# Patient Record
Sex: MALE | Race: WHITE | NOT HISPANIC OR LATINO | Employment: OTHER | URBAN - METROPOLITAN AREA
[De-identification: names, ages, dates, MRNs, and addresses within clinical notes are randomized per-mention and may not be internally consistent; named-entity substitution may affect disease eponyms.]

---

## 2020-10-25 ENCOUNTER — APPOINTMENT (OUTPATIENT)
Dept: PREADMISSION TESTING | Facility: HOSPITAL | Age: 80
End: 2020-10-25

## 2020-10-25 LAB — SARS-COV-2 RNA RESP QL NAA+PROBE: NOT DETECTED

## 2020-10-25 PROCEDURE — C9803 HOPD COVID-19 SPEC COLLECT: HCPCS

## 2020-10-25 PROCEDURE — U0004 COV-19 TEST NON-CDC HGH THRU: HCPCS

## 2020-12-20 ENCOUNTER — APPOINTMENT (OUTPATIENT)
Dept: PREADMISSION TESTING | Facility: HOSPITAL | Age: 80
End: 2020-12-20

## 2020-12-20 LAB — SARS-COV-2 RNA RESP QL NAA+PROBE: NOT DETECTED

## 2020-12-20 PROCEDURE — C9803 HOPD COVID-19 SPEC COLLECT: HCPCS

## 2020-12-20 PROCEDURE — U0004 COV-19 TEST NON-CDC HGH THRU: HCPCS

## 2022-02-11 ENCOUNTER — TRANSCRIBE ORDERS (OUTPATIENT)
Dept: CARDIOLOGY | Facility: CLINIC | Age: 82
End: 2022-02-11

## 2022-02-11 DIAGNOSIS — I63.22 OCCLUSION AND STENOSIS OF BASILAR ARTERY WITH CEREBRAL INFARCTION: Primary | ICD-10-CM

## 2022-04-18 ENCOUNTER — TELEPHONE (OUTPATIENT)
Dept: FAMILY MEDICINE CLINIC | Facility: CLINIC | Age: 82
End: 2022-04-18

## 2022-04-18 RX ORDER — DONEPEZIL HYDROCHLORIDE 10 MG/1
10 TABLET, FILM COATED ORAL NIGHTLY
Qty: 90 TABLET | Refills: 1 | Status: SHIPPED | OUTPATIENT
Start: 2022-04-18 | End: 2022-04-18

## 2022-04-18 RX ORDER — DONEPEZIL HYDROCHLORIDE 10 MG/1
TABLET, FILM COATED ORAL
COMMUNITY
Start: 2022-01-10 | End: 2022-04-18 | Stop reason: SDUPTHER

## 2022-04-18 RX ORDER — DONEPEZIL HYDROCHLORIDE 10 MG/1
TABLET, FILM COATED ORAL
Status: CANCELLED | OUTPATIENT
Start: 2022-04-18

## 2022-04-18 RX ORDER — DONEPEZIL HYDROCHLORIDE 10 MG/1
TABLET, FILM COATED ORAL
Qty: 90 TABLET | Refills: 3 | Status: SHIPPED | OUTPATIENT
Start: 2022-04-18 | End: 2022-08-23 | Stop reason: SDUPTHER

## 2022-04-18 NOTE — TELEPHONE ENCOUNTER
Incoming Refill Request      Medication requested (name and dose): DONETEZIL 10 MG    Pharmacy where request should be sent: [facility]    Additional details provided by patient: PT IS OUT OF THIS MEDICATION    Best call back number: [phone]    Does the patient have less than a 3 day supply:  [x] Yes  [] No    René Pearce Rep  04/18/22, 11:57 EDT

## 2022-05-27 RX ORDER — CALCIUM CITRATE/VITAMIN D3 200MG-6.25
TABLET ORAL
Qty: 100 EACH | Refills: 1 | Status: SHIPPED | OUTPATIENT
Start: 2022-05-27 | End: 2022-12-12

## 2022-07-11 ENCOUNTER — TELEPHONE (OUTPATIENT)
Dept: FAMILY MEDICINE CLINIC | Facility: CLINIC | Age: 82
End: 2022-07-11

## 2022-07-11 NOTE — TELEPHONE ENCOUNTER
Please let patient know that I got results of a CTA on him.  It showed that his right carotid artery has a 70% blockage.  I see that he has an appointment with his cardiologist in 3 days, so he needs to be sure to keep that appointment.  He will need referral to a vascular surgeon or a neurosurgeon, and I am sure the cardiologist will discuss this with him.  Usually when the degree of blockage is 70% or more, surgery is recommended to help prevent stroke.

## 2022-07-14 ENCOUNTER — OFFICE VISIT (OUTPATIENT)
Dept: CARDIOLOGY | Facility: CLINIC | Age: 82
End: 2022-07-14

## 2022-07-14 ENCOUNTER — TELEPHONE (OUTPATIENT)
Dept: CARDIOLOGY | Facility: CLINIC | Age: 82
End: 2022-07-14

## 2022-07-14 VITALS
TEMPERATURE: 97 F | RESPIRATION RATE: 14 BRPM | DIASTOLIC BLOOD PRESSURE: 64 MMHG | SYSTOLIC BLOOD PRESSURE: 110 MMHG | BODY MASS INDEX: 27.35 KG/M2 | OXYGEN SATURATION: 97 % | HEIGHT: 70 IN | HEART RATE: 68 BPM | WEIGHT: 191 LBS

## 2022-07-14 DIAGNOSIS — Z01.810 ENCOUNTER FOR PREPROCEDURAL CARDIOVASCULAR EXAMINATION: ICD-10-CM

## 2022-07-14 DIAGNOSIS — E78.5 HYPERLIPIDEMIA LDL GOAL <70: ICD-10-CM

## 2022-07-14 DIAGNOSIS — I65.21 CAROTID STENOSIS, ASYMPTOMATIC, RIGHT: Primary | ICD-10-CM

## 2022-07-14 DIAGNOSIS — E11.9 TYPE 2 DIABETES MELLITUS WITHOUT COMPLICATION, WITHOUT LONG-TERM CURRENT USE OF INSULIN: ICD-10-CM

## 2022-07-14 DIAGNOSIS — Z01.818 PREOPERATIVE EVALUATION OF A MEDICAL CONDITION TO RULE OUT SURGICAL CONTRAINDICATIONS (TAR REQUIRED): ICD-10-CM

## 2022-07-14 DIAGNOSIS — I10 ESSENTIAL HYPERTENSION: ICD-10-CM

## 2022-07-14 PROCEDURE — 99204 OFFICE O/P NEW MOD 45 MIN: CPT | Performed by: INTERNAL MEDICINE

## 2022-07-14 PROCEDURE — 93000 ELECTROCARDIOGRAM COMPLETE: CPT | Performed by: INTERNAL MEDICINE

## 2022-07-14 RX ORDER — CLOPIDOGREL BISULFATE 75 MG/1
75 TABLET ORAL DAILY
COMMUNITY
Start: 2022-06-07 | End: 2022-08-23 | Stop reason: SDUPTHER

## 2022-07-14 RX ORDER — ATORVASTATIN CALCIUM 10 MG/1
10 TABLET, FILM COATED ORAL DAILY
COMMUNITY
Start: 2022-02-03 | End: 2022-08-23 | Stop reason: SDUPTHER

## 2022-07-14 RX ORDER — LISINOPRIL 5 MG/1
5 TABLET ORAL DAILY
COMMUNITY
Start: 2021-12-07 | End: 2022-08-23 | Stop reason: SDUPTHER

## 2022-07-14 RX ORDER — CITALOPRAM 40 MG/1
40 TABLET ORAL DAILY
COMMUNITY
End: 2022-08-23 | Stop reason: SDUPTHER

## 2022-07-14 RX ORDER — METFORMIN HYDROCHLORIDE 500 MG/1
500 TABLET, EXTENDED RELEASE ORAL DAILY
COMMUNITY
Start: 2021-12-20 | End: 2022-07-23 | Stop reason: SDUPTHER

## 2022-07-14 NOTE — PROGRESS NOTES
[facility]  [facility]IOLOG[facility]  [address] [place] [address]  Dept: [phone]  Dept Fax: [phone]    Orestes Garcia Jr.  1940    Follow Up Office Visit Note    History of Present Illness:  Orestes Garcia Jr. is a 82 y.o. male who presents to the clinic for carotid stenosis- The patient is a 82 years old, with Hypertension, hyperlipidemia, no smoker, seems he has mild cognitive issues, although his wife seems he is fine , went to [facility] for neck sharp pain and was found 70% OLVIN by CTA, denies any weakness, speech abnormalities, . He has had a carotid doppler in 2019 with 40% and seems he has been taking ASA and Plavix likely for that, no history of CAD, no complaints, , his cardiac exam is normal BP is 125.,80 he takes Lisinopril 5mg and also Lipitor 40 mg, , he might not be a good candidate for surgery,  Maybe stent, will get an echo and also a stress nuclear, , will sent him to see cardiovascular surgeons    The following portions of the patient's history were reviewed and updated as appropriate: allergies, current medications, past family history, past medical history, past social history, past surgical history and problem list.    Medications:  atorvastatin  citalopram  clopidogrel  donepezil  lisinopril  metFORMIN ER  True Metrix Blood Glucose Test strip    Subjective  No Known Allergies     History reviewed. No pertinent past medical history.    History reviewed. No pertinent surgical history.    History reviewed. No pertinent family history.     Social History     Socioeconomic History   • Marital status:    Tobacco Use   • Smoking status: Never Smoker   • Smokeless tobacco: Never Used   Vaping Use   • Vaping Use: Never used   Substance and Sexual Activity   • Alcohol use: Never   • Drug use: Never   • Sexual activity: Defer       Review of Systems   Constitutional: Negative.    HENT: Negative.    Respiratory: Negative.    Cardiovascular: Negative.   "  Endocrine: Negative.    Genitourinary: Negative.    Musculoskeletal: Negative.    Skin: Negative.    Allergic/Immunologic: Negative.    Neurological: Negative.    Hematological: Negative.    Psychiatric/Behavioral: Negative.        Cardiovascular Procedures    ECHO/MUGA:   STRESS TESTS:   CARDIAC CATH:   DEVICES:   HOLTER:   CT/MRI:   VASCULAR:   CARDIOTHORACIC:     Objective  Vitals:    07/14/22 1417   BP: 110/64   BP Location: Left arm   Patient Position: Lying   Cuff Size: Adult   Pulse: 68   Resp: 14   Temp: 97 °F (36.1 °C)   TempSrc: Infrared   SpO2: 97%   Weight: 86.6 kg (191 lb)   Height: 177.8 cm (70\")   PainSc: 0-No pain     Body mass index is 27.41 kg/m².     Physical Exam  Constitutional:       Appearance: Healthy appearance. Not in distress.   Neck:      Vascular: No JVR. JVD normal.   Pulmonary:      Effort: Pulmonary effort is normal.      Breath sounds: Normal breath sounds. No wheezing. No rhonchi. No rales.   Chest:      Chest wall: Not tender to palpatation.   Cardiovascular:      PMI at left midclavicular line. Normal rate. Regular rhythm. Normal S1. Normal S2.      Murmurs: There is no murmur.      No gallop. No click. No rub.   Pulses:     Intact distal pulses.   Edema:     Peripheral edema absent.   Abdominal:      General: Bowel sounds are normal.      Palpations: Abdomen is soft.      Tenderness: There is no abdominal tenderness.   Musculoskeletal: Normal range of motion.         General: No tenderness. Skin:     General: Skin is warm and dry.   Neurological:      General: No focal deficit present.      Mental Status: Alert and oriented to person, place and time.          Diagnostic Data    ECG 12 Lead    Date/Time: 7/14/2022 3:18 PM  Performed by: Greg Wilkerson MD  Authorized by: Greg Wilkerson MD   Comparison: compared with previous ECG   Rhythm: sinus rhythm  Rate: normal  BPM: 64  QRS axis: normal  Other findings: non-specific ST-T wave changes    Clinical impression: " abnormal EKG            Assessment and Plan  Diagnoses and all orders for this visit:    Carotid stenosis, asymptomatic, right- no sure what to think about those complaints, 70%, on ASA, Plavix, . Will sent her to see ariadne Hargrove stress nuclear and also an echo in anticipation of the possible surgery or stent    Essential hypertension- The BP is 125.80  On Lisinopril 5mg    Hyperlipidemia LDL goal <70- On Lipitor 40 mg    Type 2 diabetes mellitus without complication, without long-term current use of insulin ([facility])    Other orders  -     citalopram (CeleXA) 40 MG tablet; Take 40 mg by mouth Daily.  -     atorvastatin (LIPITOR) 10 MG tablet; Take 10 mg by mouth Daily.  -     clopidogrel (PLAVIX) 75 MG tablet; Take 75 mg by mouth Daily.  -     lisinopril (PRINIVIL,ZESTRIL) 5 MG tablet; Take 5 mg by mouth Daily.  -     metFORMIN ER (GLUCOPHAGE-XR) 500 MG 24 hr tablet; Take 500 mg by mouth Daily.         No follow-ups on file.    Greg Wilkerson MD  07/14/2022

## 2022-07-14 NOTE — TELEPHONE ENCOUNTER
Left message for pt wife to call back . Before he see surgeron pt  will get an echo and also a stress nuclear, , will sent him to see cardiovascular surgeons.

## 2022-07-21 ENCOUNTER — TELEPHONE (OUTPATIENT)
Dept: CARDIOLOGY | Facility: CLINIC | Age: 82
End: 2022-07-21

## 2022-07-21 NOTE — TELEPHONE ENCOUNTER
----- Message from Greg Wilkerson MD sent at 7/20/2022  6:02 PM EDT -----  For some reason he does not have follow up, next week nothing urgent

## 2022-07-22 ENCOUNTER — PATIENT ROUNDING (BHMG ONLY) (OUTPATIENT)
Dept: CARDIOLOGY | Facility: CLINIC | Age: 82
End: 2022-07-22

## 2022-07-22 NOTE — PROGRESS NOTES
July 22, 2022    Hello, may I speak with Orestes Garcia Jr.?    My name is RIDGE      I am  with [facility]  [facility] CARDIOLOGY  [address] [place] [address].    Before we get started may I verify your date of birth? 1940    I am calling to officially welcome you to our practice and ask about your recent visit. Is this a good time to talk? yes    Tell me about your visit with us. What things went well?  VERY PLEASED WITH EVERYTHING       We're always looking for ways to make our patients' experiences even better. Do you have recommendations on ways we may improve?  no    Overall were you satisfied with your first visit to our practice? yes       I appreciate you taking the time to speak with me today. Is there anything else I can do for you? no      Thank you, and have a great day.

## 2022-07-23 ENCOUNTER — TELEPHONE (OUTPATIENT)
Dept: FAMILY MEDICINE CLINIC | Facility: CLINIC | Age: 82
End: 2022-07-23

## 2022-07-23 RX ORDER — METFORMIN HYDROCHLORIDE 500 MG/1
500 TABLET, EXTENDED RELEASE ORAL DAILY
Qty: 30 TABLET | Refills: 0 | Status: SHIPPED | OUTPATIENT
Start: 2022-07-23 | End: 2022-08-23 | Stop reason: SDUPTHER

## 2022-07-23 NOTE — TELEPHONE ENCOUNTER
Incoming Refill Request      Medication requested (name and dose):  METFORMIN 500 MG    Ph[facility] where request should be sent: [facility]    Additional details provided by patient: PT STATES HE HAS A COUPLE DAYS LEFT AND NEEDS REFILL SENT TO [facility]. HAS APPT SCHEDULE FOR 8/23/22    Best call back number: [phone]    Does the patient have less than a 3 day supply:  [x] Yes  [] No    René GREGORY Rep  07/23/22, 09:20 EDT

## 2022-07-27 ENCOUNTER — OFFICE VISIT (OUTPATIENT)
Dept: CARDIOLOGY | Facility: CLINIC | Age: 82
End: 2022-07-27

## 2022-07-27 VITALS
DIASTOLIC BLOOD PRESSURE: 58 MMHG | HEART RATE: 74 BPM | HEIGHT: 70 IN | OXYGEN SATURATION: 96 % | TEMPERATURE: 96.2 F | RESPIRATION RATE: 20 BRPM | SYSTOLIC BLOOD PRESSURE: 118 MMHG | BODY MASS INDEX: 27.66 KG/M2 | WEIGHT: 193.2 LBS

## 2022-07-27 DIAGNOSIS — E11.9 TYPE 2 DIABETES MELLITUS WITHOUT COMPLICATION, WITHOUT LONG-TERM CURRENT USE OF INSULIN: ICD-10-CM

## 2022-07-27 DIAGNOSIS — I65.21 CAROTID STENOSIS, ASYMPTOMATIC, RIGHT: Primary | ICD-10-CM

## 2022-07-27 DIAGNOSIS — I10 ESSENTIAL HYPERTENSION: ICD-10-CM

## 2022-07-27 DIAGNOSIS — E78.5 HYPERLIPIDEMIA LDL GOAL <70: ICD-10-CM

## 2022-07-27 PROCEDURE — 99214 OFFICE O/P EST MOD 30 MIN: CPT | Performed by: INTERNAL MEDICINE

## 2022-07-27 NOTE — PROGRESS NOTES
[facility]  [facility] CARDIOLOGY  [address] [place] [address]  Dept: [phone]  Dept Fax: [phone]    Orestes Garcia Jr.  1940    Follow Up Office Visit Note    History of Present Illness:  Orestes Garcia Jr. is a 82 y.o. male who presents to the clinic for Follow-up. Carotid stenosis - He has 70% OLVIN, stenosis he also has some vertebro basilar stenosis- Will refer him for the chances of intervention stent to Dr Stan Carrington , stress nuclear is normal, as well echo    The following portions of the patient's history were reviewed and updated as appropriate: allergies, current medications, past family history, past medical history, past social history, past surgical history and problem list.    Medications:  atorvastatin  citalopram  clopidogrel  donepezil  lisinopril  metFORMIN ER  regadenoson  True Metrix Blood Glucose Test strip    Subjective  No Known Allergies     History reviewed. No pertinent past medical history.    History reviewed. No pertinent surgical history.    History reviewed. No pertinent family history.     Social History     Socioeconomic History   • Marital status:    Tobacco Use   • Smoking status: Never Smoker   • Smokeless tobacco: Never Used   Vaping Use   • Vaping Use: Never used   Substance and Sexual Activity   • Alcohol use: Never   • Drug use: Never   • Sexual activity: Defer       Review of Systems   Constitutional: Negative.    HENT: Negative.    Respiratory: Negative.    Cardiovascular: Negative.    Endocrine: Negative.    Genitourinary: Negative.    Musculoskeletal: Negative.    Skin: Negative.    Allergic/Immunologic: Negative.    Neurological: Negative.    Hematological: Negative.    Psychiatric/Behavioral: Negative.        Cardiovascular Procedures    ECHO/MUGA:   STRESS TESTS:   CARDIAC CATH:   DEVICES:   HOLTER:   CT/MRI:   VASCULAR:   CARDIOTHORACIC:     Objective  Vitals:    07/27/22 1321   BP: 118/58   BP Location: Left  "arm   Patient Position: Lying   Cuff Size: Adult   Pulse: 74   Resp: 20   Temp: 96.2 °F (35.7 °C)   TempSrc: Temporal   SpO2: 96%   Weight: 87.6 kg (193 lb 3.2 oz)   Height: 177.8 cm (70\")   PainSc: 0-No pain     Body mass index is 27.72 kg/m².     Physical Exam  Constitutional:       Appearance: Healthy appearance. Not in distress.   Neck:      Vascular: No JVR. JVD normal.   Pulmonary:      Effort: Pulmonary effort is normal.      Breath sounds: Normal breath sounds. No wheezing. No rhonchi. No rales.   Chest:      Chest wall: Not tender to palpatation.   Cardiovascular:      PMI at left midclavicular line. Normal rate. Regular rhythm. Normal S1. Normal S2.      Murmurs: There is no murmur.      No gallop. No click. No rub.   Pulses:     Intact distal pulses.   Edema:     Peripheral edema absent.   Abdominal:      General: Bowel sounds are normal.      Palpations: Abdomen is soft.      Tenderness: There is no abdominal tenderness.   Musculoskeletal: Normal range of motion.         General: No tenderness. Skin:     General: Skin is warm and dry.   Neurological:      General: No focal deficit present.      Mental Status: Alert and oriented to person, place and time.          Diagnostic Data  Procedures    Assessment and Plan  Diagnoses and all orders for this visit:    Carotid stenosis, asymptomatic, right- Has 70% stenosis, will sent him for possible stent to Dr Charissa Pierce    Essential hypertension- BP is fine 105/60 just on Lisinopril 5mg    Hyperlipidemia LDL goal <70- On Atorvastatin 10 mg    Type 2 diabetes mellitus without complication, without long-term current use of insulin (HCC)         Return in about 3 months (around 10/27/2022) for Recheck.    Greg Wilkerson MD  07/27/2022  "

## 2022-08-22 ENCOUNTER — PREP FOR SURGERY (OUTPATIENT)
Dept: OTHER | Facility: HOSPITAL | Age: 82
End: 2022-08-22

## 2022-08-22 ENCOUNTER — OFFICE VISIT (OUTPATIENT)
Dept: NEUROSURGERY | Facility: CLINIC | Age: 82
End: 2022-08-22

## 2022-08-22 VITALS
WEIGHT: 192.2 LBS | HEART RATE: 70 BPM | OXYGEN SATURATION: 99 % | DIASTOLIC BLOOD PRESSURE: 72 MMHG | BODY MASS INDEX: 27.52 KG/M2 | HEIGHT: 70 IN | SYSTOLIC BLOOD PRESSURE: 140 MMHG | TEMPERATURE: 96.6 F

## 2022-08-22 DIAGNOSIS — I65.21 CAROTID STENOSIS, ASYMPTOMATIC, RIGHT: Primary | ICD-10-CM

## 2022-08-22 PROCEDURE — 99204 OFFICE O/P NEW MOD 45 MIN: CPT | Performed by: RADIOLOGY

## 2022-08-22 RX ORDER — SODIUM CHLORIDE 0.9 % (FLUSH) 0.9 %
10 SYRINGE (ML) INJECTION EVERY 12 HOURS SCHEDULED
Status: CANCELLED | OUTPATIENT
Start: 2022-08-22

## 2022-08-22 RX ORDER — SODIUM CHLORIDE 0.9 % (FLUSH) 0.9 %
1-10 SYRINGE (ML) INJECTION AS NEEDED
Status: CANCELLED | OUTPATIENT
Start: 2022-08-22

## 2022-08-22 RX ORDER — SODIUM CHLORIDE 9 MG/ML
100 INJECTION, SOLUTION INTRAVENOUS CONTINUOUS
Status: CANCELLED | OUTPATIENT
Start: 2022-08-22

## 2022-08-23 ENCOUNTER — OFFICE VISIT (OUTPATIENT)
Dept: FAMILY MEDICINE CLINIC | Facility: CLINIC | Age: 82
End: 2022-08-23

## 2022-08-23 VITALS
HEIGHT: 70 IN | SYSTOLIC BLOOD PRESSURE: 120 MMHG | OXYGEN SATURATION: 96 % | BODY MASS INDEX: 27.59 KG/M2 | DIASTOLIC BLOOD PRESSURE: 78 MMHG | TEMPERATURE: 97.8 F | RESPIRATION RATE: 18 BRPM | HEART RATE: 53 BPM | WEIGHT: 192.7 LBS

## 2022-08-23 DIAGNOSIS — Z79.899 HIGH RISK MEDICATION USE: ICD-10-CM

## 2022-08-23 DIAGNOSIS — R53.83 FATIGUE, UNSPECIFIED TYPE: ICD-10-CM

## 2022-08-23 DIAGNOSIS — K58.0 IRRITABLE BOWEL SYNDROME WITH DIARRHEA: ICD-10-CM

## 2022-08-23 DIAGNOSIS — I10 BENIGN ESSENTIAL HYPERTENSION: ICD-10-CM

## 2022-08-23 DIAGNOSIS — I65.21 CAROTID STENOSIS, ASYMPTOMATIC, RIGHT: ICD-10-CM

## 2022-08-23 DIAGNOSIS — E78.5 HYPERLIPIDEMIA LDL GOAL <70: ICD-10-CM

## 2022-08-23 DIAGNOSIS — K57.30 DIVERTICULAR DISEASE OF COLON: ICD-10-CM

## 2022-08-23 DIAGNOSIS — I65.21 STENOSIS OF RIGHT CAROTID ARTERY: ICD-10-CM

## 2022-08-23 DIAGNOSIS — F03.90 DEMENTIA WITHOUT BEHAVIORAL DISTURBANCE, UNSPECIFIED DEMENTIA TYPE: ICD-10-CM

## 2022-08-23 DIAGNOSIS — N18.31 TYPE 2 DIABETES MELLITUS WITH STAGE 3A CHRONIC KIDNEY DISEASE, WITHOUT LONG-TERM CURRENT USE OF INSULIN: Primary | ICD-10-CM

## 2022-08-23 DIAGNOSIS — E11.22 TYPE 2 DIABETES MELLITUS WITH STAGE 3A CHRONIC KIDNEY DISEASE, WITHOUT LONG-TERM CURRENT USE OF INSULIN: Primary | ICD-10-CM

## 2022-08-23 DIAGNOSIS — F32.4 MAJOR DEPRESSIVE DISORDER WITH SINGLE EPISODE, IN PARTIAL REMISSION: ICD-10-CM

## 2022-08-23 DIAGNOSIS — E78.2 MIXED HYPERLIPIDEMIA: ICD-10-CM

## 2022-08-23 PROBLEM — E11.9 TYPE 2 DIABETES MELLITUS: Status: ACTIVE | Noted: 2018-04-18

## 2022-08-23 PROCEDURE — 99214 OFFICE O/P EST MOD 30 MIN: CPT | Performed by: FAMILY MEDICINE

## 2022-08-23 PROCEDURE — 36415 COLL VENOUS BLD VENIPUNCTURE: CPT | Performed by: FAMILY MEDICINE

## 2022-08-23 RX ORDER — DONEPEZIL HYDROCHLORIDE 10 MG/1
10 TABLET, FILM COATED ORAL EVERY EVENING
Qty: 90 TABLET | Refills: 3 | Status: SHIPPED | OUTPATIENT
Start: 2022-08-23

## 2022-08-23 RX ORDER — LISINOPRIL 5 MG/1
5 TABLET ORAL DAILY
Qty: 90 TABLET | Refills: 3 | Status: SHIPPED | OUTPATIENT
Start: 2022-08-23

## 2022-08-23 RX ORDER — ATORVASTATIN CALCIUM 10 MG/1
10 TABLET, FILM COATED ORAL DAILY
Qty: 90 TABLET | Refills: 3 | Status: SHIPPED | OUTPATIENT
Start: 2022-08-23 | End: 2022-10-26 | Stop reason: SDUPTHER

## 2022-08-23 RX ORDER — CLOPIDOGREL BISULFATE 75 MG/1
75 TABLET ORAL DAILY
Qty: 90 TABLET | Refills: 3 | Status: SHIPPED | OUTPATIENT
Start: 2022-08-23

## 2022-08-23 RX ORDER — METFORMIN HYDROCHLORIDE 500 MG/1
500 TABLET, EXTENDED RELEASE ORAL DAILY
Qty: 90 TABLET | Refills: 3 | Status: SHIPPED | OUTPATIENT
Start: 2022-08-23 | End: 2023-08-04

## 2022-08-23 RX ORDER — CITALOPRAM 40 MG/1
40 TABLET ORAL DAILY
Qty: 90 TABLET | Refills: 3 | Status: SHIPPED | OUTPATIENT
Start: 2022-08-23

## 2022-08-24 LAB
ALBUMIN SERPL-MCNC: 4.6 G/DL (ref 3.6–4.6)
ALBUMIN/GLOB SERPL: 1.5 {RATIO} (ref 1.2–2.2)
ALP SERPL-CCNC: 84 IU/L (ref 44–121)
ALT SERPL-CCNC: 26 IU/L (ref 0–44)
AST SERPL-CCNC: 24 IU/L (ref 0–40)
BASOPHILS # BLD AUTO: 0.1 X10E3/UL (ref 0–0.2)
BASOPHILS NFR BLD AUTO: 1 %
BILIRUB SERPL-MCNC: 0.5 MG/DL (ref 0–1.2)
BUN SERPL-MCNC: 28 MG/DL (ref 8–27)
BUN/CREAT SERPL: 19 (ref 10–24)
CALCIUM SERPL-MCNC: 9.7 MG/DL (ref 8.6–10.2)
CHLORIDE SERPL-SCNC: 105 MMOL/L (ref 96–106)
CHOLEST SERPL-MCNC: 150 MG/DL (ref 100–199)
CO2 SERPL-SCNC: 23 MMOL/L (ref 20–29)
CREAT SERPL-MCNC: 1.46 MG/DL (ref 0.76–1.27)
EGFRCR-CYS SERPLBLD CKD-EPI 2021: 47 ML/MIN/1.73
EOSINOPHIL # BLD AUTO: 0.2 X10E3/UL (ref 0–0.4)
EOSINOPHIL NFR BLD AUTO: 3 %
ERYTHROCYTE [DISTWIDTH] IN BLOOD BY AUTOMATED COUNT: 13 % (ref 11.6–15.4)
GLOBULIN SER CALC-MCNC: 3 G/DL (ref 1.5–4.5)
GLUCOSE SERPL-MCNC: 130 MG/DL (ref 65–99)
HBA1C MFR BLD: 7 % (ref 4.8–5.6)
HCT VFR BLD AUTO: 45.4 % (ref 37.5–51)
HDLC SERPL-MCNC: 48 MG/DL
HGB BLD-MCNC: 15 G/DL (ref 13–17.7)
IMM GRANULOCYTES # BLD AUTO: 0 X10E3/UL (ref 0–0.1)
IMM GRANULOCYTES NFR BLD AUTO: 0 %
LDLC SERPL CALC-MCNC: 87 MG/DL (ref 0–99)
LYMPHOCYTES # BLD AUTO: 1.1 X10E3/UL (ref 0.7–3.1)
LYMPHOCYTES NFR BLD AUTO: 18 %
MCH RBC QN AUTO: 29.7 PG (ref 26.6–33)
MCHC RBC AUTO-ENTMCNC: 33 G/DL (ref 31.5–35.7)
MCV RBC AUTO: 90 FL (ref 79–97)
MONOCYTES # BLD AUTO: 0.7 X10E3/UL (ref 0.1–0.9)
MONOCYTES NFR BLD AUTO: 10 %
NEUTROPHILS # BLD AUTO: 4.3 X10E3/UL (ref 1.4–7)
NEUTROPHILS NFR BLD AUTO: 68 %
PLATELET # BLD AUTO: 187 X10E3/UL (ref 150–450)
POTASSIUM SERPL-SCNC: 4.4 MMOL/L (ref 3.5–5.2)
PROT SERPL-MCNC: 7.6 G/DL (ref 6–8.5)
RBC # BLD AUTO: 5.05 X10E6/UL (ref 4.14–5.8)
SODIUM SERPL-SCNC: 143 MMOL/L (ref 134–144)
TRIGL SERPL-MCNC: 80 MG/DL (ref 0–149)
TSH SERPL DL<=0.005 MIU/L-ACNC: 3.88 UIU/ML (ref 0.45–4.5)
VLDLC SERPL CALC-MCNC: 15 MG/DL (ref 5–40)
WBC # BLD AUTO: 6.4 X10E3/UL (ref 3.4–10.8)

## 2022-08-25 NOTE — PROGRESS NOTES
"Chief Complaint  Med Refill    Subjective      Orestes Garcia . presents to [facility]  History of Present Illness  Patient is here for 6-month checkup and medicine refills.  He really does not want to be here today and thinks it is a waste of his time because he has been seeing other doctors, but those of the doctors including cardiologist and a vascular surgeon or neurosurgeon because he was found to have a 70% right internal carotid artery obstruction recently.  He is going for an arteriogram soon.  I explained to the patient that since he has diabetes and a right of other health problems that the cardiologist and neurosurgeon will not address, he needs to be seen here every 6 months and have lab work, and more often if any problems develop.  He states his blood sugars been 125-140.  He has a history of dementia, but insisted on coming back to the room alone therefore I do not know if all of his history is reliable nor do I know if you will remember the instructions I gave him.  However, he denies any other issues or concerns today    Objective   Vital Signs:   Vitals:    08/23/22 0919   BP: 120/78   BP Location: Left arm   Patient Position: Sitting   Cuff Size: Adult   Pulse: 53   Resp: 18   Temp: 97.8 °F (36.6 °C)   TempSrc: Temporal   SpO2: 96%   Weight: 87.4 kg (192 lb 11.2 oz)   Height: 177.8 cm (70\")      /78 (BP Location: Left arm, Patient Position: Sitting, Cuff Size: Adult)   Pulse 53   Temp 97.8 °F (36.6 °C) (Temporal)   Resp 18   Ht 177.8 cm (70\")   Wt 87.4 kg (192 lb 11.2 oz)   SpO2 96%   BMI 27.65 kg/m²     Body mass index is 27.65 kg/m².    Review of Systems   Constitutional: Negative for chills, fever and unexpected weight loss.   HENT: Negative for ear discharge, ear pain, mouth sores, nosebleeds, rhinorrhea, sinus pressure, sore throat, swollen glands and trouble swallowing.    Eyes: Negative for blurred vision, double vision, pain, redness and visual " disturbance.   Respiratory: Negative for cough, shortness of breath and wheezing.    Cardiovascular: Negative for chest pain, palpitations and leg swelling.        PND, orthopnea   Gastrointestinal: Negative for abdominal distention, abdominal pain, blood in stool, constipation, diarrhea, nausea, vomiting, GERD and indigestion.        Dysphagia, odynophagia   Endocrine: Negative for polydipsia, polyphagia and polyuria.   Genitourinary: Positive for nocturia. Negative for dysuria, hematuria and urinary incontinence.   Musculoskeletal: Negative for arthralgias (unusual/atypica), back pain, gait problem, joint swelling, myalgias and neck pain.   Skin: Negative for rash, skin lesions (worrisome/suspicious) and bruise.   Allergic/Immunologic: Negative for food allergies.   Neurological: Positive for memory problem. Negative for dizziness, tremors, seizures, syncope, weakness, light-headedness, numbness and headache.   Hematological: Negative for adenopathy. Does not bruise/bleed easily.   Psychiatric/Behavioral: Negative for suicidal ideas and depressed mood. The patient is not nervous/anxious.        Past History:  Medical History: has a past medical history of Anemia, Benign essential HTN, Carotid artery stenosis (07/2020), CKD (chronic kidney disease), stage III (HCC), Colon polyp (2012), Condition not found (2018), Dementia (HCC), Depression, Diverticular disease of colon, Diverticulosis, Hearing loss, High risk medication use, IBS (irritable bowel syndrome), Mixed hyperlipidemia, Overweight (BMI 25.0-29.9), Skin cancer, Transient cerebral ischemia (2019), and Type 2 diabetes mellitus (HCC).   Surgical History: has a past surgical history that includes Skin biopsy (Right, 2016); Colonoscopy (2012); Tonsillectomy (1950); and Cataract extraction (2020).   Family History: family history includes Alzheimer's disease in his father; Cancer in his mother; Coronary artery disease (age of onset: 86) in his father; Diabetes in  his father; Hearing loss in his father; Hypertension in his mother; Pancreatic cancer in his mother.   Social History: reports that he has never smoked. He has never used smokeless tobacco. He reports that he does not drink alcohol and does not use drugs.      Current Outpatient Medications:   •  atorvastatin (LIPITOR) 10 MG tablet, Take 1 tablet by mouth Daily., Disp: 90 tablet, Rfl: 3  •  Cholecalciferol (Vitamin D3) 25 MCG (1000 UT) capsule, Take 1,000 Units by mouth Daily., Disp: , Rfl:   •  citalopram (CeleXA) 40 MG tablet, Take 1 tablet by mouth Daily., Disp: 90 tablet, Rfl: 3  •  clopidogrel (PLAVIX) 75 MG tablet, Take 1 tablet by mouth Daily., Disp: 90 tablet, Rfl: 3  •  donepezil (ARICEPT) 10 MG tablet, Take 1 tablet by mouth Every Evening., Disp: 90 tablet, Rfl: 3  •  lisinopril (PRINIVIL,ZESTRIL) 5 MG tablet, Take 1 tablet by mouth Daily., Disp: 90 tablet, Rfl: 3  •  metFORMIN ER (GLUCOPHAGE-XR) 500 MG 24 hr tablet, Take 1 tablet by mouth Daily for 346 days., Disp: 90 tablet, Rfl: 3  •  True Metrix Blood Glucose Test test strip, USE ONE STRIP TWO TIMES A DAY OR AS NEEDED FOR DIABETES, Disp: 100 each, Rfl: 1    Current Facility-Administered Medications:   •  regadenoson (LEXISCAN) injection 0.4 mg, 0.4 mg, Intravenous, Once in imaging, Greg Wilkerson MD    Allergies: Patient has no known allergies.    Physical Exam  Constitutional:       General: He is not in acute distress.     Appearance: He is not toxic-appearing.   HENT:      Head: Normocephalic and atraumatic.      Right Ear: Ear canal and external ear normal.      Left Ear: Ear canal and external ear normal.      Nose: Nose normal.      Mouth/Throat:      Mouth: Mucous membranes are moist.      Pharynx: Oropharynx is clear.   Eyes:      General: No scleral icterus.     Extraocular Movements: Extraocular movements intact.      Conjunctiva/sclera: Conjunctivae normal.      Pupils: Pupils are equal, round, and reactive to light.   Neck:       Vascular: No carotid bruit.   Cardiovascular:      Rate and Rhythm: Normal rate and regular rhythm.      Pulses: Normal pulses.      Heart sounds: Normal heart sounds.   Pulmonary:      Effort: Pulmonary effort is normal.      Breath sounds: Normal breath sounds.   Chest:      Chest wall: No tenderness.   Abdominal:      General: Bowel sounds are normal. There is no distension.      Palpations: Abdomen is soft. There is no mass.      Tenderness: There is no abdominal tenderness. There is no guarding or rebound.   Musculoskeletal:         General: No swelling, tenderness or deformity. Normal range of motion.      Cervical back: Normal range of motion. No rigidity.      Right lower leg: No edema.      Left lower leg: No edema.   Lymphadenopathy:      Cervical: No cervical adenopathy.   Skin:     General: Skin is warm and dry.      Capillary Refill: Capillary refill takes less than 2 seconds.      Coloration: Skin is not pale.      Findings: No erythema or rash.   Neurological:      General: No focal deficit present.      Mental Status: He is alert and oriented to person, place, and time.      Cranial Nerves: No cranial nerve deficit.      Motor: No weakness.      Coordination: Coordination normal.      Gait: Gait normal.   Psychiatric:         Attention and Perception: Attention and perception normal.         Mood and Affect: Mood normal. Affect is angry.         Speech: Speech normal.         Behavior: Behavior normal.         Thought Content: Thought content normal. Thought content is not paranoid or delusional. Thought content does not include homicidal or suicidal ideation.         Cognition and Memory: Cognition normal. He exhibits impaired remote memory.         Judgment: Judgment normal.          Result Review :                  Assessment and Plan   Diagnoses and all orders for this visit:    1. Type 2 diabetes mellitus with stage 3a chronic kidney disease, without long-term current use of insulin (HCC)  (Primary)  -     Hemoglobin A1c; Future  -     Hemoglobin A1c  Patient initially refused to do blood work today but I was able to persuade him to go ahead and have the things done that we need to the day.  His diabetes appears to be under good control with current medications.  I will refill his medications and check labs and plan on seeing him back in 6 months or sooner if needed  2. Benign essential hypertension    3. Mixed hyperlipidemia  -     Lipid Panel; Future  -     Lipid Panel    4. Stenosis of right carotid artery    5. Hyperlipidemia LDL goal <70    6. Carotid stenosis, asymptomatic, right    7. Irritable bowel syndrome with diarrhea    8. High risk medication use  -     CBC Auto Differential; Future  -     Comprehensive Metabolic Panel; Future  -     CBC Auto Differential  -     Comprehensive Metabolic Panel    9. Diverticular disease of colon    10. Fatigue, unspecified type  -     TSH; Future  -     TSH    11. Dementia without behavioral disturbance, unspecified dementia type (HCC)  Patient is on Aricept 10 mg daily, and refused to let any family members come back with him today  12. Major depressive disorder with single episode, in partial remission (HCC)  Patient reports that his depression and anxiety symptoms are well controlled with current medication  Other orders  -     atorvastatin (LIPITOR) 10 MG tablet; Take 1 tablet by mouth Daily.  Dispense: 90 tablet; Refill: 3  -     citalopram (CeleXA) 40 MG tablet; Take 1 tablet by mouth Daily.  Dispense: 90 tablet; Refill: 3  -     donepezil (ARICEPT) 10 MG tablet; Take 1 tablet by mouth Every Evening.  Dispense: 90 tablet; Refill: 3  -     clopidogrel (PLAVIX) 75 MG tablet; Take 1 tablet by mouth Daily.  Dispense: 90 tablet; Refill: 3  -     lisinopril (PRINIVIL,ZESTRIL) 5 MG tablet; Take 1 tablet by mouth Daily.  Dispense: 90 tablet; Refill: 3  -     metFORMIN ER (GLUCOPHAGE-XR) 500 MG 24 hr tablet; Take 1 tablet by mouth Daily for 346 days.   Dispense: 90 tablet; Refill: 3                 Follow Up   Return in about 6 months (around 2/23/2023) for Recheck, Nurse - AWV Subsequent.  Patient was given instructions and counseling regarding his condition or for health maintenance advice. Please see specific information pulled into the AVS if appropriate.     Jose Alberto Vang MD

## 2022-08-31 ENCOUNTER — HOSPITAL ENCOUNTER (OUTPATIENT)
Facility: HOSPITAL | Age: 82
Setting detail: HOSPITAL OUTPATIENT SURGERY
Discharge: HOME | End: 2022-08-31
Attending: RADIOLOGY | Admitting: RADIOLOGY

## 2022-08-31 VITALS
DIASTOLIC BLOOD PRESSURE: 95 MMHG | WEIGHT: 193 LBS | SYSTOLIC BLOOD PRESSURE: 131 MMHG | RESPIRATION RATE: 18 BRPM | BODY MASS INDEX: 27.63 KG/M2 | TEMPERATURE: 97.9 F | HEART RATE: 60 BPM | OXYGEN SATURATION: 93 % | HEIGHT: 70 IN

## 2022-08-31 DIAGNOSIS — I65.21 CAROTID STENOSIS, ASYMPTOMATIC, RIGHT: ICD-10-CM

## 2022-08-31 PROCEDURE — 36223 PLACE CATH CAROTID/INOM ART: CPT | Performed by: RADIOLOGY

## 2022-08-31 PROCEDURE — C1769 GUIDE WIRE: HCPCS | Performed by: RADIOLOGY

## 2022-08-31 PROCEDURE — 36225 PLACE CATH SUBCLAVIAN ART: CPT | Performed by: RADIOLOGY

## 2022-08-31 PROCEDURE — 76937 US GUIDE VASCULAR ACCESS: CPT | Performed by: RADIOLOGY

## 2022-08-31 PROCEDURE — 0 IODIXANOL PER 1 ML: Performed by: RADIOLOGY

## 2022-08-31 RX ORDER — LIDOCAINE HYDROCHLORIDE 10 MG/ML
INJECTION, SOLUTION EPIDURAL; INFILTRATION; INTRACAUDAL; PERINEURAL AS NEEDED
Status: DISCONTINUED | OUTPATIENT
Start: 2022-08-31 | End: 2022-08-31 | Stop reason: HOSPADM

## 2022-08-31 RX ORDER — IODIXANOL 320 MG/ML
INJECTION, SOLUTION INTRAVASCULAR AS NEEDED
Status: DISCONTINUED | OUTPATIENT
Start: 2022-08-31 | End: 2022-08-31 | Stop reason: HOSPADM

## 2022-08-31 RX ORDER — SODIUM CHLORIDE 0.9 % (FLUSH) 0.9 %
10 SYRINGE (ML) INJECTION EVERY 12 HOURS SCHEDULED
Status: DISCONTINUED | OUTPATIENT
Start: 2022-08-31 | End: 2022-08-31 | Stop reason: HOSPADM

## 2022-08-31 RX ORDER — SODIUM CHLORIDE 9 MG/ML
75 INJECTION, SOLUTION INTRAVENOUS CONTINUOUS
Status: ACTIVE | OUTPATIENT
Start: 2022-08-31 | End: 2022-08-31

## 2022-08-31 RX ORDER — SODIUM CHLORIDE 9 MG/ML
100 INJECTION, SOLUTION INTRAVENOUS CONTINUOUS
Status: DISCONTINUED | OUTPATIENT
Start: 2022-08-31 | End: 2022-08-31 | Stop reason: HOSPADM

## 2022-08-31 RX ORDER — SODIUM CHLORIDE 0.9 % (FLUSH) 0.9 %
1-10 SYRINGE (ML) INJECTION AS NEEDED
Status: DISCONTINUED | OUTPATIENT
Start: 2022-08-31 | End: 2022-08-31 | Stop reason: HOSPADM

## 2022-08-31 RX ORDER — SODIUM CHLORIDE 0.9 % (FLUSH) 0.9 %
10 SYRINGE (ML) INJECTION AS NEEDED
Status: DISCONTINUED | OUTPATIENT
Start: 2022-08-31 | End: 2022-08-31 | Stop reason: HOSPADM

## 2022-08-31 RX ADMIN — SODIUM CHLORIDE 100 ML/HR: 9 INJECTION, SOLUTION INTRAVENOUS at 09:44

## 2022-09-12 ENCOUNTER — TELEPHONE (OUTPATIENT)
Dept: FAMILY MEDICINE CLINIC | Facility: CLINIC | Age: 82
End: 2022-09-12

## 2022-09-12 NOTE — TELEPHONE ENCOUNTER
Incoming Refill Request      Medication requested (name and dose): CLOPIDOGREL 75 MG    [facility] where [facility] be sent[facility]     Additional details provided by patient: PT IS OUT OF THIS MEDICATION    Best call back number: [phone]    Does the patient have less than a 3 day supply:  [x] Yes  [] No    René Pearce Rep  09/12/22, 12:34 EDT

## 2022-10-24 ENCOUNTER — TELEPHONE (OUTPATIENT)
Dept: FAMILY MEDICINE CLINIC | Facility: CLINIC | Age: 82
End: 2022-10-24

## 2022-10-24 NOTE — TELEPHONE ENCOUNTER
Incoming Refill Request      Medication requested (name and dose): donepexil 10 mg    [facility] where [facility] be sent[facility]    Additional details provided by patient: pt is almost of out his medication    Best call back number: [phone]    Does the patient have less than a 3 day supply:  [x] Yes  [] No    René Pearce Rep  10/24/22, 13:31 EDT

## 2022-10-26 ENCOUNTER — OFFICE VISIT (OUTPATIENT)
Dept: CARDIOLOGY | Facility: CLINIC | Age: 82
End: 2022-10-26

## 2022-10-26 VITALS
WEIGHT: 193 LBS | SYSTOLIC BLOOD PRESSURE: 116 MMHG | HEIGHT: 70 IN | DIASTOLIC BLOOD PRESSURE: 60 MMHG | TEMPERATURE: 96.8 F | OXYGEN SATURATION: 94 % | HEART RATE: 63 BPM | RESPIRATION RATE: 18 BRPM | BODY MASS INDEX: 27.63 KG/M2

## 2022-10-26 DIAGNOSIS — E11.22 TYPE 2 DIABETES MELLITUS WITH STAGE 3A CHRONIC KIDNEY DISEASE, WITHOUT LONG-TERM CURRENT USE OF INSULIN: ICD-10-CM

## 2022-10-26 DIAGNOSIS — I65.23 CAROTID ARTERY STENOSIS, ASYMPTOMATIC, BILATERAL: Primary | ICD-10-CM

## 2022-10-26 DIAGNOSIS — I10 BENIGN ESSENTIAL HYPERTENSION: ICD-10-CM

## 2022-10-26 DIAGNOSIS — N18.31 TYPE 2 DIABETES MELLITUS WITH STAGE 3A CHRONIC KIDNEY DISEASE, WITHOUT LONG-TERM CURRENT USE OF INSULIN: ICD-10-CM

## 2022-10-26 DIAGNOSIS — E78.5 HYPERLIPIDEMIA LDL GOAL <70: ICD-10-CM

## 2022-10-26 PROBLEM — I65.21 STENOSIS OF RIGHT CAROTID ARTERY: Status: RESOLVED | Noted: 2022-08-23 | Resolved: 2022-10-26

## 2022-10-26 PROBLEM — E11.9 TYPE 2 DIABETES MELLITUS, WITHOUT LONG-TERM CURRENT USE OF INSULIN: Status: RESOLVED | Noted: 2022-07-14 | Resolved: 2022-10-26

## 2022-10-26 PROCEDURE — 99214 OFFICE O/P EST MOD 30 MIN: CPT

## 2022-10-26 RX ORDER — ASPIRIN 81 MG/1
81 TABLET ORAL DAILY
COMMUNITY

## 2022-10-26 RX ORDER — ATORVASTATIN CALCIUM 20 MG/1
20 TABLET, FILM COATED ORAL NIGHTLY
Qty: 90 TABLET | Refills: 2 | Status: SHIPPED | OUTPATIENT
Start: 2022-10-26

## 2022-10-26 NOTE — PROGRESS NOTES
[facility] [place]  [facility] CARDIOLOGY  [address] [place] [address]  Dept: [phone]  Dept Fax: [phone]    Orestes Garcia Jr.  1940    Follow Up Office Visit Note    History of Present Illness:  Orestes Garcia Jr. is a 82 y.o. male who presents to the clinic for Follow-up. CLARENCE, s/p carotid angiogram Dr. Falk, mild-mod plaque bilateral  < 50% stenosis, also advance intracranial disease with noted collateral flow, on DAPT, also Lipitor 20 mg qd, asymptomatic at this time. He denies all complaints today, no CP,SOB, dizziness, fatigue, LE edema, palpitations, orthopnea, PND. Vitals are stable, PE reveals 1+ nonpitting edema bilateral. At this time will increase Lipitor to 40 mg q hs, LDL 87. Will follow 6 months or sooner as indicated.     The following portions of the patient's history were reviewed and updated as appropriate: allergies, current medications, past family history, past medical history, past social history, past surgical history, and problem list.    Medications:  aspirin  atorvastatin  citalopram  clopidogrel  donepezil  lisinopril  metFORMIN ER  True Metrix Blood Glucose Test strip  Vitamin D3 capsule    Subjective  No Known Allergies     Past Medical History:   Diagnosis Date   • Anemia    • Benign essential HTN    • Carotid artery stenosis 07/2020    RIGHT CAROTID  40-60% STENOSIS ON DOPPLER RECORD SAYS H/O TIA BUT PT DENIES THIS??   • CKD (chronic kidney disease), stage III (HCC)    • Colon polyp 2012    LAST C QUESTIONABLE   • Condition not found 2018    COLOGUARD NEG 9-18   • Dementia (HCC)    • Depression    • Diverticular disease of colon     WITHOUT HEMORRHAGE   • Diverticulosis     WITH LOWR GI BLEED APPROS AGE 49   • Hearing loss    • High risk medication use    • IBS (irritable bowel syndrome)     WITH DIARRHEA   • Mixed hyperlipidemia    • Overweight (BMI 25.0-29.9)    • Skin cancer     LEG   • Transient cerebral ischemia 2019    POSSIBLE A/W FALL  "  • Type 2 diabetes mellitus (HCC)     WITH STAGE 3 CKD WITHOUT LONG TERM CURRENT USE OF INSULIN   WITH POTEINURIA       Past Surgical History:   Procedure Laterality Date   • CATARACT EXTRACTION  2020   • COLONOSCOPY  2012    [facility]   • INTERVENTIONAL RADIOLOGY PROCEDURE Bilateral 8/31/2022    Procedure: Carotid Cerebral Angiogram;  Surgeon: Stan Falk MD;  Location: [facility] INVASIVE LOCATION;  Service: Interventional Radiology;  Laterality: Bilateral;   • SKIN BIOPSY Right 2016    LEG   • TONSILLECTOMY  1950       Family History   Problem Relation Age of Onset   • Cancer Mother    • Hypertension Mother    • Pancreatic cancer Mother    • Alzheimer's disease Father    • Coronary artery disease Father 86   • Diabetes Father    • Hearing loss Father         Social History     Socioeconomic History   • Marital status:    Tobacco Use   • Smoking status: Never   • Smokeless tobacco: Never   Vaping Use   • Vaping Use: Never used   Substance and Sexual Activity   • Alcohol use: Never   • Drug use: Never   • Sexual activity: Defer       Review of Systems   All other systems reviewed and are negative.      Cardiovascular Procedures    ECHO/MUGA:   STRESS TESTS:   CARDIAC CATH:   DEVICES:   HOLTER:   CT/MRI:   VASCULAR:   CARDIOTHORACIC:     Objective  Vitals:    10/26/22 1358   BP: 116/60   BP Location: Left arm   Patient Position: Sitting   Cuff Size: Adult   Pulse: 63   Resp: 18   Temp: 96.8 °F (36 °C)   TempSrc: Infrared   SpO2: 94%   Weight: 87.5 kg (193 lb)   Height: 177.8 cm (70\")   PainSc: 0-No pain     Body mass index is 27.69 kg/m².     Physical Exam  Constitutional:       Appearance: Healthy appearance. Not in distress.   Neck:      Vascular: No JVR. JVD normal.   Pulmonary:      Effort: Pulmonary effort is normal.      Breath sounds: Normal breath sounds. No wheezing. No rhonchi. No rales.   Chest:      Chest wall: Not tender to palpatation.   Cardiovascular:      PMI at left midclavicular line. " Normal rate. Regular rhythm. Normal S1. Normal S2.      Murmurs: There is no murmur.      No gallop. No click. No rub.   Pulses:     Carotid: with bruit bilaterally.  Edema:     Pretibial: bilateral 1+ edema of the pretibial area.     Ankle: bilateral 1+ edema of the ankle.  Abdominal:      General: Bowel sounds are normal.      Palpations: Abdomen is soft.      Tenderness: There is no abdominal tenderness.   Musculoskeletal: Normal range of motion.         General: No tenderness. Skin:     General: Skin is warm and dry.   Neurological:      General: No focal deficit present.      Mental Status: Alert and oriented to person, place and time.          Diagnostic Data  Procedures    Assessment and Plan  Diagnoses and all orders for this visit:    1. Carotid artery stenosis, asymptomatic, bilateral (Primary)  S/P carotid angiogram revealing mild-moderate bilateral plaque < 50%, also advanced intracranial disease with noted collateral flow, asymptomatic, on DAPT, Lipitor 20 mg qd. Will continue to watch, he will follow Dr. Falk in Feb 2023.   -     atorvastatin (LIPITOR) 20 MG tablet; Take 1 tablet by mouth Every Night.  Dispense: 90 tablet; Refill: 2    2. Benign essential hypertension  On Lisinopril 5 mg qd, BP today 110/65. Continue current therapy.    3. Hyperlipidemia LDL goal <70  On Lipitor 10 mg qd, LDL 87, Trig 80. Will increase to 20 mg q hs.   -     atorvastatin (LIPITOR) 20 MG tablet; Take 1 tablet by mouth Every Night.  Dispense: 90 tablet; Refill: 2    4. Type 2 diabetes mellitus with stage 3a chronic kidney disease, without long-term current use of insulin ([facility])  A1C 7. Manged by PCP.        Return in about 6 months (around 4/26/2023) for Recheck.    Marilynn Norton, APRN  10/26/2022

## 2022-12-06 ENCOUNTER — TELEPHONE (OUTPATIENT)
Dept: FAMILY MEDICINE CLINIC | Facility: CLINIC | Age: 82
End: 2022-12-06

## 2022-12-06 DIAGNOSIS — N18.31 TYPE 2 DIABETES MELLITUS WITH STAGE 3A CHRONIC KIDNEY DISEASE, WITHOUT LONG-TERM CURRENT USE OF INSULIN: Primary | ICD-10-CM

## 2022-12-06 DIAGNOSIS — E11.22 TYPE 2 DIABETES MELLITUS WITH STAGE 3A CHRONIC KIDNEY DISEASE, WITHOUT LONG-TERM CURRENT USE OF INSULIN: Primary | ICD-10-CM

## 2022-12-06 NOTE — TELEPHONE ENCOUNTER
Incoming Refill Request      Medication requested (name and dose): one touch test strips    Pharmacy where request should be sent: [facility]    Additional details provided by patient: pt has a few left, wants refill    Best call back number: [phone]    Does the patient have less than a 3 day supply:  [x] Yes  [] No    René Pearce Rep  12/06/22, 14:07 EST

## 2022-12-12 DIAGNOSIS — E11.22 TYPE 2 DIABETES MELLITUS WITH STAGE 3A CHRONIC KIDNEY DISEASE, WITHOUT LONG-TERM CURRENT USE OF INSULIN: ICD-10-CM

## 2022-12-12 DIAGNOSIS — N18.31 TYPE 2 DIABETES MELLITUS WITH STAGE 3A CHRONIC KIDNEY DISEASE, WITHOUT LONG-TERM CURRENT USE OF INSULIN: ICD-10-CM

## 2022-12-12 NOTE — TELEPHONE ENCOUNTER
PATIENT STATES HE WANTED THIS RX TO GO TO [facility].  [facility] WAS SENT TO [facility].      [facility]

## 2022-12-13 RX ORDER — CALCIUM CITRATE/VITAMIN D3 200MG-6.25
TABLET ORAL
OUTPATIENT
Start: 2022-12-13

## 2023-01-16 RX ORDER — LISINOPRIL 5 MG/1
TABLET ORAL
Qty: 90 TABLET | Refills: 2 | OUTPATIENT
Start: 2023-01-16

## 2023-02-07 ENCOUNTER — OFFICE VISIT (OUTPATIENT)
Dept: FAMILY MEDICINE CLINIC | Facility: CLINIC | Age: 83
End: 2023-02-07
Payer: MEDICARE

## 2023-02-07 VITALS
DIASTOLIC BLOOD PRESSURE: 80 MMHG | OXYGEN SATURATION: 97 % | HEIGHT: 70 IN | RESPIRATION RATE: 18 BRPM | HEART RATE: 63 BPM | BODY MASS INDEX: 27.99 KG/M2 | WEIGHT: 195.5 LBS | SYSTOLIC BLOOD PRESSURE: 120 MMHG

## 2023-02-07 DIAGNOSIS — E11.22 TYPE 2 DIABETES MELLITUS WITH STAGE 3A CHRONIC KIDNEY DISEASE, WITHOUT LONG-TERM CURRENT USE OF INSULIN: ICD-10-CM

## 2023-02-07 DIAGNOSIS — E78.5 HYPERLIPIDEMIA LDL GOAL <70: ICD-10-CM

## 2023-02-07 DIAGNOSIS — K58.0 IRRITABLE BOWEL SYNDROME WITH DIARRHEA: ICD-10-CM

## 2023-02-07 DIAGNOSIS — I65.23 CAROTID ARTERY STENOSIS, ASYMPTOMATIC, BILATERAL: ICD-10-CM

## 2023-02-07 DIAGNOSIS — Z79.899 HIGH RISK MEDICATION USE: ICD-10-CM

## 2023-02-07 DIAGNOSIS — E11.649 TYPE 2 DIABETES MELLITUS WITH HYPOGLYCEMIA WITHOUT COMA, WITHOUT LONG-TERM CURRENT USE OF INSULIN: ICD-10-CM

## 2023-02-07 DIAGNOSIS — Z79.899 ENCOUNTER FOR LONG-TERM (CURRENT) USE OF OTHER MEDICATIONS: ICD-10-CM

## 2023-02-07 DIAGNOSIS — N18.31 TYPE 2 DIABETES MELLITUS WITH STAGE 3A CHRONIC KIDNEY DISEASE, WITHOUT LONG-TERM CURRENT USE OF INSULIN: ICD-10-CM

## 2023-02-07 DIAGNOSIS — I10 BENIGN ESSENTIAL HYPERTENSION: ICD-10-CM

## 2023-02-07 DIAGNOSIS — F03.90 DEMENTIA WITHOUT BEHAVIORAL DISTURBANCE: ICD-10-CM

## 2023-02-07 DIAGNOSIS — F32.4 MAJOR DEPRESSIVE DISORDER WITH SINGLE EPISODE, IN PARTIAL REMISSION: ICD-10-CM

## 2023-02-07 DIAGNOSIS — Z00.01 ENCOUNTER FOR GENERAL ADULT MEDICAL EXAMINATION WITH ABNORMAL FINDINGS: Primary | ICD-10-CM

## 2023-02-07 DIAGNOSIS — R53.83 OTHER FATIGUE: ICD-10-CM

## 2023-02-07 LAB
POC CREATININE URINE: 17.7
POC MICROALBUMIN URINE: 80

## 2023-02-07 PROCEDURE — 82044 UR ALBUMIN SEMIQUANTITATIVE: CPT | Performed by: FAMILY MEDICINE

## 2023-02-07 PROCEDURE — 36415 COLL VENOUS BLD VENIPUNCTURE: CPT | Performed by: FAMILY MEDICINE

## 2023-02-07 PROCEDURE — G0009 ADMIN PNEUMOCOCCAL VACCINE: HCPCS | Performed by: FAMILY MEDICINE

## 2023-02-07 PROCEDURE — 90677 PCV20 VACCINE IM: CPT | Performed by: FAMILY MEDICINE

## 2023-02-07 PROCEDURE — 99397 PER PM REEVAL EST PAT 65+ YR: CPT | Performed by: FAMILY MEDICINE

## 2023-02-07 NOTE — PROGRESS NOTES
"Chief Complaint  Annual Exam    Subjective      Orestes Garcia Jr. presents to [facility] PRIMARY [facility]ARE  History of Present Illness  Patient is here for annual exam.  He says all his medical problems are stable.  His glucose has been averaging 150 in the morning, and he has not had any significant hypo or hyperglycemic symptoms lately.  He just had an eye exam 1 to 2 months ago and brought a copy of the record.  Objective   Vital Signs:   Vitals:    02/07/23 0843   BP: 120/80   Pulse: 63   Resp: 18   SpO2: 97%   Weight: 88.7 kg (195 lb 8 oz)   Height: 177.8 cm (70\")      /80   Pulse 63   Resp 18   Ht 177.8 cm (70\")   Wt 88.7 kg (195 lb 8 oz)   SpO2 97%   BMI 28.05 kg/m²     Body mass index is 28.05 kg/m².    Review of Systems   Constitutional: Negative for activity change, appetite change, chills, fever and unexpected weight loss.   HENT: Positive for hearing loss. Negative for ear discharge, ear pain, mouth sores, nosebleeds, rhinorrhea, sinus pressure, sore throat, swollen glands, trouble swallowing and voice change.    Eyes: Negative for blurred vision, double vision, pain, redness and visual disturbance.   Respiratory: Negative for cough, choking, chest tightness, shortness of breath and wheezing.    Cardiovascular: Negative for chest pain, palpitations and leg swelling.        PND, orthopnea   Gastrointestinal: Negative for abdominal distention, abdominal pain, blood in stool, constipation, diarrhea, nausea, vomiting and GERD.        Dysphagia, odynophagia   Endocrine: Negative for polydipsia, polyphagia and polyuria.   Genitourinary: Positive for nocturia. Negative for dysuria, frequency, hematuria and urinary incontinence.   Musculoskeletal: Negative for arthralgias (unusual/atypica), back pain, gait problem, joint swelling, myalgias and neck pain.   Skin: Negative for rash, skin lesions (worrisome/suspicious), wound and bruise.   Allergic/Immunologic: Negative for food allergies. "   Neurological: Positive for memory problem. Negative for dizziness, tremors, seizures, syncope, weakness, light-headedness, numbness and headache.   Hematological: Negative for adenopathy. Does not bruise/bleed easily.   Psychiatric/Behavioral: Negative for sleep disturbance, suicidal ideas and depressed mood. The patient is not nervous/anxious.        Past History:  Medical History: has a past medical history of Anemia, Benign essential HTN, Carotid artery stenosis (07/2020), CKD (chronic kidney disease), stage III (HCC), Colon polyp (2012), Condition not found (2018), Dementia (HCC), Depression, Diverticular disease of colon, Diverticulosis, Hearing loss, High risk medication use, IBS (irritable bowel syndrome), Mixed hyperlipidemia, Overweight (BMI 25.0-29.9), Skin cancer, Transient cerebral ischemia (2019), and Type 2 diabetes mellitus (HCC).   Surgical History: has a past surgical history that includes Skin biopsy (Right, 2016); Colonoscopy (2012); Tonsillectomy (1950); Cataract extraction (2020); and Interventional radiology procedure (Bilateral, 8/31/2022).   Family History: family history includes Alzheimer's disease in his father; Cancer in his mother; Coronary artery disease (age of onset: 86) in his father; Diabetes in his father; Hearing loss in his father; Hypertension in his mother; Pancreatic cancer in his mother.   Social History: reports that he has never smoked. He has never used smokeless tobacco. He reports that he does not drink alcohol and does not use drugs.      Current Outpatient Medications:   •  aspirin 81 MG EC tablet, Take 1 tablet by mouth Daily., Disp: , Rfl:   •  atorvastatin (LIPITOR) 20 MG tablet, Take 1 tablet by mouth Every Night., Disp: 90 tablet, Rfl: 2  •  Cholecalciferol (Vitamin D3) 25 MCG (1000 UT) capsule, Take 1,000 Units by mouth Daily., Disp: , Rfl:   •  citalopram (CeleXA) 40 MG tablet, Take 1 tablet by mouth Daily., Disp: 90 tablet, Rfl: 3  •  clopidogrel (PLAVIX) 75  MG tablet, Take 1 tablet by mouth Daily., Disp: 90 tablet, Rfl: 3  •  donepezil (ARICEPT) 10 MG tablet, Take 1 tablet by mouth Every Evening., Disp: 90 tablet, Rfl: 3  •  glucose blood test strip, 1 each by Other route As Needed (to test blood glucose for DM e11.65). Check fsbs qd, Disp: 100 each, Rfl: 3  •  lisinopril (PRINIVIL,ZESTRIL) 5 MG tablet, Take 1 tablet by mouth Daily., Disp: 90 tablet, Rfl: 3  •  metFORMIN ER (GLUCOPHAGE-XR) 500 MG 24 hr tablet, Take 1 tablet by mouth Daily for 346 days., Disp: 90 tablet, Rfl: 3    Allergies: Patient has no known allergies.    Physical Exam  Constitutional:       General: He is not in acute distress.     Appearance: He is not toxic-appearing.   HENT:      Head: Normocephalic and atraumatic.      Right Ear: Ear canal and external ear normal.      Left Ear: Ear canal and external ear normal.      Nose: Nose normal.      Mouth/Throat:      Mouth: Mucous membranes are moist.      Pharynx: Oropharynx is clear.   Eyes:      General: No scleral icterus.     Extraocular Movements: Extraocular movements intact.      Conjunctiva/sclera: Conjunctivae normal.      Pupils: Pupils are equal, round, and reactive to light.   Neck:      Vascular: No carotid bruit.   Cardiovascular:      Rate and Rhythm: Normal rate and regular rhythm.      Pulses: Normal pulses.      Heart sounds: Normal heart sounds.   Pulmonary:      Effort: Pulmonary effort is normal.      Breath sounds: Normal breath sounds.   Chest:      Chest wall: No tenderness.   Abdominal:      General: Bowel sounds are normal. There is no distension.      Palpations: Abdomen is soft.      Tenderness: There is no abdominal tenderness. There is no guarding or rebound.   Musculoskeletal:         General: No swelling, tenderness or deformity. Normal range of motion.      Cervical back: Normal range of motion. No rigidity.      Right lower leg: No edema.      Left lower leg: No edema.   Lymphadenopathy:      Cervical: No cervical  adenopathy.   Skin:     General: Skin is warm and dry.      Capillary Refill: Capillary refill takes less than 2 seconds.      Coloration: Skin is not pale.      Findings: No erythema or rash.   Neurological:      General: No focal deficit present.      Mental Status: He is alert. Mental status is at baseline.      Cranial Nerves: No cranial nerve deficit, dysarthria or facial asymmetry.      Motor: No weakness, tremor or atrophy.      Coordination: Coordination normal.      Gait: Gait normal.   Psychiatric:         Attention and Perception: Attention and perception normal.         Mood and Affect: Mood and affect normal.         Speech: Speech normal.         Behavior: Behavior normal.         Thought Content: Thought content normal. Thought content is not paranoid or delusional. Thought content does not include homicidal or suicidal ideation.         Cognition and Memory: Cognition normal. Memory is impaired.         Judgment: Judgment normal.                   Assessment and Plan   Diagnoses and all orders for this visit:    1. Encounter for general adult medical examination with abnormal findings (Primary)  Healthy lifestyle measures including appropriate diet and regular exercise were encouraged, along with precautions about weight.  He admits he has not been exercising over the winter and needs to start doing so.  Preventive health measures were also discussed and he will be given a Prevnar 20 today.  Otherwise he is up-to-date  2. Type 2 diabetes mellitus with hypoglycemia without coma, without long-term current use of insulin ([facility])  -     POC Microalbumin  -     Hemoglobin A1c; Future  Diabetes is well controlled with metformin alone at this time, we will check labs and refill when the prescription comes due.  Says he has not had any hypoglycemia since I last saw him  3. Type 2 diabetes mellitus with stage 3a chronic kidney disease, without long-term current use of insulin (HCC)  As above, the diabetes is  well controlled and the kidney function has been stable with current medications  4. Benign essential hypertension    5. Irritable bowel syndrome with diarrhea    6. High risk medication use  -     CBC & Differential; Future  -     Comprehensive Metabolic Panel; Future    7. Dementia without behavioral disturbance (HCC)  Condition is chronic and the patient is taking Aricept 10 mg and tolerating this well with no significant behavioral disturbances  8. Major depressive disorder with single episode, in partial remission (HCC)  Patient says his depression is well controlled and he is tolerating his current dose of Celexa well.  He understands the dose is higher than recommended but lower doses have been ineffective and he is willing to accept the risks in order to stay on his current dose.  The benefits outweigh the risk in his situation  9. Hyperlipidemia LDL goal <70  -     Lipid Panel; Future    10. Carotid artery stenosis, asymptomatic, bilateral    11. Encounter for long-term (current) use of other medications    12. Other fatigue  -     TSH+Free T4; Future    Other orders  -     Pneumococcal Conjugate Vaccine 20-Valent (PCV20)  I will plan on seeing him back in 6 months or sooner if needed          Follow Up   Return in about 6 months (around 8/7/2023) for Annual physical.  Patient was given instructions and counseling regarding his condition or for health maintenance advice. Please see specific information pulled into the AVS if appropriate.     Jose Alberto Vang MD

## 2023-02-08 LAB
ALBUMIN SERPL-MCNC: 4.6 G/DL (ref 3.6–4.6)
ALBUMIN/GLOB SERPL: 1.8 {RATIO} (ref 1.2–2.2)
ALP SERPL-CCNC: 85 IU/L (ref 44–121)
ALT SERPL-CCNC: 36 IU/L (ref 0–44)
AST SERPL-CCNC: 35 IU/L (ref 0–40)
BASOPHILS # BLD AUTO: 0.1 X10E3/UL (ref 0–0.2)
BASOPHILS NFR BLD AUTO: 1 %
BILIRUB SERPL-MCNC: 0.6 MG/DL (ref 0–1.2)
BUN SERPL-MCNC: 25 MG/DL (ref 8–27)
BUN/CREAT SERPL: 15 (ref 10–24)
CALCIUM SERPL-MCNC: 9.9 MG/DL (ref 8.6–10.2)
CHLORIDE SERPL-SCNC: 107 MMOL/L (ref 96–106)
CHOLEST SERPL-MCNC: 110 MG/DL (ref 100–199)
CO2 SERPL-SCNC: 22 MMOL/L (ref 20–29)
CREAT SERPL-MCNC: 1.67 MG/DL (ref 0.76–1.27)
EGFRCR SERPLBLD CKD-EPI 2021: 40 ML/MIN/1.73
EOSINOPHIL # BLD AUTO: 0.2 X10E3/UL (ref 0–0.4)
EOSINOPHIL NFR BLD AUTO: 3 %
ERYTHROCYTE [DISTWIDTH] IN BLOOD BY AUTOMATED COUNT: 12.8 % (ref 11.6–15.4)
GLOBULIN SER CALC-MCNC: 2.6 G/DL (ref 1.5–4.5)
GLUCOSE SERPL-MCNC: 139 MG/DL (ref 70–99)
HBA1C MFR BLD: 7.3 % (ref 4.8–5.6)
HCT VFR BLD AUTO: 46.8 % (ref 37.5–51)
HDLC SERPL-MCNC: 39 MG/DL
HGB BLD-MCNC: 15.5 G/DL (ref 13–17.7)
IMM GRANULOCYTES # BLD AUTO: 0 X10E3/UL (ref 0–0.1)
IMM GRANULOCYTES NFR BLD AUTO: 0 %
LDLC SERPL CALC-MCNC: 54 MG/DL (ref 0–99)
LYMPHOCYTES # BLD AUTO: 1.2 X10E3/UL (ref 0.7–3.1)
LYMPHOCYTES NFR BLD AUTO: 17 %
MCH RBC QN AUTO: 29.8 PG (ref 26.6–33)
MCHC RBC AUTO-ENTMCNC: 33.1 G/DL (ref 31.5–35.7)
MCV RBC AUTO: 90 FL (ref 79–97)
MONOCYTES # BLD AUTO: 0.7 X10E3/UL (ref 0.1–0.9)
MONOCYTES NFR BLD AUTO: 11 %
NEUTROPHILS # BLD AUTO: 4.7 X10E3/UL (ref 1.4–7)
NEUTROPHILS NFR BLD AUTO: 68 %
PLATELET # BLD AUTO: 177 X10E3/UL (ref 150–450)
POTASSIUM SERPL-SCNC: 5.1 MMOL/L (ref 3.5–5.2)
PROT SERPL-MCNC: 7.2 G/DL (ref 6–8.5)
RBC # BLD AUTO: 5.21 X10E6/UL (ref 4.14–5.8)
SODIUM SERPL-SCNC: 145 MMOL/L (ref 134–144)
T4 FREE SERPL-MCNC: 1.09 NG/DL (ref 0.82–1.77)
TRIGL SERPL-MCNC: 86 MG/DL (ref 0–149)
TSH SERPL DL<=0.005 MIU/L-ACNC: 5.88 UIU/ML (ref 0.45–4.5)
VLDLC SERPL CALC-MCNC: 17 MG/DL (ref 5–40)
WBC # BLD AUTO: 6.9 X10E3/UL (ref 3.4–10.8)

## 2023-04-26 ENCOUNTER — OFFICE VISIT (OUTPATIENT)
Dept: CARDIOLOGY | Facility: CLINIC | Age: 83
End: 2023-04-26
Payer: MEDICARE

## 2023-04-26 VITALS
DIASTOLIC BLOOD PRESSURE: 72 MMHG | OXYGEN SATURATION: 93 % | RESPIRATION RATE: 16 BRPM | TEMPERATURE: 98 F | HEIGHT: 70 IN | BODY MASS INDEX: 27.2 KG/M2 | HEART RATE: 84 BPM | WEIGHT: 190 LBS | SYSTOLIC BLOOD PRESSURE: 140 MMHG

## 2023-04-26 DIAGNOSIS — E11.649 TYPE 2 DIABETES MELLITUS WITH HYPOGLYCEMIA WITHOUT COMA, WITHOUT LONG-TERM CURRENT USE OF INSULIN: ICD-10-CM

## 2023-04-26 DIAGNOSIS — I10 BENIGN ESSENTIAL HYPERTENSION: ICD-10-CM

## 2023-04-26 DIAGNOSIS — E78.5 HYPERLIPIDEMIA LDL GOAL <70: ICD-10-CM

## 2023-04-26 DIAGNOSIS — I65.23 CAROTID ARTERY STENOSIS, ASYMPTOMATIC, BILATERAL: Primary | ICD-10-CM

## 2023-04-26 NOTE — PROGRESS NOTES
[facility] [place]  [facility] CARDIOLOGY  [address] [place] [address]  Dept: [phone]  Dept Fax: [phone]    Orestes Garcia Jr.  1940    Follow Up Office Visit Note    History of Present Illness:  Orestes Garcia Jr. is a 83 y.o. 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    Medications:  aspirin  atorvastatin  citalopram  clopidogrel  donepezil  glucose blood  lisinopril  metFORMIN ER  Vitamin D3 capsule    Subjective  No Known Allergies     Past Medical History:   Diagnosis Date   • Anemia    • Benign essential HTN    • Carotid artery stenosis 07/2020    RIGHT CAROTID  40-60% STENOSIS ON DOPPLER RECORD SAYS H/O TIA BUT PT DENIES THIS??   • CKD (chronic kidney disease), stage III    • Colon polyp 2012    LAST C QUESTIONABLE   • Condition not found 2018    COLOGUARD NEG 9-18   • Dementia    • Depression    • Diverticular disease of colon     WITHOUT HEMORRHAGE   • Diverticulosis     WITH LOWR GI BLEED APPROS AGE 49   • Hearing loss    • High risk medication use    • IBS (irritable bowel syndrome)     WITH DIARRHEA   • Mixed hyperlipidemia    • Overweight (BMI 25.0-29.9)    • Skin cancer     LEG   • Transient cerebral ischemia 2019    POSSIBLE A/W FALL   • Type 2 diabetes mellitus     WITH STAGE 3 CKD WITHOUT LONG TERM CURRENT USE OF INSULIN   WITH POTEINURIA       Past Surgical History:   Procedure Laterality Date   • CATARACT EXTRACTION  2020   • COLONOSCOPY  2012    [facility]   • INTERVENTIONAL  "RADIOLOGY PROCEDURE Bilateral 8/31/2022    Procedure: Carotid Cerebral Angiogram;  Surgeon: Stan Falk MD;  Location: [facility] INVASIVE LOCATION;  Service: Interventional Radiology;  Laterality: Bilateral;   • SKIN BIOPSY Right 2016    LEG   • TONSILLECTOMY  1950       Family History   Problem Relation Age of Onset   • Cancer Mother    • Hypertension Mother    • Pancreatic cancer Mother    • Alzheimer's disease Father    • Coronary artery disease Father 86   • Diabetes Father    • Hearing loss Father         Social History     Socioeconomic History   • Marital status:    Tobacco Use   • Smoking status: Never   • Smokeless tobacco: Never   Vaping Use   • Vaping Use: Never used   Substance and Sexual Activity   • Alcohol use: Never   • Drug use: Never   • Sexual activity: Defer       Review of Systems   Constitutional: Negative.    HENT: Negative.    Respiratory: Negative.    Cardiovascular: Negative.    Endocrine: Negative.    Genitourinary: Negative.    Musculoskeletal: Negative.    Skin: Negative.    Allergic/Immunologic: Negative.    Neurological: Negative.    Hematological: Negative.    Psychiatric/Behavioral: Negative.        Cardiovascular Procedures    ECHO/MUGA:  STRESS TESTS:   CARDIAC CATH:   DEVICES:   HOLTER:   CT/MRI:   VASCULAR:   CARDIOTHORACIC:     Objective  Vitals:    04/26/23 1410   BP: 140/72   BP Location: Right arm   Patient Position: Lying   Cuff Size: Adult   Pulse: 84   Resp: 16   Temp: 98 °F (36.7 °C)   TempSrc: Infrared   SpO2: 93%   Weight: 86.2 kg (190 lb)   Height: 177.8 cm (70\")   PainSc: 0-No pain     Body mass index is 27.26 kg/m².     Physical Exam  Vitals reviewed.   Constitutional:       Appearance: Healthy appearance. Not in distress.   Eyes:      Pupils: Pupils are equal, round, and reactive to light.   HENT:    Mouth/Throat:      Pharynx: Oropharynx is clear.   Neck:      Thyroid: Thyroid normal.      Vascular: No JVR. JVD normal.   Pulmonary:      Effort: " Pulmonary effort is normal.      Breath sounds: Normal breath sounds. No wheezing. No rhonchi. No rales.   Chest:      Chest wall: Not tender to palpatation.   Cardiovascular:      PMI at left midclavicular line. Normal rate. Regular rhythm. Normal S1. Normal S2.      Murmurs: There is no murmur.      No gallop. No click. No rub.   Pulses:     Intact distal pulses.   Edema:     Peripheral edema absent.   Abdominal:      General: Bowel sounds are normal.      Palpations: Abdomen is soft.      Tenderness: There is no abdominal tenderness.   Musculoskeletal: Normal range of motion.         General: No tenderness.      Cervical back: Normal range of motion and neck supple. Skin:     General: Skin is warm and dry.   Neurological:      General: No focal deficit present.      Mental Status: Alert and oriented to person, place and time.          Diagnostic Data  Procedures    Assessment and Plan  Diagnoses and all orders for this visit:    Carotid artery stenosis, asymptomatic, bilateral- 50% bilateral by angiogram last year on Plavix and asa, has also 1005 right vertebral stenosis with colaterals    Hyperlipidemia LDL goal <70- On Lipitor 20 mg, tolerates well LDl is good     Type 2 diabetes mellitus with hypoglycemia without coma, without long-term current use of insulin    Benign essential hypertension- BP is 120.70, on low dose Lisinopril 5 mg          Return in about 6 months (around 10/26/2023) for Recheck with Dr. Wilkerson.    Greg Wilkerson MD  04/26/2023

## 2023-05-16 ENCOUNTER — OFFICE VISIT (OUTPATIENT)
Dept: FAMILY MEDICINE CLINIC | Facility: CLINIC | Age: 83
End: 2023-05-16
Payer: MEDICARE

## 2023-05-16 VITALS
HEIGHT: 70 IN | SYSTOLIC BLOOD PRESSURE: 120 MMHG | OXYGEN SATURATION: 95 % | WEIGHT: 190 LBS | DIASTOLIC BLOOD PRESSURE: 60 MMHG | HEART RATE: 60 BPM | BODY MASS INDEX: 27.2 KG/M2

## 2023-05-16 DIAGNOSIS — M79.672 LEFT FOOT PAIN: Primary | ICD-10-CM

## 2023-05-16 PROCEDURE — 99213 OFFICE O/P EST LOW 20 MIN: CPT | Performed by: PHYSICIAN ASSISTANT

## 2023-05-16 PROCEDURE — 3074F SYST BP LT 130 MM HG: CPT | Performed by: PHYSICIAN ASSISTANT

## 2023-05-16 PROCEDURE — 3078F DIAST BP <80 MM HG: CPT | Performed by: PHYSICIAN ASSISTANT

## 2023-05-16 NOTE — PROGRESS NOTES
"Chief Complaint  Foot Pain (Left foot pain going on 3 weeks )    Subjective          Orestes Garcia JrAleisha presents to [facility]  History of Present Illness  Patient in today for evaluation on left foot pain for past 3+ weeks. States wore shoes that were not wide enough and started to have pain at bunion area on left foot and has persisted. Denies heat or swelling to area. No other known injury to area.   Foot Pain  This is a new problem. The current episode started 1 to 4 weeks ago. Associated symptoms include arthralgias. Pertinent negatives include no fever or joint swelling.       Objective   Vital Signs:   /60 (BP Location: Left arm, Patient Position: Sitting, Cuff Size: Adult)   Pulse 60   Ht 177.8 cm (70\")   Wt 86.2 kg (190 lb)   SpO2 95%   BMI 27.26 kg/m²     Body mass index is 27.26 kg/m².    Review of Systems   Constitutional: Negative for fever.   Musculoskeletal: Positive for arthralgias. Negative for joint swelling.       Past History:  Medical History: has a past medical history of Anemia, Benign essential HTN, Carotid artery stenosis (07/2020), CKD (chronic kidney disease), stage III, Colon polyp (2012), Condition not found (2018), Dementia, Depression, Diverticular disease of colon, Diverticulosis, Hearing loss, High risk medication use, IBS (irritable bowel syndrome), Mixed hyperlipidemia, Overweight (BMI 25.0-29.9), Skin cancer, Transient cerebral ischemia (2019), and Type 2 diabetes mellitus.   Surgical History: has a past surgical history that includes Skin biopsy (Right, 2016); Colonoscopy (2012); Tonsillectomy (1950); Cataract extraction (2020); and Interventional radiology procedure (Bilateral, 8/31/2022).   Family History: family history includes Alzheimer's disease in his father; Cancer in his mother; Coronary artery disease (age of onset: 86) in his father; Diabetes in his father; Hearing loss in his father; Hypertension in his mother; Pancreatic " cancer in his mother.   Social History: reports that he has never smoked. He has never used smokeless tobacco. He reports that he does not drink alcohol and does not use drugs.      Current Outpatient Medications:   •  aspirin 81 MG EC tablet, Take 1 tablet by mouth Daily., Disp: , Rfl:   •  atorvastatin (LIPITOR) 20 MG tablet, Take 1 tablet by mouth Every Night., Disp: 90 tablet, Rfl: 2  •  Cholecalciferol (Vitamin D3) 25 MCG (1000 UT) capsule, Take 1 capsule by mouth Daily., Disp: , Rfl:   •  citalopram (CeleXA) 40 MG tablet, Take 1 tablet by mouth Daily., Disp: 90 tablet, Rfl: 3  •  clopidogrel (PLAVIX) 75 MG tablet, Take 1 tablet by mouth Daily., Disp: 90 tablet, Rfl: 3  •  donepezil (ARICEPT) 10 MG tablet, Take 1 tablet by mouth Every Evening., Disp: 90 tablet, Rfl: 3  •  glucose blood test strip, 1 each by Other route As Needed (to test blood glucose for DM e11.65). Check fsbs qd, Disp: 100 each, Rfl: 3  •  lisinopril (PRINIVIL,ZESTRIL) 5 MG tablet, Take 1 tablet by mouth Daily., Disp: 90 tablet, Rfl: 3  •  metFORMIN ER (GLUCOPHAGE-XR) 500 MG 24 hr tablet, Take 1 tablet by mouth Daily for 346 days., Disp: 90 tablet, Rfl: 3  Allergies: Patient has no known allergies.    Physical Exam  Constitutional:       Appearance: Normal appearance.   Musculoskeletal:      Left foot: Normal range of motion. Bunion and tenderness present. No swelling.      Comments: Bunion noted to left foot- tender to palpate area;    Neurological:      Mental Status: He is alert and oriented to person, place, and time.   Psychiatric:         Mood and Affect: Mood normal.         Behavior: Behavior normal.             Assessment and Plan   Diagnoses and all orders for this visit:    1. Left foot pain (Primary)  -     XR Foot 3+ View Left (In Office)  -     Ambulatory Referral to Podiatry    Will check xray today and he would like to go ahead and get in with podiatry  with persistent symptoms; encouraged to wear supportive/wide shoes so as  not to aggravate area; if any redness, heat, acute swelling to rtc prior if needed       Follow Up   No follow-ups on file.  Patient was given instructions and counseling regarding his condition or for health maintenance advice. Please see specific information pulled into the AVS if appropriate.     Anika Pope PA-C

## 2023-05-17 ENCOUNTER — TELEPHONE (OUTPATIENT)
Dept: FAMILY MEDICINE CLINIC | Facility: CLINIC | Age: 83
End: 2023-05-17
Payer: MEDICARE

## 2023-05-17 NOTE — TELEPHONE ENCOUNTER
Caller: Orestes Garcia Jr.    Relationship: Self    Best call back number: [phone]    What is the best time to reach you: ANYTIME    Who are you requesting to speak with (clinical staff, provider,  specific staff member): CLINICAL - DR CARRERA    What was the call regarding: PATIENT SAW PEDIATRIST TODAY. HE ADVISED PATIENT TO ASK DR CARRERA FOR A MEDROL DOSE PACK FOR GOUT ON HIS LEFT FOOT. CAN YOU PLEASE SEND THAT IN TO [facility]. THEN ADVISE PATIENT WHEN IT'S SENT IN.  THANK YOU.    Do you require a callback: YES

## 2023-05-18 NOTE — TELEPHONE ENCOUNTER
Caller: [facility] - [place], [place] - [address] - [phone] [facility] [phone] FX    Relationship: Pharmacy    Best call back number: [phone]  Requested Prescriptions:   SOMETHING FOR GOUT     Pharmacy where request should be sent: [facility] - [place], [place] - [address] - [phone] [facility] [phone] FX     Last office visit with prescribing clinician: 2/7/2023   Last telemedicine visit with prescribing clinician: 5/16/2023   Next office visit with prescribing clinician: Visit date not found     Additional details provided by patient: PHARMACY CALLING TO SEE IF WE HAD ANYTHING ORDERED FOR GOUT. PATIENT CALLED YESTERDAY AND SAID, PATIENT SAW PEDIATRIST TODAY. HE ADVISED PATIENT TO ASK DR CARRERA FOR A MEDROL DOSE PACK FOR GOUT ON HIS LEFT FOOT. CAN YOU PLEASE SEND THAT IN TO [facility] PHARM[facility]Y. THEN ADVISE PATIENT WHEN IT'S SENT IN.  Does the patient have less than a 3 day supply:  [x] Yes  [] No    Would you like a call back once the refill request has been completed: [x] Yes [] No    If the office needs to give you a call back, can they leave a voicemail: [x] Yes [] No    René Matthew Rep   05/18/23 09:35 EDT

## 2023-05-18 NOTE — TELEPHONE ENCOUNTER
Called [facility] they are sending over the note and said they are sending message to doctor to send in the prescription.

## 2023-05-19 RX ORDER — PREDNISONE 20 MG/1
TABLET ORAL
Qty: 9 TABLET | Refills: 0 | Status: SHIPPED | OUTPATIENT
Start: 2023-05-19

## 2023-05-19 NOTE — TELEPHONE ENCOUNTER
Spoke to pt and advised med sent in and informed him of Bekah's instructions while taking the medication. Pt understood.

## 2023-05-19 NOTE — TELEPHONE ENCOUNTER
Please let him know I sent in steroid prescription; please have him take with food and monitor sugar levels while taking; please ask him if he routinely has any GI upset/reflux as could also send in medication for that while on steroid if needed; thanks

## 2023-05-19 NOTE — TELEPHONE ENCOUNTER
Talked to Nemo w/ [facility]. Dr. Viera was wanting pt to touch base and follow up w/ PCP to make sure he was ok to take med with other meds. I advised we would send in med. Please send to [facility] and call pt to advise.

## 2023-05-31 ENCOUNTER — OFFICE VISIT (OUTPATIENT)
Dept: FAMILY MEDICINE CLINIC | Facility: CLINIC | Age: 83
End: 2023-05-31

## 2023-05-31 VITALS
OXYGEN SATURATION: 95 % | WEIGHT: 194 LBS | BODY MASS INDEX: 27.77 KG/M2 | HEART RATE: 77 BPM | HEIGHT: 70 IN | DIASTOLIC BLOOD PRESSURE: 72 MMHG | SYSTOLIC BLOOD PRESSURE: 116 MMHG

## 2023-05-31 DIAGNOSIS — M79.672 LEFT FOOT PAIN: Primary | ICD-10-CM

## 2023-05-31 PROCEDURE — 99213 OFFICE O/P EST LOW 20 MIN: CPT | Performed by: PHYSICIAN ASSISTANT

## 2023-05-31 PROCEDURE — 3074F SYST BP LT 130 MM HG: CPT | Performed by: PHYSICIAN ASSISTANT

## 2023-05-31 PROCEDURE — 3078F DIAST BP <80 MM HG: CPT | Performed by: PHYSICIAN ASSISTANT

## 2023-05-31 NOTE — PROGRESS NOTES
"Chief Complaint  Follow-up (Follow up on foot pt states not having any issues anymore /)    Subjective          Orestes Garcia  presents to [facility]  History of Present Illness  Patient in today for f/up on left foot pain. He took prednisone and states pain went away and now only minimal discomfort if hits area of bunion.Was felt could have been gout flare, but uric acid came back at 6.1 .  He f/up with podiatry last week and was put in supportive shoe. He is not sure how long he is to wear and not sure if has f/up with podiatry. We have contacted the office but do not have records yet.       Objective   Vital Signs:   /72 (BP Location: Left arm, Patient Position: Sitting, Cuff Size: Adult)   Pulse 77   Ht 177.8 cm (70\")   Wt 88 kg (194 lb)   SpO2 95%   BMI 27.84 kg/m²     Body mass index is 27.84 kg/m².    Review of Systems   Constitutional: Negative for fever.   Cardiovascular: Negative for chest pain and leg swelling.   Gastrointestinal: Negative for abdominal pain, blood in stool, diarrhea, nausea and vomiting.   Musculoskeletal: Negative for arthralgias.       Past History:  Medical History: has a past medical history of Anemia, Benign essential HTN, Carotid artery stenosis (07/2020), CKD (chronic kidney disease), stage III, Colon polyp (2012), Condition not found (2018), Dementia, Depression, Diverticular disease of colon, Diverticulosis, Hearing loss, High risk medication use, IBS (irritable bowel syndrome), Mixed hyperlipidemia, Overweight (BMI 25.0-29.9), Skin cancer, Transient cerebral ischemia (2019), and Type 2 diabetes mellitus.   Surgical History: has a past surgical history that includes Skin biopsy (Right, 2016); Colonoscopy (2012); Tonsillectomy (1950); Cataract extraction (2020); and Interventional radiology procedure (Bilateral, 8/31/2022).   Family History: family history includes Alzheimer's disease in his father; Cancer in his mother; Coronary " artery disease (age of onset: 86) in his father; Diabetes in his father; Hearing loss in his father; Hypertension in his mother; Pancreatic cancer in his mother.   Social History: reports that he has never smoked. He has never used smokeless tobacco. He reports that he does not drink alcohol and does not use drugs.      Current Outpatient Medications:   •  aspirin 81 MG EC tablet, Take 1 tablet by mouth Daily., Disp: , Rfl:   •  atorvastatin (LIPITOR) 20 MG tablet, Take 1 tablet by mouth Every Night., Disp: 90 tablet, Rfl: 2  •  Cholecalciferol (Vitamin D3) 25 MCG (1000 UT) capsule, Take 1 capsule by mouth Daily., Disp: , Rfl:   •  citalopram (CeleXA) 40 MG tablet, Take 1 tablet by mouth Daily., Disp: 90 tablet, Rfl: 3  •  clopidogrel (PLAVIX) 75 MG tablet, Take 1 tablet by mouth Daily., Disp: 90 tablet, Rfl: 3  •  donepezil (ARICEPT) 10 MG tablet, Take 1 tablet by mouth Every Evening., Disp: 90 tablet, Rfl: 3  •  glucose blood test strip, 1 each by Other route As Needed (to test blood glucose for DM e11.65). Check fsbs qd, Disp: 100 each, Rfl: 3  •  lisinopril (PRINIVIL,ZESTRIL) 5 MG tablet, Take 1 tablet by mouth Daily., Disp: 90 tablet, Rfl: 3  •  metFORMIN ER (GLUCOPHAGE-XR) 500 MG 24 hr tablet, Take 1 tablet by mouth Daily for 346 days., Disp: 90 tablet, Rfl: 3  Allergies: Patient has no known allergies.    Physical Exam  Constitutional:       Appearance: Normal appearance.   Musculoskeletal:      Comments: No redness/ heat/ swelling noted to left foot. Minimal tenderness to palpate bunion area.    Neurological:      Mental Status: He is alert and oriented to person, place, and time.   Psychiatric:         Mood and Affect: Mood normal.         Behavior: Behavior normal.             Assessment and Plan   Diagnoses and all orders for this visit:    1. Left foot pain (Primary)  Symptoms of left foot pain have improved. Have contacted podiatry office again to get records- unavailable during appointment but will  contact patient to let him know of any f/up if needed. Otherwise, rtc for any return of symptoms.           Follow Up   No follow-ups on file.  Patient was given instructions and counseling regarding his condition or for health maintenance advice. Please see specific information pulled into the AVS if appropriate.     Anika Pope PA-C

## 2023-08-24 ENCOUNTER — TELEPHONE (OUTPATIENT)
Dept: FAMILY MEDICINE CLINIC | Facility: CLINIC | Age: 83
End: 2023-08-24

## 2023-08-24 ENCOUNTER — OFFICE VISIT (OUTPATIENT)
Dept: FAMILY MEDICINE CLINIC | Facility: CLINIC | Age: 83
End: 2023-08-24
Payer: MEDICARE

## 2023-08-24 VITALS
BODY MASS INDEX: 26.57 KG/M2 | OXYGEN SATURATION: 95 % | HEART RATE: 68 BPM | WEIGHT: 185.6 LBS | HEIGHT: 70 IN | SYSTOLIC BLOOD PRESSURE: 118 MMHG | DIASTOLIC BLOOD PRESSURE: 72 MMHG

## 2023-08-24 DIAGNOSIS — F03.90 DEMENTIA WITHOUT BEHAVIORAL DISTURBANCE: ICD-10-CM

## 2023-08-24 DIAGNOSIS — F41.8 SITUATIONAL ANXIETY: ICD-10-CM

## 2023-08-24 DIAGNOSIS — N18.31 TYPE 2 DIABETES MELLITUS WITH STAGE 3A CHRONIC KIDNEY DISEASE, WITHOUT LONG-TERM CURRENT USE OF INSULIN: Primary | ICD-10-CM

## 2023-08-24 DIAGNOSIS — Z79.899 ENCOUNTER FOR LONG-TERM (CURRENT) USE OF OTHER MEDICATIONS: ICD-10-CM

## 2023-08-24 DIAGNOSIS — U07.1 COVID-19 VIRUS INFECTION: ICD-10-CM

## 2023-08-24 DIAGNOSIS — F32.4 MAJOR DEPRESSIVE DISORDER WITH SINGLE EPISODE, IN PARTIAL REMISSION: ICD-10-CM

## 2023-08-24 DIAGNOSIS — E78.5 HYPERLIPIDEMIA LDL GOAL <70: ICD-10-CM

## 2023-08-24 DIAGNOSIS — I65.23 CAROTID ARTERY STENOSIS, ASYMPTOMATIC, BILATERAL: ICD-10-CM

## 2023-08-24 DIAGNOSIS — R53.1 GENERALIZED WEAKNESS: ICD-10-CM

## 2023-08-24 DIAGNOSIS — I10 BENIGN ESSENTIAL HYPERTENSION: ICD-10-CM

## 2023-08-24 DIAGNOSIS — E11.22 TYPE 2 DIABETES MELLITUS WITH STAGE 3A CHRONIC KIDNEY DISEASE, WITHOUT LONG-TERM CURRENT USE OF INSULIN: Primary | ICD-10-CM

## 2023-08-24 RX ORDER — CITALOPRAM 40 MG/1
40 TABLET ORAL DAILY
Qty: 90 TABLET | Refills: 3 | Status: SHIPPED | OUTPATIENT
Start: 2023-08-24

## 2023-08-24 RX ORDER — ATORVASTATIN CALCIUM 20 MG/1
20 TABLET, FILM COATED ORAL NIGHTLY
Qty: 90 TABLET | Refills: 3 | Status: SHIPPED | OUTPATIENT
Start: 2023-08-24

## 2023-08-24 RX ORDER — LISINOPRIL 5 MG/1
5 TABLET ORAL DAILY
Qty: 90 TABLET | Refills: 3 | Status: SHIPPED | OUTPATIENT
Start: 2023-08-24

## 2023-08-24 RX ORDER — DONEPEZIL HYDROCHLORIDE 10 MG/1
10 TABLET, FILM COATED ORAL EVERY EVENING
Qty: 90 TABLET | Refills: 3 | Status: SHIPPED | OUTPATIENT
Start: 2023-08-24

## 2023-08-24 RX ORDER — METFORMIN HYDROCHLORIDE 500 MG/1
500 TABLET, EXTENDED RELEASE ORAL DAILY
Qty: 90 TABLET | Refills: 3 | Status: SHIPPED | OUTPATIENT
Start: 2023-08-24 | End: 2024-08-04

## 2023-08-24 RX ORDER — BUSPIRONE HYDROCHLORIDE 5 MG/1
5 TABLET ORAL 2 TIMES DAILY
Qty: 60 TABLET | Refills: 5 | Status: SHIPPED | OUTPATIENT
Start: 2023-08-24

## 2023-08-24 RX ORDER — CLOPIDOGREL BISULFATE 75 MG/1
75 TABLET ORAL DAILY
Qty: 90 TABLET | Refills: 3 | Status: SHIPPED | OUTPATIENT
Start: 2023-08-24

## 2023-08-24 NOTE — TELEPHONE ENCOUNTER
Please call the patient, or better yet speak with his wife (Kim Garcia) as he is hard of hearing and has some degree of memory impairment.  I am very sorry that I did not catch this while he was here today, but I was running behind and read through his hospital notes rather quickly, and saw that he had had extensive laboratory testing done while there.  Since he was admitted, I just assumed that he had had an hemoglobin A1c test done, but upon further review, amazingly, I CANNOT find that they did one!  The Medicare quality measure guidelines necessitate that 1 is done every 6 months, and the last one we did in the office was in February.  Therefore, unless you can find evidence of a hemoglobin A1c being done during his hospital stay in [place] between August 7 and 10 of this year, I would really like for him to come by the office sometime in the next week or 2 to have a fingerstick A1c test done.  He will not need appointment for this and I will leave an order in the chart for it.  Tell them I am very sorry for the inconvenience and this should be a very quick in and out thing.  If you do find an A1c in the hospital computer, then please simply note that in the chart and update his care gaps.  Thank you

## 2023-08-24 NOTE — PROGRESS NOTES
"Chief Complaint  Hospital Follow Up Visit    Subjective      Orestes Garcia Jr. presents to [facility] PRIMARY CARE  History of Present Illness  I was under the impression the patient was here for a TCM visit, but after I saw the patient I looked through the chart and I do not see that we contacted the patient following his discharge.  Patient was admitted to [facility] on August 7, 2023 and discharged on August 10, 2023, for COVID with generalized weakness and inability to walk.  Patient had a dry cough but very minimal other symptoms other than generalized weakness, but he had fallen at home 3 times before it was decided that he should go to the emergency department, so his wife called the paramedics, who had already been out twice to help him get up off the floor.  Fortunately, he suffered no serious injuries.  He was not severe enough to require any supplemental oxygen, mechanical ventilation, or admission to the ICU, but was simply too weak to walk independently, so he was admitted mostly for general support and therapy.  By the time of his discharge she was able to walk independently using a walker, and he has continued to improve since coming home.  He says he still feels a little bit tired, but is much better than he was.  His hospital notes and discharge summary were reviewed.  He says his other medical problems are stable and he did have detailed labs during the hospital stay, so he is not due for additional labs at this time, but he does need refills on his chronic medications.  Objective   Vital Signs:   Vitals:    08/24/23 1010   BP: 118/72   BP Location: Left arm   Patient Position: Sitting   Cuff Size: Adult   Pulse: 68   SpO2: 95%   Weight: 84.2 kg (185 lb 9.6 oz)   Height: 177.8 cm (70\")      /72 (BP Location: Left arm, Patient Position: Sitting, Cuff Size: Adult)   Pulse 68   Ht 177.8 cm (70\")   Wt 84.2 kg (185 lb 9.6 oz)   SpO2 95%   BMI 26.63 " kg/mý     Body mass index is 26.63 kg/mý.    Review of Systems   Constitutional:  Negative for activity change, appetite change, chills, fever, unexpected weight gain and unexpected weight loss.   HENT:  Negative for sore throat and trouble swallowing.    Eyes:  Negative for visual disturbance.   Respiratory:  Negative for cough, chest tightness, shortness of breath and wheezing.    Cardiovascular:  Negative for chest pain, palpitations and leg swelling.   Gastrointestinal:  Negative for abdominal pain, blood in stool, constipation, diarrhea, nausea and vomiting.   Endocrine: Negative for polydipsia, polyphagia and polyuria.   Genitourinary:  Negative for dysuria and hematuria.   Musculoskeletal:  Negative for arthralgias, back pain, gait problem, joint swelling and myalgias.   Skin:  Negative for rash and wound.   Neurological:  Negative for tremors, seizures, syncope, facial asymmetry, speech difficulty, light-headedness and headache.   Hematological:  Negative for adenopathy.   Psychiatric/Behavioral:  Positive for sleep disturbance. Negative for self-injury, suicidal ideas and depressed mood. The patient is nervous/anxious.      Past History:  Medical History: has a past medical history of Anemia, Benign essential HTN, Carotid artery stenosis (07/2020), CKD (chronic kidney disease), stage III, Colon polyp (2012), Condition not found (2018), Dementia, Depression, Diverticular disease of colon, Diverticulosis, Hearing loss, High risk medication use, IBS (irritable bowel syndrome), Mixed hyperlipidemia, Overweight (BMI 25.0-29.9), Skin cancer, Transient cerebral ischemia (2019), and Type 2 diabetes mellitus.   Surgical History: has a past surgical history that includes Skin biopsy (Right, 2016); Colonoscopy (2012); Tonsillectomy (1950); Cataract extraction (2020); and Interventional radiology procedure (Bilateral, 8/31/2022).   Family History: family history includes Alzheimer's disease in his father; Cancer in his  mother; Coronary artery disease (age of onset: 86) in his father; Diabetes in his father; Hearing loss in his father; Hypertension in his mother; Pancreatic cancer in his mother.   Social History: reports that he has never smoked. He has never used smokeless tobacco. He reports that he does not drink alcohol and does not use drugs.      Current Outpatient Medications:     aspirin 81 MG EC tablet, Take 1 tablet by mouth Daily., Disp: , Rfl:     atorvastatin (LIPITOR) 20 MG tablet, Take 1 tablet by mouth Every Night., Disp: 90 tablet, Rfl: 3    Cholecalciferol (Vitamin D3) 25 MCG (1000 UT) capsule, Take 1 capsule by mouth Daily., Disp: , Rfl:     citalopram (CeleXA) 40 MG tablet, Take 1 tablet by mouth Daily., Disp: 90 tablet, Rfl: 3    clopidogrel (PLAVIX) 75 MG tablet, Take 1 tablet by mouth Daily., Disp: 90 tablet, Rfl: 3    donepezil (ARICEPT) 10 MG tablet, Take 1 tablet by mouth Every Evening., Disp: 90 tablet, Rfl: 3    glucose blood test strip, 1 each by Other route As Needed (to test blood glucose for DM e11.65). Check fsbs qd, Disp: 100 each, Rfl: 3    lisinopril (PRINIVIL,ZESTRIL) 5 MG tablet, Take 1 tablet by mouth Daily., Disp: 90 tablet, Rfl: 3    metFORMIN ER (GLUCOPHAGE-XR) 500 MG 24 hr tablet, Take 1 tablet by mouth Daily for 346 days., Disp: 90 tablet, Rfl: 3    busPIRone (BUSPAR) 5 MG tablet, Take 1 tablet by mouth 2 (Two) Times a Day. FOR ANXIETY, Disp: 60 tablet, Rfl: 5    Allergies: Patient has no known allergies.    Physical Exam  Constitutional:       General: He is not in acute distress.     Appearance: Normal appearance. He is not ill-appearing or toxic-appearing.   HENT:      Head: Normocephalic and atraumatic.      Right Ear: Ear canal and external ear normal.      Left Ear: Ear canal and external ear normal.      Nose: Nose normal.      Mouth/Throat:      Mouth: Mucous membranes are moist.      Pharynx: Oropharynx is clear.   Eyes:      General: No scleral icterus.     Extraocular  Movements: Extraocular movements intact.      Conjunctiva/sclera: Conjunctivae normal.      Pupils: Pupils are equal, round, and reactive to light.   Neck:      Vascular: No carotid bruit.   Cardiovascular:      Rate and Rhythm: Normal rate and regular rhythm.      Pulses: Normal pulses.      Heart sounds: Normal heart sounds.   Pulmonary:      Effort: Pulmonary effort is normal.      Breath sounds: Normal breath sounds.   Chest:      Chest wall: No tenderness.   Abdominal:      General: Bowel sounds are normal. There is no distension.      Palpations: Abdomen is soft.      Tenderness: There is no abdominal tenderness. There is no guarding or rebound.   Musculoskeletal:         General: No swelling or deformity. Normal range of motion.      Cervical back: Normal range of motion. No rigidity.      Right lower leg: No edema.      Left lower leg: No edema.   Lymphadenopathy:      Cervical: No cervical adenopathy.   Skin:     General: Skin is warm and dry.      Capillary Refill: Capillary refill takes less than 2 seconds.      Coloration: Skin is not pale.      Findings: No erythema or rash.   Neurological:      General: No focal deficit present.      Mental Status: He is alert and oriented to person, place, and time.      Cranial Nerves: No cranial nerve deficit.      Motor: No weakness.      Coordination: Coordination normal.      Gait: Gait normal.   Psychiatric:         Mood and Affect: Mood normal.         Behavior: Behavior normal.         Thought Content: Thought content normal.         Judgment: Judgment normal.                 Assessment and Plan   Diagnoses and all orders for this visit:    1. Type 2 diabetes mellitus with stage 3a chronic kidney disease, without long-term current use of insulin (Primary)  Patient's diabetes continues to be very well controlled with current medication, which is metformin extended release 5 mg once a day.  I was running a bit behind in clinic today and only had a chance to  briefly glance over his hospital records, and I unfortunately assumed that hemoglobin A1c would have been done upon admission, but upon further inspection, I cannot find 1.  His glucose was checked regularly and it remained very good during his hospital stay, but his last recorded A1c was in February of this year.  Therefore, we will asked the patient to come back for a fingerstick A1c test to be compliant with quality care guidelines, although the patient has been checking his blood sugar at home and he and his wife state they have been fine.  He did not need any other labs today.  His regular medicines were refilled for another 6 months.  2. COVID-19 virus infection  Patient is recovering nicely and is just about back to his baseline  3. Generalized weakness  This is much better and the patient is no longer needing a walker or cane to ambulate and has not had any further falls  4. Hyperlipidemia LDL goal <70  -     atorvastatin (LIPITOR) 20 MG tablet; Take 1 tablet by mouth Every Night.  Dispense: 90 tablet; Refill: 3    5. Carotid artery stenosis, asymptomatic, bilateral  -     atorvastatin (LIPITOR) 20 MG tablet; Take 1 tablet by mouth Every Night.  Dispense: 90 tablet; Refill: 3  No recent stroke or TIA symptoms and he is on appropriate therapy for this including aspirin 81 mg daily, Plavix 75 mg daily, and atorvastatin 20 mg daily.  6. Dementia without behavioral disturbance  Symptoms are stable with Aricept 10 mg daily and he will continue  7. Major depressive disorder with single episode, in partial remission  Patient and his wife agree that this is well controlled with Celexa 40 mg daily.  They understand that this dose is a bit higher than is usually considered safe in someone his age, but lower doses have been ineffective, and he is tolerating this higher dose well and benefited from it and is willing to accept the risk and wishes to continue on it.  8. Benign essential hypertension    9. Encounter for  long-term (current) use of other medications    10. Situational anxiety  When we were wrapping up, the patient's wife said that she wanted to mention that he seems very anxious at times to her.  The patient himself agrees that there are times when he gets anxious about his situation, and has a hard time calming down, and can be somewhat irritable at those times, but most the time he does not feel that way.  She says he seems anxious a lot of the time but agrees it is not continuous, and he is never exhibited any violent or inappropriate behaviors, and has not had panic attacks.  I discussed the different options including observation, changes in therapy, or adding BuSpar to the therapy, which would be my preference since his depression has done well with Celexa, and I think that might be the simplest way to help him.  However, as we discussed, there are always risks and side effects from any medication especially in someone who is 83.  The patient says that at this time he does not think that he needs additional medication, and he does not wish to do any counseling or talk therapy.  However, I did get him to agree to take a handwritten prescription for BuSpar with him so that he and his wife can continue this conversation and decide if it is something that he should try.  I explained that the medication is typically dosed twice a day to 3 times a day regularly and takes a week or 2 to kick in, but that we do have a lot of patients to use it off label on a as needed basis, and it often seems to work well like that.  He will give this some further thought and may fill the prescription if desired, and if he changes mind about making other changes in his therapy they will let me know.  Other orders  -     busPIRone (BUSPAR) 5 MG tablet; Take 1 tablet by mouth 2 (Two) Times a Day. FOR ANXIETY  Dispense: 60 tablet; Refill: 5  -     citalopram (CeleXA) 40 MG tablet; Take 1 tablet by mouth Daily.  Dispense: 90 tablet;  Refill: 3  -     clopidogrel (PLAVIX) 75 MG tablet; Take 1 tablet by mouth Daily.  Dispense: 90 tablet; Refill: 3  -     donepezil (ARICEPT) 10 MG tablet; Take 1 tablet by mouth Every Evening.  Dispense: 90 tablet; Refill: 3  -     lisinopril (PRINIVIL,ZESTRIL) 5 MG tablet; Take 1 tablet by mouth Daily.  Dispense: 90 tablet; Refill: 3  -     metFORMIN ER (GLUCOPHAGE-XR) 500 MG 24 hr tablet; Take 1 tablet by mouth Daily for 346 days.  Dispense: 90 tablet; Refill: 3            Follow Up   No follow-ups on file.  Patient was given instructions and counseling regarding his condition or for health maintenance advice. Please see specific information pulled into the AVS if appropriate.     Jose Alberto Vang MD

## 2023-08-25 NOTE — TELEPHONE ENCOUNTER
Please call the patient, or better yet speak with his wife (Kim Garcia) as he is hard of hearing and has some degree of memory impairment.  I am very sorry that I did not catch this while he was here today, but I was running behind and read through his hospital notes rather quickly, and saw that he had had extensive laboratory testing done while there.  Since he was admitted, I just assumed that he had had an hemoglobin A1c test done, but upon further review, amazingly, I CANNOT find that they did one!  The Medicare quality measure guidelines necessitate that 1 is done every 6 months, and the last one we did in the office was in February.  Therefore, unless you can find evidence of a hemoglobin A1c being done during his hospital stay in [place] between August 7 and 10 of this year, I would really like for him to come by the office sometime in the next week or 2 to have a fingerstick A1c test done.  He will not need appointment for this and I will leave an order in the chart for it.  Tell them I am very sorry for the inconvenience and this should be a very quick in and out thing.  If you do find an A1c in the hospital computer, then please simply note that in the chart and update his care gaps.  Thank you         Dr. Vang called Kim Garcia as requested advised to bring Orestes  in next 1-2 weeks for A1c   Called [facility] A1c was not done during that stay

## 2023-08-29 ENCOUNTER — LAB (OUTPATIENT)
Dept: FAMILY MEDICINE CLINIC | Facility: CLINIC | Age: 83
End: 2023-08-29
Payer: MEDICARE

## 2023-08-29 DIAGNOSIS — N18.31 TYPE 2 DIABETES MELLITUS WITH STAGE 3A CHRONIC KIDNEY DISEASE, WITHOUT LONG-TERM CURRENT USE OF INSULIN: Primary | ICD-10-CM

## 2023-08-29 DIAGNOSIS — E11.22 TYPE 2 DIABETES MELLITUS WITH STAGE 3A CHRONIC KIDNEY DISEASE, WITHOUT LONG-TERM CURRENT USE OF INSULIN: Primary | ICD-10-CM

## 2023-08-29 LAB
EXPIRATION DATE: NORMAL
HBA1C MFR BLD: 6.6 %
Lab: NORMAL

## 2023-10-26 ENCOUNTER — OFFICE VISIT (OUTPATIENT)
Dept: CARDIOLOGY | Facility: CLINIC | Age: 83
End: 2023-10-26
Payer: MEDICARE

## 2023-10-26 VITALS
RESPIRATION RATE: 17 BRPM | DIASTOLIC BLOOD PRESSURE: 62 MMHG | OXYGEN SATURATION: 96 % | BODY MASS INDEX: 27.06 KG/M2 | HEIGHT: 70 IN | SYSTOLIC BLOOD PRESSURE: 123 MMHG | HEART RATE: 72 BPM | WEIGHT: 189 LBS

## 2023-10-26 DIAGNOSIS — N18.31 TYPE 2 DIABETES MELLITUS WITH STAGE 3A CHRONIC KIDNEY DISEASE, WITHOUT LONG-TERM CURRENT USE OF INSULIN: ICD-10-CM

## 2023-10-26 DIAGNOSIS — E11.22 TYPE 2 DIABETES MELLITUS WITH STAGE 3A CHRONIC KIDNEY DISEASE, WITHOUT LONG-TERM CURRENT USE OF INSULIN: ICD-10-CM

## 2023-10-26 DIAGNOSIS — I10 BENIGN ESSENTIAL HYPERTENSION: Primary | ICD-10-CM

## 2023-10-26 DIAGNOSIS — E78.5 HYPERLIPIDEMIA LDL GOAL <70: ICD-10-CM

## 2023-10-26 DIAGNOSIS — I65.23 CAROTID ARTERY STENOSIS, ASYMPTOMATIC, BILATERAL: ICD-10-CM

## 2023-10-26 NOTE — PROGRESS NOTES
[facility]  [facility] CARDIOLOGY  [address] [place] [address]  Dept: [phone]  Dept Fax: [phone]    Orestes Garcia Jr.  1940    Follow Up Office Visit Note    History of Present Illness:  Orestes Garcia Jr. is a 83 y.o. male who presents to the clinic for Follow-up. 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    Medications:  aspirin  atorvastatin  busPIRone  citalopram  clopidogrel  donepezil  glucose blood  lisinopril  metFORMIN ER  Vitamin D3 capsule    Subjective  No Known Allergies     Past Medical History:   Diagnosis Date   • Anemia    • Benign essential HTN    • Carotid artery stenosis 07/2020    RIGHT CAROTID  40-60% STENOSIS ON DOPPLER RECORD SAYS H/O TIA BUT PT DENIES THIS??   • CKD (chronic kidney disease), stage III    • Colon polyp 2012    LAST C QUESTIONABLE   • Condition not found 2018    COLOGUARD NEG 9-18   • Dementia    • Depression    • Diverticular disease of colon     WITHOUT HEMORRHAGE   • Diverticulosis     WITH LOWR GI BLEED APPROS AGE 49   • Hearing loss    • High risk medication use    • IBS (irritable bowel syndrome)     WITH DIARRHEA   • Mixed hyperlipidemia    • Overweight (BMI 25.0-29.9)    • Skin cancer     LEG   • Transient cerebral ischemia 2019    POSSIBLE A/W FALL   • Type 2 diabetes mellitus     WITH STAGE 3 CKD WITHOUT LONG TERM CURRENT USE OF INSULIN   WITH POTEINURIA       Past Surgical History:   Procedure Laterality Date   • CATARACT EXTRACTION  2020   • COLONOSCOPY  2012    [facility]   • INTERVENTIONAL RADIOLOGY PROCEDURE Bilateral 8/31/2022    Procedure: Carotid Cerebral  "Angiogram;  Surgeon: Stan Falk MD;  Location: [facility] INVASIVE LOCATION;  Service: Interventional Radiology;  Laterality: Bilateral;   • SKIN BIOPSY Right 2016    LEG   • TONSILLECTOMY  1950       Family History   Problem Relation Age of Onset   • Cancer Mother    • Hypertension Mother    • Pancreatic cancer Mother    • Alzheimer's disease Father    • Coronary artery disease Father 86   • Diabetes Father    • Hearing loss Father         Social History     Socioeconomic History   • Marital status:    Tobacco Use   • Smoking status: Never   • Smokeless tobacco: Never   Vaping Use   • Vaping Use: Never used   Substance and Sexual Activity   • Alcohol use: Never   • Drug use: Never   • Sexual activity: Defer       Review of Systems   Constitutional: Negative.    HENT: Negative.     Respiratory: Negative.     Cardiovascular: Negative.    Endocrine: Negative.    Genitourinary: Negative.    Musculoskeletal: Negative.    Skin: Negative.    Allergic/Immunologic: Negative.    Neurological: Negative.    Hematological: Negative.    Psychiatric/Behavioral: Negative.       Cardiovascular Procedures    ECHO/MUGA:  STRESS TESTS:   CARDIAC CATH:   DEVICES:   HOLTER:   CT/MRI:   VASCULAR:   CARDIOTHORACIC:     Objective  Vitals:    10/26/23 1403   BP: 123/62   BP Location: Right arm   Patient Position: Sitting   Cuff Size: Large Adult   Pulse: 72   Resp: 17   SpO2: 96%   Weight: 85.7 kg (189 lb)   Height: 177.8 cm (70\")   PainSc: 0-No pain     Body mass index is 27.12 kg/m².     Physical Exam  Vitals reviewed.   Constitutional:       Appearance: Healthy appearance. Not in distress.   Eyes:      Pupils: Pupils are equal, round, and reactive to light.   HENT:    Mouth/Throat:      Pharynx: Oropharynx is clear.   Neck:      Thyroid: Thyroid normal.      Vascular: No JVR. JVD normal.   Pulmonary:      Effort: Pulmonary effort is normal.      Breath sounds: Normal breath sounds. No wheezing. No rhonchi. No rales. "   Chest:      Chest wall: Not tender to palpatation.   Cardiovascular:      PMI at left midclavicular line. Normal rate. Regular rhythm. Normal S1. Normal S2.       Murmurs: There is no murmur.      No gallop.  No click. No rub.   Pulses:     Intact distal pulses.      Carotid: 3+ bilaterally.     Radial: 3+ bilaterally.     Femoral: 3+ bilaterally.     Dorsalis pedis: 3+ bilaterally.     Posterior tibial: 3+ bilaterally.  Edema:     Peripheral edema absent.   Abdominal:      General: Bowel sounds are normal.      Palpations: Abdomen is soft.      Tenderness: There is no abdominal tenderness.   Musculoskeletal: Normal range of motion.         General: No tenderness.      Cervical back: Normal range of motion and neck supple. Skin:     General: Skin is warm and dry.   Neurological:      General: No focal deficit present.      Mental Status: Alert and oriented to person, place and time.        Diagnostic Data    ECG 12 Lead    Date/Time: 10/26/2023 2:44 PM  Performed by: Greg Wilkerson MD    Authorized by: Greg Wilkerson MD  Comparison: compared with previous ECG from 7/14/2022  Similar to previous ECG  Rhythm: sinus rhythm  Rate: normal  BPM: 59  QRS axis: normal  Other findings: non-specific ST-T wave changes    Clinical impression: normal ECG        Assessment and Plan  Diagnoses and all orders for this visit:    Benign essential hypertension- BP is good 120.80 just on low dose Lisinopril 5 mg    Carotid artery stenosis, asymptomatic, bilateral Moderate disease 50% on    Hyperlipidemia LDL goal <70- On lipitor 20 mg, LDL is 54.    Type 2 diabetes mellitus with stage 3a chronic kidney disease, without long-term current use of insulin         Return in about 6 months (around 4/26/2024) for Recheck with RANI Subramanian.    Greg Wiklerson MD  10/26/2023

## 2024-01-08 DIAGNOSIS — E11.22 TYPE 2 DIABETES MELLITUS WITH STAGE 3A CHRONIC KIDNEY DISEASE, WITHOUT LONG-TERM CURRENT USE OF INSULIN: ICD-10-CM

## 2024-01-08 DIAGNOSIS — N18.31 TYPE 2 DIABETES MELLITUS WITH STAGE 3A CHRONIC KIDNEY DISEASE, WITHOUT LONG-TERM CURRENT USE OF INSULIN: ICD-10-CM

## 2024-01-08 NOTE — TELEPHONE ENCOUNTER
Caller: Kim Garcia    Relationship: Emergency Contact    Best call back number: [phone]     Requested Prescriptions:   Requested Prescriptions     Pending Prescriptions Disp Refills    glucose blood test strip 100 each 3     Si each by Other route As Needed (to test blood glucose for DM e11.65). Check fsbs         Pharmacy where request should be sent: [address] - [phone] [facility] [phone]      Last office visit with prescribing clinician: 2023   Last telemedicine visit with prescribing clinician: Visit date not found   Next office visit with prescribing clinician: Visit date not found     Additional details provided by patient: PATIENT USES ONE TOUCH TEST STRIPS     Does the patient have less than a 3 day supply:  [] Yes  [] No UNKNOWN    Would you like a call back once the refill request has been completed: [] Yes [x] No    If the office needs to give you a call back, can they leave a voicemail: [] Yes [x] No    René Beach Rep   24 14:43 EST

## 2024-02-16 ENCOUNTER — OFFICE VISIT (OUTPATIENT)
Dept: FAMILY MEDICINE CLINIC | Facility: CLINIC | Age: 84
End: 2024-02-16
Payer: MEDICARE

## 2024-02-16 VITALS
BODY MASS INDEX: 27.35 KG/M2 | HEART RATE: 54 BPM | HEIGHT: 70 IN | WEIGHT: 191 LBS | SYSTOLIC BLOOD PRESSURE: 130 MMHG | OXYGEN SATURATION: 99 % | DIASTOLIC BLOOD PRESSURE: 80 MMHG

## 2024-02-16 DIAGNOSIS — Z79.899 ENCOUNTER FOR LONG-TERM (CURRENT) USE OF OTHER MEDICATIONS: ICD-10-CM

## 2024-02-16 DIAGNOSIS — F03.90 DEMENTIA WITHOUT BEHAVIORAL DISTURBANCE: ICD-10-CM

## 2024-02-16 DIAGNOSIS — E78.5 HYPERLIPIDEMIA LDL GOAL <70: ICD-10-CM

## 2024-02-16 DIAGNOSIS — N18.31 TYPE 2 DIABETES MELLITUS WITH STAGE 3A CHRONIC KIDNEY DISEASE, WITHOUT LONG-TERM CURRENT USE OF INSULIN: ICD-10-CM

## 2024-02-16 DIAGNOSIS — F41.8 SITUATIONAL ANXIETY: ICD-10-CM

## 2024-02-16 DIAGNOSIS — Z79.899 HIGH RISK MEDICATION USE: ICD-10-CM

## 2024-02-16 DIAGNOSIS — F32.4 MAJOR DEPRESSIVE DISORDER WITH SINGLE EPISODE, IN PARTIAL REMISSION: ICD-10-CM

## 2024-02-16 DIAGNOSIS — R53.83 OTHER FATIGUE: ICD-10-CM

## 2024-02-16 DIAGNOSIS — Z00.01 ENCOUNTER FOR GENERAL ADULT MEDICAL EXAMINATION WITH ABNORMAL FINDINGS: Primary | ICD-10-CM

## 2024-02-16 DIAGNOSIS — E11.22 TYPE 2 DIABETES MELLITUS WITH STAGE 3A CHRONIC KIDNEY DISEASE, WITHOUT LONG-TERM CURRENT USE OF INSULIN: ICD-10-CM

## 2024-02-16 DIAGNOSIS — K58.0 IRRITABLE BOWEL SYNDROME WITH DIARRHEA: ICD-10-CM

## 2024-02-16 DIAGNOSIS — R41.3 MEMORY LOSS: ICD-10-CM

## 2024-02-16 DIAGNOSIS — I10 BENIGN ESSENTIAL HYPERTENSION: ICD-10-CM

## 2024-02-16 RX ORDER — BUSPIRONE HYDROCHLORIDE 5 MG/1
5 TABLET ORAL 2 TIMES DAILY
Qty: 180 TABLET | Refills: 3 | Status: SHIPPED | OUTPATIENT
Start: 2024-02-16

## 2024-02-16 RX ORDER — BUSPIRONE HYDROCHLORIDE 5 MG/1
5 TABLET ORAL 2 TIMES DAILY
Qty: 180 TABLET | Refills: 3 | Status: SHIPPED | OUTPATIENT
Start: 2024-02-16 | End: 2024-02-16 | Stop reason: SDUPTHER

## 2024-02-17 LAB
ALBUMIN SERPL-MCNC: 4.3 G/DL (ref 3.7–4.7)
ALBUMIN/GLOB SERPL: 1.5 {RATIO} (ref 1.2–2.2)
ALP SERPL-CCNC: 85 IU/L (ref 44–121)
ALT SERPL-CCNC: 29 IU/L (ref 0–44)
AST SERPL-CCNC: 19 IU/L (ref 0–40)
BASOPHILS # BLD AUTO: 0.1 X10E3/UL (ref 0–0.2)
BASOPHILS NFR BLD AUTO: 1 %
BILIRUB SERPL-MCNC: 0.9 MG/DL (ref 0–1.2)
BUN SERPL-MCNC: 21 MG/DL (ref 8–27)
BUN/CREAT SERPL: 15 (ref 10–24)
CALCIUM SERPL-MCNC: 9.2 MG/DL (ref 8.6–10.2)
CHLORIDE SERPL-SCNC: 106 MMOL/L (ref 96–106)
CHOLEST SERPL-MCNC: 127 MG/DL (ref 100–199)
CO2 SERPL-SCNC: 21 MMOL/L (ref 20–29)
CREAT SERPL-MCNC: 1.38 MG/DL (ref 0.76–1.27)
EGFRCR SERPLBLD CKD-EPI 2021: 50 ML/MIN/1.73
EOSINOPHIL # BLD AUTO: 0.1 X10E3/UL (ref 0–0.4)
EOSINOPHIL NFR BLD AUTO: 1 %
ERYTHROCYTE [DISTWIDTH] IN BLOOD BY AUTOMATED COUNT: 13 % (ref 11.6–15.4)
FOLATE SERPL-MCNC: >20 NG/ML
GLOBULIN SER CALC-MCNC: 2.8 G/DL (ref 1.5–4.5)
GLUCOSE SERPL-MCNC: 90 MG/DL (ref 70–99)
HBA1C MFR BLD: 7.3 % (ref 4.8–5.6)
HCT VFR BLD AUTO: 44.3 % (ref 37.5–51)
HDLC SERPL-MCNC: 46 MG/DL
HGB BLD-MCNC: 14.9 G/DL (ref 13–17.7)
IMM GRANULOCYTES # BLD AUTO: 0 X10E3/UL (ref 0–0.1)
IMM GRANULOCYTES NFR BLD AUTO: 0 %
LDLC SERPL CALC-MCNC: 59 MG/DL (ref 0–99)
LYMPHOCYTES # BLD AUTO: 1.4 X10E3/UL (ref 0.7–3.1)
LYMPHOCYTES NFR BLD AUTO: 15 %
MAGNESIUM SERPL-MCNC: 2.2 MG/DL (ref 1.6–2.3)
MCH RBC QN AUTO: 30 PG (ref 26.6–33)
MCHC RBC AUTO-ENTMCNC: 33.6 G/DL (ref 31.5–35.7)
MCV RBC AUTO: 89 FL (ref 79–97)
MONOCYTES # BLD AUTO: 1.1 X10E3/UL (ref 0.1–0.9)
MONOCYTES NFR BLD AUTO: 11 %
NEUTROPHILS # BLD AUTO: 6.9 X10E3/UL (ref 1.4–7)
NEUTROPHILS NFR BLD AUTO: 72 %
PLATELET # BLD AUTO: 176 X10E3/UL (ref 150–450)
POTASSIUM SERPL-SCNC: 4.3 MMOL/L (ref 3.5–5.2)
PROT SERPL-MCNC: 7.1 G/DL (ref 6–8.5)
RBC # BLD AUTO: 4.96 X10E6/UL (ref 4.14–5.8)
SODIUM SERPL-SCNC: 145 MMOL/L (ref 134–144)
T4 FREE SERPL-MCNC: 0.95 NG/DL (ref 0.82–1.77)
TRIGL SERPL-MCNC: 120 MG/DL (ref 0–149)
TSH SERPL DL<=0.005 MIU/L-ACNC: 2.97 UIU/ML (ref 0.45–4.5)
VIT B12 SERPL-MCNC: 592 PG/ML (ref 232–1245)
VLDLC SERPL CALC-MCNC: 22 MG/DL (ref 5–40)
WBC # BLD AUTO: 9.6 X10E3/UL (ref 3.4–10.8)

## 2024-03-26 ENCOUNTER — TELEPHONE (OUTPATIENT)
Dept: FAMILY MEDICINE CLINIC | Facility: CLINIC | Age: 84
End: 2024-03-26
Payer: MEDICARE

## 2024-03-26 NOTE — TELEPHONE ENCOUNTER
Caller: Kim Garcia    Relationship to patient: Emergency Contact    Best call back number:      Chief complaint: NERVE ISSUE    Type of visit: OFFICE VISIT    Requested date: MONDAY 040124    If rescheduling, when is the original appointment: NA     Additional notes:PLEASE CALL TO ADVISE OF AN APPT AS DR CARRERA DIDN'T HAVE ANY OPENINGS UNTIL [id number]

## 2024-04-01 ENCOUNTER — OFFICE VISIT (OUTPATIENT)
Dept: FAMILY MEDICINE CLINIC | Facility: CLINIC | Age: 84
End: 2024-04-01
Payer: MEDICARE

## 2024-04-01 VITALS
HEIGHT: 70 IN | WEIGHT: 191.7 LBS | HEART RATE: 67 BPM | SYSTOLIC BLOOD PRESSURE: 102 MMHG | BODY MASS INDEX: 27.44 KG/M2 | DIASTOLIC BLOOD PRESSURE: 68 MMHG | OXYGEN SATURATION: 94 %

## 2024-04-01 DIAGNOSIS — F41.1 GAD (GENERALIZED ANXIETY DISORDER): ICD-10-CM

## 2024-04-01 DIAGNOSIS — F32.4 MAJOR DEPRESSIVE DISORDER WITH SINGLE EPISODE, IN PARTIAL REMISSION: ICD-10-CM

## 2024-04-01 DIAGNOSIS — F51.05 INSOMNIA DUE TO ANXIETY AND FEAR: Primary | ICD-10-CM

## 2024-04-01 DIAGNOSIS — F40.9 INSOMNIA DUE TO ANXIETY AND FEAR: Primary | ICD-10-CM

## 2024-04-01 PROCEDURE — 99213 OFFICE O/P EST LOW 20 MIN: CPT | Performed by: FAMILY MEDICINE

## 2024-04-01 RX ORDER — TRAZODONE HYDROCHLORIDE 50 MG/1
TABLET ORAL
Qty: 90 TABLET | Refills: 2 | Status: SHIPPED | OUTPATIENT
Start: 2024-04-01

## 2024-04-01 NOTE — PROGRESS NOTES
"Chief Complaint  Anxiety (Anxiety /Insomina )    Subjective      Orestes Garcia  presents to [facility] PRIMARY CARE  History of Present Illness  Patient is here because he has been having more trouble with nighttime anxiety and insomnia.  He says that he thinks his daytime anxiety is pretty well-controlled with the BuSpar, so he is not having as much trouble with the anxiety related diarrhea in the daytime.  However, for the past 4 to 6 months, he is having a lot of difficulty falling asleep at night.  He says once he falls asleep he is okay, but it can take a couple of hours at night to fall asleep.  He says he does worry a lot about political climate in the country in the upcoming presidential election, although he realizes that there is not a lot that he can do about it.  He thinks his depression is currently well-controlled.  Denies any thoughts of harming self or others  Objective   Vital Signs:   Vitals:    04/01/24 1059   BP: 102/68   BP Location: Left arm   Patient Position: Sitting   Cuff Size: Adult   Pulse: 67   SpO2: 94%   Weight: 87 kg (191 lb 11.2 oz)   Height: 177.8 cm (70\")      /68 (BP Location: Left arm, Patient Position: Sitting, Cuff Size: Adult)   Pulse 67   Ht 177.8 cm (70\")   Wt 87 kg (191 lb 11.2 oz)   SpO2 94%   BMI 27.51 kg/m²     Body mass index is 27.51 kg/m².    Review of Systems    Past History:  Medical History: has a past medical history of Anemia, Benign essential HTN, Carotid artery stenosis (07/2020), CKD (chronic kidney disease), stage III, Colon polyp (2012), Condition not found (2018), Dementia, Depression, Diverticular disease of colon, Diverticulosis, Hearing loss, High risk medication use, IBS (irritable bowel syndrome), Mixed hyperlipidemia, Overweight (BMI 25.0-29.9), Skin cancer, Transient cerebral ischemia (2019), and Type 2 diabetes mellitus.   Surgical History: has a past surgical history that includes Skin biopsy (Right, 2016); " Colonoscopy (2012); Tonsillectomy (1950); Cataract extraction (2020); and Interventional radiology procedure (Bilateral, 8/31/2022).   Family History: family history includes Alzheimer's disease in his father; Cancer in his mother; Coronary artery disease (age of onset: 86) in his father; Diabetes in his father; Hearing loss in his father; Hypertension in his mother; Pancreatic cancer in his mother.   Social History: reports that he has never smoked. He has never used smokeless tobacco. He reports that he does not drink alcohol and does not use drugs.      Current Outpatient Medications:     aspirin 81 MG EC tablet, Take 1 tablet by mouth Daily. occasionally, Disp: , Rfl:     atorvastatin (LIPITOR) 20 MG tablet, Take 1 tablet by mouth Every Night., Disp: 90 tablet, Rfl: 3    busPIRone (BUSPAR) 5 MG tablet, Take 1 tablet by mouth 2 (Two) Times a Day. FOR ANXIETY, Disp: 180 tablet, Rfl: 3    Cholecalciferol (Vitamin D3) 25 MCG (1000 UT) capsule, Take 1 capsule by mouth Daily., Disp: , Rfl:     citalopram (CeleXA) 40 MG tablet, Take 1 tablet by mouth Daily., Disp: 90 tablet, Rfl: 3    clopidogrel (PLAVIX) 75 MG tablet, Take 1 tablet by mouth Daily., Disp: 90 tablet, Rfl: 3    donepezil (ARICEPT) 10 MG tablet, Take 1 tablet by mouth Every Evening., Disp: 90 tablet, Rfl: 3    glucose blood test strip, 1 each by Other route As Needed (to test blood glucose for DM e11.65). Check fsbs qd, Disp: 100 each, Rfl: 0    lisinopril (PRINIVIL,ZESTRIL) 5 MG tablet, Take 1 tablet by mouth Daily., Disp: 90 tablet, Rfl: 3    metFORMIN ER (GLUCOPHAGE-XR) 500 MG 24 hr tablet, Take 1 tablet by mouth Daily for 346 days., Disp: 90 tablet, Rfl: 3    traZODone (DESYREL) 50 MG tablet, Take 1-2 pills po 30-60 min before bedtime to help with nerves and sleep, Disp: 90 tablet, Rfl: 2    Allergies: Patient has no known allergies.    Physical Exam  Constitutional:       General: He is not in acute distress.     Appearance: Normal appearance. He is  not toxic-appearing.   HENT:      Head: Normocephalic.      Right Ear: External ear normal.      Left Ear: External ear normal.      Nose: Nose normal.      Mouth/Throat:      Mouth: Mucous membranes are moist.      Pharynx: Oropharynx is clear.   Eyes:      Extraocular Movements: Extraocular movements intact.      Conjunctiva/sclera: Conjunctivae normal.      Pupils: Pupils are equal, round, and reactive to light.   Cardiovascular:      Rate and Rhythm: Normal rate and regular rhythm.      Pulses: Normal pulses.      Heart sounds: Normal heart sounds.   Musculoskeletal:      Cervical back: Normal range of motion.   Lymphadenopathy:      Cervical: No cervical adenopathy.   Skin:     Capillary Refill: Capillary refill takes less than 2 seconds.   Neurological:      Mental Status: He is alert. Mental status is at baseline.      Cranial Nerves: Cranial nerves 2-12 are intact.      Motor: Motor function is intact.      Coordination: Coordination is intact.      Gait: Gait is intact.   Psychiatric:         Attention and Perception: Attention and perception normal.         Mood and Affect: Mood is anxious. Mood is not depressed.         Speech: Speech normal.         Behavior: Behavior normal.         Thought Content: Thought content is not paranoid or delusional. Thought content does not include homicidal or suicidal ideation.         Cognition and Memory: Cognition normal. He exhibits impaired recent memory.         Judgment: Judgment is impulsive.                   Assessment and Plan   Diagnoses and all orders for this visit:    1. 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  Patient does not wish to see a therapist at this time, but we did discuss the possibility of doing some counseling and talk therapy in the future.  We will try trazodone 50 mg, 1 to 2 pills at at bedtime as needed, side effects and risk were  discussed.  If this is ineffective or poorly tolerated, then he will let me know.  2. Major depressive disorder with single episode, in partial remission  He says his depression is well-controlled with citalopram 40 mg daily and he will continue  3. DEEP (generalized anxiety disorder)  Patient says his daytime anxiety is doing better with the addition of BuSpar 5 mg twice a day, but still has a lot of anxiety at bedtime and interferes with sleep, and will let me know if the trazodone does not help with this  Other orders  -     traZODone (DESYREL) 50 MG tablet; Take 1-2 pills po 30-60 min before bedtime to help with nerves and sleep  Dispense: 90 tablet; Refill: 2            Follow Up   No follow-ups on file.  Patient was given instructions and counseling regarding his condition or for health maintenance advice. Please see specific information pulled into the AVS if appropriate.     Jose Alberto Vang MD

## 2024-05-03 ENCOUNTER — OFFICE VISIT (OUTPATIENT)
Dept: CARDIOLOGY | Facility: CLINIC | Age: 84
End: 2024-05-03
Payer: MEDICARE

## 2024-05-03 VITALS
WEIGHT: 192 LBS | RESPIRATION RATE: 16 BRPM | SYSTOLIC BLOOD PRESSURE: 120 MMHG | BODY MASS INDEX: 27.49 KG/M2 | HEIGHT: 70 IN | DIASTOLIC BLOOD PRESSURE: 60 MMHG | HEART RATE: 86 BPM | OXYGEN SATURATION: 99 %

## 2024-05-03 DIAGNOSIS — I10 BENIGN ESSENTIAL HYPERTENSION: ICD-10-CM

## 2024-05-03 DIAGNOSIS — E11.22 TYPE 2 DIABETES MELLITUS WITH STAGE 3A CHRONIC KIDNEY DISEASE, WITHOUT LONG-TERM CURRENT USE OF INSULIN: ICD-10-CM

## 2024-05-03 DIAGNOSIS — I65.23 CAROTID ARTERY STENOSIS, ASYMPTOMATIC, BILATERAL: Primary | ICD-10-CM

## 2024-05-03 DIAGNOSIS — N18.31 TYPE 2 DIABETES MELLITUS WITH STAGE 3A CHRONIC KIDNEY DISEASE, WITHOUT LONG-TERM CURRENT USE OF INSULIN: ICD-10-CM

## 2024-05-03 DIAGNOSIS — E78.5 HYPERLIPIDEMIA LDL GOAL <70: ICD-10-CM

## 2024-05-03 NOTE — PROGRESS NOTES
[facility] [place]  [facility] CARDIOLOGY  [address] [place] [address]  Dept: [phone]  Dept Fax: [phone]    Orestes Garcia Jr.  1940    Follow Up Office Visit Note    History of Present Illness:  Orestes Garcia Jr. is a 84 y.o. male who presents to the clinic for Follow-up. Carotid artery disease- Moderate disease by angiogram, 505 and right vertebral 100%, on medical treatment ASA and Plavix, no bleeding, no complaints     The following portions of the patient's history were reviewed and updated as appropriate: allergies, current medications, past family history, past medical history, past social history, past surgical history, and problem list.    Medications:  aspirin  atorvastatin  busPIRone  citalopram  clopidogrel  donepezil  glucose blood  lisinopril  metFORMIN ER  traZODone  Vitamin D3 capsule    Subjective  No Known Allergies     Past Medical History:   Diagnosis Date    Anemia     Benign essential HTN     Carotid artery stenosis 07/2020    RIGHT CAROTID  40-60% STENOSIS ON DOPPLER RECORD SAYS H/O TIA BUT PT DENIES THIS??    CKD (chronic kidney disease), stage III     Colon polyp 2012    LAST C QUESTIONABLE    Condition not found 2018    COLOGUARD NEG 9-18    Dementia     Depression     Diverticular disease of colon     WITHOUT HEMORRHAGE    Diverticulosis     WITH LOWR GI BLEED APPROS AGE 49    Hearing loss     High risk medication use     IBS (irritable bowel syndrome)     WITH DIARRHEA    Mixed hyperlipidemia     Overweight (BMI 25.0-29.9)     Skin cancer     LEG    Transient cerebral ischemia 2019    POSSIBLE A/W FALL    Type 2 diabetes mellitus     WITH STAGE 3 CKD WITHOUT LONG TERM CURRENT USE OF INSULIN   WITH POTEINURIA       Past Surgical History:   Procedure Laterality Date    CATARACT EXTRACTION  2020    COLONOSCOPY  2012    [facility]    INTERVENTIONAL RADIOLOGY PROCEDURE Bilateral 8/31/2022    Procedure: Carotid Cerebral Angiogram;  Surgeon: Stan Falk  "MD;  Location: [facility] INVASIVE LOCATION;  Service: Interventional Radiology;  Laterality: Bilateral;    SKIN BIOPSY Right 2016    LEG    TONSILLECTOMY  1950       Family History   Problem Relation Age of Onset    Cancer Mother     Hypertension Mother     Pancreatic cancer Mother     Alzheimer's disease Father     Coronary artery disease Father 86    Diabetes Father     Hearing loss Father         Social History     Socioeconomic History    Marital status:    Tobacco Use    Smoking status: Never    Smokeless tobacco: Never   Vaping Use    Vaping status: Never Used   Substance and Sexual Activity    Alcohol use: Never    Drug use: Never    Sexual activity: Defer       Review of Systems   Constitutional: Negative.    HENT: Negative.     Respiratory: Negative.     Cardiovascular: Negative.    Endocrine: Negative.    Genitourinary: Negative.    Musculoskeletal: Negative.    Skin: Negative.    Allergic/Immunologic: Negative.    Neurological: Negative.    Hematological: Negative.    Psychiatric/Behavioral: Negative.       Cardiovascular Procedures    ECHO/MUGA:  STRESS TESTS:   CARDIAC CATH:   DEVICES:   HOLTER:   CT/MRI:   VASCULAR:   CARDIOTHORACIC:     Objective  Vitals:    05/03/24 1253   BP: 120/60   BP Location: Right arm   Patient Position: Lying   Cuff Size: Adult   Pulse: 86   Resp: 16   SpO2: 99%   Weight: 87.1 kg (192 lb)   Height: 177.8 cm (70\")   PainSc: 0-No pain     Body mass index is 27.55 kg/m².     Physical Exam  Vitals reviewed.   Constitutional:       Appearance: Healthy appearance. Not in distress.   Neck:      Vascular: No JVR. JVD normal.   Pulmonary:      Effort: Pulmonary effort is normal.      Breath sounds: Normal breath sounds. No wheezing. No rhonchi. No rales.   Chest:      Chest wall: Not tender to palpatation.   Cardiovascular:      PMI at left midclavicular line. Normal rate. Regular rhythm. Normal S1. Normal S2.       Murmurs: There is no murmur.      No gallop.  No click. No " rub.   Pulses:     Intact distal pulses.   Edema:     Peripheral edema absent.   Abdominal:      General: Bowel sounds are normal.      Palpations: Abdomen is soft.      Tenderness: There is no abdominal tenderness.   Musculoskeletal: Normal range of motion.         General: No tenderness. Skin:     General: Skin is warm and dry.   Neurological:      General: No focal deficit present.      Mental Status: Alert and oriented to person, place and time.        Diagnostic Data  Procedures    Assessment and Plan  Diagnoses and all orders for this visit:    Carotid artery stenosis, asymptomatic, bilateral- Asymptomatic, on ASA and Plavix, has 50% by angiogram    Benign essential hypertension- BP is 110.60, on Low dose Lisinopril 5 mg    Hyperlipidemia LDL goal <70- On Lipitor 20 mg LDL 55, tolerates well     Type 2 diabetes mellitus with stage 3a chronic kidney disease, without long-term current use of insulin         Return in about 6 months (around 11/3/2024) for Recheck with Dr. Wilkerson.    Greg Wilkerson MD  05/03/2024

## 2024-05-06 ENCOUNTER — OFFICE VISIT (OUTPATIENT)
Dept: FAMILY MEDICINE CLINIC | Facility: CLINIC | Age: 84
End: 2024-05-06
Payer: MEDICARE

## 2024-05-06 VITALS
HEART RATE: 49 BPM | OXYGEN SATURATION: 97 % | TEMPERATURE: 98.5 F | BODY MASS INDEX: 27.95 KG/M2 | DIASTOLIC BLOOD PRESSURE: 78 MMHG | SYSTOLIC BLOOD PRESSURE: 124 MMHG | WEIGHT: 194.8 LBS

## 2024-05-06 DIAGNOSIS — H66.002 NON-RECURRENT ACUTE SUPPURATIVE OTITIS MEDIA OF LEFT EAR WITHOUT SPONTANEOUS RUPTURE OF TYMPANIC MEMBRANE: ICD-10-CM

## 2024-05-06 DIAGNOSIS — H61.23 BILATERAL IMPACTED CERUMEN: Primary | ICD-10-CM

## 2024-05-06 PROCEDURE — 99213 OFFICE O/P EST LOW 20 MIN: CPT | Performed by: INTERNAL MEDICINE

## 2024-05-06 PROCEDURE — 1126F AMNT PAIN NOTED NONE PRSNT: CPT | Performed by: INTERNAL MEDICINE

## 2024-05-06 PROCEDURE — 1159F MED LIST DOCD IN RCRD: CPT | Performed by: INTERNAL MEDICINE

## 2024-05-06 PROCEDURE — 1160F RVW MEDS BY RX/DR IN RCRD: CPT | Performed by: INTERNAL MEDICINE

## 2024-05-06 RX ORDER — AMOXICILLIN 875 MG/1
875 TABLET, COATED ORAL 2 TIMES DAILY
Qty: 14 TABLET | Refills: 0 | Status: SHIPPED | OUTPATIENT
Start: 2024-05-06 | End: 2024-05-13

## 2024-05-06 NOTE — PROGRESS NOTES
Office Note     Name: Orestes Garcia Jr.    : 1940     MRN: [id number]     Chief Complaint  Earache (Pt complains of left ear ache. He is here with wife keo. )    Subjective     History of Present Illness:  Orestes Garcia Jr. is a 84 y.o. male who presents today for:    Left earache and fullness.  Has been unable to hear from  Thinks he may need to have it cleaned out today.    Review of Systems:   Review of Systems    Past Medical History:   Past Medical History:   Diagnosis Date    Anemia     Benign essential HTN     Carotid artery stenosis 2020    RIGHT CAROTID  40-60% STENOSIS ON DOPPLER RECORD SAYS H/O TIA BUT PT DENIES THIS??    CKD (chronic kidney disease), stage III     Colon polyp     LAST C QUESTIONABLE    Condition not found     COLOGUARD NEG 9-18    Dementia     Depression     Diverticular disease of colon     WITHOUT HEMORRHAGE    Diverticulosis     WITH LOWR GI BLEED APPROS AGE 49    Hearing loss     High risk medication use     IBS (irritable bowel syndrome)     WITH DIARRHEA    Mixed hyperlipidemia     Overweight (BMI 25.0-29.9)     Skin cancer     LEG    Transient cerebral ischemia 2019    POSSIBLE A/W FALL    Type 2 diabetes mellitus     WITH STAGE 3 CKD WITHOUT LONG TERM CURRENT USE OF INSULIN   WITH POTEINURIA       Past Surgical History:   Past Surgical History:   Procedure Laterality Date    CATARACT EXTRACTION      COLONOSCOPY      [facility]    INTERVENTIONAL RADIOLOGY PROCEDURE Bilateral 2022    Procedure: Carotid Cerebral Angiogram;  Surgeon: Stan Falk MD;  Location: [facility] INVASIVE LOCATION;  Service: Interventional Radiology;  Laterality: Bilateral;    SKIN BIOPSY Right 2016    LEG    TONSILLECTOMY  1950       Family History:   Family History   Problem Relation Age of Onset    Cancer Mother     Hypertension Mother     Pancreatic cancer Mother     Alzheimer's disease Father     Coronary artery disease Father 86    Diabetes Father     Hearing loss  Father        Social History:   Social History     Socioeconomic History    Marital status:    Tobacco Use    Smoking status: Never    Smokeless tobacco: Never   Vaping Use    Vaping status: Never Used   Substance and Sexual Activity    Alcohol use: Never    Drug use: Never    Sexual activity: Defer       Immunizations:   Immunization History   Administered Date(s) Administered    COVID-19 (MODERNA) 1st,2nd,3rd Dose Monovalent 02/24/2021, 03/24/2021, 10/29/2021    COVID-19 (MODERNA) BIVALENT 12+YRS 10/04/2022    Influenza, Unspecified 10/07/2014, 10/03/2017    Pneumococcal Conjugate 13-Valent (PCV13) 08/11/2016, 08/29/2017    Pneumococcal Conjugate 20-Valent (PCV20) 02/07/2023    Pneumococcal, Unspecified 03/28/2007, 05/11/2012        Medications:     Current Outpatient Medications:     aspirin 81 MG EC tablet, Take 1 tablet by mouth Daily. occasionally, Disp: , Rfl:     atorvastatin (LIPITOR) 20 MG tablet, Take 1 tablet by mouth Every Night., Disp: 90 tablet, Rfl: 3    busPIRone (BUSPAR) 5 MG tablet, Take 1 tablet by mouth 2 (Two) Times a Day. FOR ANXIETY, Disp: 180 tablet, Rfl: 3    Cholecalciferol (Vitamin D3) 25 MCG (1000 UT) capsule, Take 1 capsule by mouth Daily., Disp: , Rfl:     citalopram (CeleXA) 40 MG tablet, Take 1 tablet by mouth Daily., Disp: 90 tablet, Rfl: 3    clopidogrel (PLAVIX) 75 MG tablet, Take 1 tablet by mouth Daily., Disp: 90 tablet, Rfl: 3    donepezil (ARICEPT) 10 MG tablet, Take 1 tablet by mouth Every Evening., Disp: 90 tablet, Rfl: 3    glucose blood test strip, 1 each by Other route As Needed (to test blood glucose for DM e11.65). Check fsbs qd, Disp: 100 each, Rfl: 0    lisinopril (PRINIVIL,ZESTRIL) 5 MG tablet, Take 1 tablet by mouth Daily., Disp: 90 tablet, Rfl: 3    metFORMIN ER (GLUCOPHAGE-XR) 500 MG 24 hr tablet, Take 1 tablet by mouth Daily for 346 days., Disp: 90 tablet, Rfl: 3    traZODone (DESYREL) 50 MG tablet, Take 1-2 pills po 30-60 min before bedtime to help with  "nerves and sleep, Disp: 90 tablet, Rfl: 2    Allergies:   No Known Allergies    Objective     Vital Signs  /78   Pulse (!) 49   Temp 98.5 °F (36.9 °C)   Wt 88.4 kg (194 lb 12.8 oz)   SpO2 97%   BMI 27.95 kg/m²   Estimated body mass index is 27.95 kg/m² as calculated from the following:    Height as of 5/3/24: 177.8 cm (70\").    Weight as of this encounter: 88.4 kg (194 lb 12.8 oz).            Physical Exam  Vitals and nursing note reviewed.   Constitutional:       Appearance: Normal appearance.   HENT:      Head:      Comments: Lateral cerumen impactions left greater than right.  Cardiovascular:      Rate and Rhythm: Normal rate and regular rhythm.      Heart sounds: No murmur heard.     No friction rub. No gallop.   Pulmonary:      Effort: Pulmonary effort is normal.      Breath sounds: Normal breath sounds. No wheezing, rhonchi or rales.   Neurological:      Mental Status: He is alert.            Ear Cerumen Removal    Date/Time: 5/20/2024 2:13 PM    Performed by: Elizabeth Lozano MD  Authorized by: Elizabeth Lozano MD    Anesthesia:  Local Anesthetic: none  Ceruminolytics applied: Ceruminolytics applied prior to the procedure.  Location details: left ear and right ear  Patient tolerance: patient tolerated the procedure well with no immediate complications  Procedure type: instrumentation, irrigation   Sedation:  Patient sedated: no             Assessment and Plan     Diagnoses:    ICD-10-CM ICD-9-CM   1. Bilateral impacted cerumen  H61.23 380.4   2. Non-recurrent acute suppurative otitis media of left ear without spontaneous rupture of tympanic membrane  H66.002 382.00       Plan:  1. Bilateral impacted cerumen  Plan attempted to manually disimpact using lighted curette but was unable to dislodge any cerumen therefore we changed to and irrigation on the left ear which was successful.  Patient did have some erythema and effusion behind the left TM afterwards Caplyta cerumen impaction was cleared without " difficulty.  - Ear Cerumen Removal    2. Non-recurrent acute suppurative otitis media of left ear without spontaneous rupture of tympanic membrane    - amoxicillin (AMOXIL) 875 MG tablet; Take 1 tablet by mouth 2 (Two) Times a Day for 7 days.  Dispense: 14 tablet; Refill: 0       Follow Up  Return if symptoms worsen or fail to improve.    Patient was advised to call the office or seek medical care if  any issues discussed during this visit worsen or persist or if new concerns arise        Elizabeth Lozano MD  [facility] PRIMARY CARE  [address] [place] [id number] [phone]

## 2024-05-20 PROCEDURE — 69210 REMOVE IMPACTED EAR WAX UNI: CPT | Performed by: INTERNAL MEDICINE

## 2024-05-20 RX ORDER — BUSPIRONE HYDROCHLORIDE 5 MG/1
5 TABLET ORAL 2 TIMES DAILY
Qty: 180 TABLET | Refills: 3 | OUTPATIENT
Start: 2024-05-20

## 2024-05-20 NOTE — TELEPHONE ENCOUNTER
Caller: Kim Garcia    Relationship: Emergency Contact    Best call back number: [phone]     Requested Prescriptions:   Requested Prescriptions     Pending Prescriptions Disp Refills    busPIRone (BUSPAR) 5 MG tablet 180 tablet 3     Sig: Take 1 tablet by mouth 2 (Two) Times a Day. FOR ANXIETY        Pharmacy where request should be sent: [facility] [address] - [phone] [facility] [phone]      Last office visit with prescribing clinician: 4/1/2024   Last telemedicine visit with prescribing clinician: Visit date not found   Next office visit with prescribing clinician: Visit date not found     Additional details provided by patient: PATIENT HAS TWO PILLS LEFT    Does the patient have less than a 3 day supply:  [x] Yes  [] No    Would you like a call back once the refill request has been completed: [x] Yes [] No    If the office needs to give you a call back, can they leave a voicemail: [] Yes [x] No    René Andrews Rep   05/20/24 09:27 EDT

## 2024-09-13 RX ORDER — CITALOPRAM HYDROBROMIDE 40 MG/1
40 TABLET ORAL DAILY
Qty: 90 TABLET | Refills: 3 | OUTPATIENT
Start: 2024-09-13

## 2024-09-13 RX ORDER — DONEPEZIL HYDROCHLORIDE 10 MG/1
10 TABLET, FILM COATED ORAL EVERY EVENING
Qty: 90 TABLET | Refills: 3 | OUTPATIENT
Start: 2024-09-13

## 2024-09-13 NOTE — TELEPHONE ENCOUNTER
Caller: Kim Garcia    Relationship: Emergency Contact    Best call back number: [phone]    Requested Prescriptions:   Requested Prescriptions     Pending Prescriptions Disp Refills    donepezil (ARICEPT) 10 MG tablet 90 tablet 3     Sig: Take 1 tablet by mouth Every Evening.    citalopram (CeleXA) 40 MG tablet 90 tablet 3     Sig: Take 1 tablet by mouth Daily.        Pharmacy where request should be sent: [facility] [address] [phone] [facility] [phone] [facility]     Last office visit with prescribing clinician: 4/1/2024   Last telemedicine visit with prescribing clinician: Visit date not found   Next office visit with prescribing clinician: Visit date not found       Does the patient have less than a 3 day supply:  [] Yes  [x] No    Would you like a call back once the refill request has been completed: [] Yes [x] No    If the office needs to give you a call back, can they leave a voicemail: [] Yes [x] No    René Cerda Rep   09/13/24 12:27 EDT

## 2024-09-16 RX ORDER — CITALOPRAM HYDROBROMIDE 40 MG/1
40 TABLET ORAL DAILY
Qty: 90 TABLET | Refills: 2 | OUTPATIENT
Start: 2024-09-16

## 2024-09-17 ENCOUNTER — HOSPITAL ENCOUNTER (INPATIENT)
Facility: HOSPITAL | Age: 84
LOS: 6 days | Discharge: NURSING, REHAB OR LONG-TERM CARE | End: 2024-09-25
Attending: EMERGENCY MEDICINE | Admitting: STUDENT IN AN ORGANIZED HEALTH CARE EDUCATION/TRAINING PROGRAM
Payer: MEDICARE

## 2024-09-17 ENCOUNTER — APPOINTMENT (OUTPATIENT)
Dept: CT IMAGING | Facility: HOSPITAL | Age: 84
End: 2024-09-17
Payer: MEDICARE

## 2024-09-17 ENCOUNTER — APPOINTMENT (OUTPATIENT)
Dept: GENERAL RADIOLOGY | Facility: HOSPITAL | Age: 84
End: 2024-09-17
Payer: MEDICARE

## 2024-09-17 DIAGNOSIS — R41.0 ACUTE CONFUSION: ICD-10-CM

## 2024-09-17 DIAGNOSIS — R11.2 NAUSEA AND VOMITING IN ADULT: Primary | ICD-10-CM

## 2024-09-17 DIAGNOSIS — R50.9 FEVER, UNSPECIFIED FEVER CAUSE: ICD-10-CM

## 2024-09-17 DIAGNOSIS — R53.1 WEAKNESS: ICD-10-CM

## 2024-09-17 LAB
ALBUMIN SERPL-MCNC: 3.6 G/DL (ref 3.5–5.2)
ALBUMIN/GLOB SERPL: 1.3 G/DL
ALP SERPL-CCNC: 85 U/L (ref 39–117)
ALT SERPL W P-5'-P-CCNC: 28 U/L (ref 1–41)
ANION GAP SERPL CALCULATED.3IONS-SCNC: 11 MMOL/L (ref 5–15)
AST SERPL-CCNC: 47 U/L (ref 1–40)
B PARAPERT DNA SPEC QL NAA+PROBE: NOT DETECTED
B PERT DNA SPEC QL NAA+PROBE: NOT DETECTED
BACTERIA UR QL AUTO: ABNORMAL /HPF
BASOPHILS # BLD AUTO: 0.02 10*3/MM3 (ref 0–0.2)
BASOPHILS NFR BLD AUTO: 0.2 % (ref 0–1.5)
BILIRUB SERPL-MCNC: 0.6 MG/DL (ref 0–1.2)
BILIRUB UR QL STRIP: NEGATIVE
BUN SERPL-MCNC: 23 MG/DL (ref 8–23)
BUN/CREAT SERPL: 16 (ref 7–25)
C PNEUM DNA NPH QL NAA+NON-PROBE: NOT DETECTED
CALCIUM SPEC-SCNC: 8.2 MG/DL (ref 8.6–10.5)
CHLORIDE SERPL-SCNC: 106 MMOL/L (ref 98–107)
CLARITY UR: ABNORMAL
CO2 SERPL-SCNC: 22 MMOL/L (ref 22–29)
COLOR UR: YELLOW
CREAT SERPL-MCNC: 1.44 MG/DL (ref 0.76–1.27)
D-LACTATE SERPL-SCNC: 2.4 MMOL/L (ref 0.5–2)
DEPRECATED RDW RBC AUTO: 43.6 FL (ref 37–54)
EGFRCR SERPLBLD CKD-EPI 2021: 47.3 ML/MIN/1.73
EOSINOPHIL # BLD AUTO: 0.07 10*3/MM3 (ref 0–0.4)
EOSINOPHIL NFR BLD AUTO: 0.7 % (ref 0.3–6.2)
ERYTHROCYTE [DISTWIDTH] IN BLOOD BY AUTOMATED COUNT: 12.8 % (ref 12.3–15.4)
FLUAV SUBTYP SPEC NAA+PROBE: NOT DETECTED
FLUBV RNA ISLT QL NAA+PROBE: NOT DETECTED
GLOBULIN UR ELPH-MCNC: 2.8 GM/DL
GLUCOSE SERPL-MCNC: 199 MG/DL (ref 65–99)
GLUCOSE UR STRIP-MCNC: ABNORMAL MG/DL
HADV DNA SPEC NAA+PROBE: NOT DETECTED
HCOV 229E RNA SPEC QL NAA+PROBE: NOT DETECTED
HCOV HKU1 RNA SPEC QL NAA+PROBE: NOT DETECTED
HCOV NL63 RNA SPEC QL NAA+PROBE: NOT DETECTED
HCOV OC43 RNA SPEC QL NAA+PROBE: NOT DETECTED
HCT VFR BLD AUTO: 40.4 % (ref 37.5–51)
HGB BLD-MCNC: 13.2 G/DL (ref 13–17.7)
HGB UR QL STRIP.AUTO: ABNORMAL
HMPV RNA NPH QL NAA+NON-PROBE: NOT DETECTED
HOLD SPECIMEN: NORMAL
HPIV1 RNA ISLT QL NAA+PROBE: NOT DETECTED
HPIV2 RNA SPEC QL NAA+PROBE: NOT DETECTED
HPIV3 RNA NPH QL NAA+PROBE: NOT DETECTED
HPIV4 P GENE NPH QL NAA+PROBE: NOT DETECTED
HYALINE CASTS UR QL AUTO: ABNORMAL /LPF
IMM GRANULOCYTES # BLD AUTO: 0.03 10*3/MM3 (ref 0–0.05)
IMM GRANULOCYTES NFR BLD AUTO: 0.3 % (ref 0–0.5)
KETONES UR QL STRIP: ABNORMAL
LEUKOCYTE ESTERASE UR QL STRIP.AUTO: NEGATIVE
LIPASE SERPL-CCNC: 108 U/L (ref 13–60)
LYMPHOCYTES # BLD AUTO: 0.53 10*3/MM3 (ref 0.7–3.1)
LYMPHOCYTES NFR BLD AUTO: 5.4 % (ref 19.6–45.3)
M PNEUMO IGG SER IA-ACNC: NOT DETECTED
MCH RBC QN AUTO: 30.2 PG (ref 26.6–33)
MCHC RBC AUTO-ENTMCNC: 32.7 G/DL (ref 31.5–35.7)
MCV RBC AUTO: 92.4 FL (ref 79–97)
MONOCYTES # BLD AUTO: 0.94 10*3/MM3 (ref 0.1–0.9)
MONOCYTES NFR BLD AUTO: 9.7 % (ref 5–12)
NEUTROPHILS NFR BLD AUTO: 8.14 10*3/MM3 (ref 1.7–7)
NEUTROPHILS NFR BLD AUTO: 83.7 % (ref 42.7–76)
NITRITE UR QL STRIP: NEGATIVE
NRBC BLD AUTO-RTO: 0 /100 WBC (ref 0–0.2)
PH UR STRIP.AUTO: 5.5 [PH] (ref 5–8)
PLATELET # BLD AUTO: 125 10*3/MM3 (ref 140–450)
PMV BLD AUTO: 11.2 FL (ref 6–12)
POTASSIUM SERPL-SCNC: 4.1 MMOL/L (ref 3.5–5.2)
PROT SERPL-MCNC: 6.4 G/DL (ref 6–8.5)
PROT UR QL STRIP: ABNORMAL
RBC # BLD AUTO: 4.37 10*6/MM3 (ref 4.14–5.8)
RBC # UR STRIP: ABNORMAL /HPF
REF LAB TEST METHOD: ABNORMAL
RHINOVIRUS RNA SPEC NAA+PROBE: NOT DETECTED
RSV RNA NPH QL NAA+NON-PROBE: NOT DETECTED
SARS-COV-2 RNA NPH QL NAA+NON-PROBE: NOT DETECTED
SODIUM SERPL-SCNC: 139 MMOL/L (ref 136–145)
SP GR UR STRIP: 1.02 (ref 1–1.03)
SQUAMOUS #/AREA URNS HPF: ABNORMAL /HPF
TROPONIN T SERPL HS-MCNC: 22 NG/L
UROBILINOGEN UR QL STRIP: ABNORMAL
WBC # UR STRIP: ABNORMAL /HPF
WBC NRBC COR # BLD AUTO: 9.73 10*3/MM3 (ref 3.4–10.8)
WHOLE BLOOD HOLD COAG: NORMAL
WHOLE BLOOD HOLD SPECIMEN: NORMAL

## 2024-09-17 PROCEDURE — 87493 C DIFF AMPLIFIED PROBE: CPT | Performed by: EMERGENCY MEDICINE

## 2024-09-17 PROCEDURE — 84484 ASSAY OF TROPONIN QUANT: CPT | Performed by: EMERGENCY MEDICINE

## 2024-09-17 PROCEDURE — 74176 CT ABD & PELVIS W/O CONTRAST: CPT

## 2024-09-17 PROCEDURE — 87186 SC STD MICRODIL/AGAR DIL: CPT | Performed by: EMERGENCY MEDICINE

## 2024-09-17 PROCEDURE — 85025 COMPLETE CBC W/AUTO DIFF WBC: CPT | Performed by: EMERGENCY MEDICINE

## 2024-09-17 PROCEDURE — 99285 EMERGENCY DEPT VISIT HI MDM: CPT

## 2024-09-17 PROCEDURE — 87154 CUL TYP ID BLD PTHGN 6+ TRGT: CPT | Performed by: EMERGENCY MEDICINE

## 2024-09-17 PROCEDURE — 70450 CT HEAD/BRAIN W/O DYE: CPT

## 2024-09-17 PROCEDURE — 80053 COMPREHEN METABOLIC PANEL: CPT | Performed by: EMERGENCY MEDICINE

## 2024-09-17 PROCEDURE — 87507 IADNA-DNA/RNA PROBE TQ 12-25: CPT | Performed by: EMERGENCY MEDICINE

## 2024-09-17 PROCEDURE — 81001 URINALYSIS AUTO W/SCOPE: CPT | Performed by: EMERGENCY MEDICINE

## 2024-09-17 PROCEDURE — 87040 BLOOD CULTURE FOR BACTERIA: CPT | Performed by: EMERGENCY MEDICINE

## 2024-09-17 PROCEDURE — 87147 CULTURE TYPE IMMUNOLOGIC: CPT | Performed by: EMERGENCY MEDICINE

## 2024-09-17 PROCEDURE — 36415 COLL VENOUS BLD VENIPUNCTURE: CPT

## 2024-09-17 PROCEDURE — 83605 ASSAY OF LACTIC ACID: CPT | Performed by: EMERGENCY MEDICINE

## 2024-09-17 PROCEDURE — 93005 ELECTROCARDIOGRAM TRACING: CPT | Performed by: EMERGENCY MEDICINE

## 2024-09-17 PROCEDURE — 71045 X-RAY EXAM CHEST 1 VIEW: CPT

## 2024-09-17 PROCEDURE — 0202U NFCT DS 22 TRGT SARS-COV-2: CPT | Performed by: EMERGENCY MEDICINE

## 2024-09-17 PROCEDURE — 83690 ASSAY OF LIPASE: CPT | Performed by: EMERGENCY MEDICINE

## 2024-09-17 RX ORDER — SODIUM CHLORIDE 9 MG/ML
10 INJECTION, SOLUTION INTRAMUSCULAR; INTRAVENOUS; SUBCUTANEOUS AS NEEDED
Status: DISCONTINUED | OUTPATIENT
Start: 2024-09-17 | End: 2024-09-25 | Stop reason: HOSPADM

## 2024-09-17 RX ORDER — ACETAMINOPHEN 500 MG
1000 TABLET ORAL ONCE
Status: COMPLETED | OUTPATIENT
Start: 2024-09-17 | End: 2024-09-17

## 2024-09-17 RX ORDER — SODIUM CHLORIDE 0.9 % (FLUSH) 0.9 %
10 SYRINGE (ML) INJECTION AS NEEDED
Status: DISCONTINUED | OUTPATIENT
Start: 2024-09-17 | End: 2024-09-25 | Stop reason: HOSPADM

## 2024-09-17 RX ADMIN — ACETAMINOPHEN 1000 MG: 500 TABLET ORAL at 22:42

## 2024-09-17 NOTE — Clinical Note
Level of Care: Telemetry [5]   Diagnosis: Weakness [851216]   Admitting Physician: KEITH COWART [[id number]]   Attending Physician: KEITH COWART [[id number]]

## 2024-09-18 ENCOUNTER — TELEPHONE (OUTPATIENT)
Dept: FAMILY MEDICINE CLINIC | Facility: CLINIC | Age: 84
End: 2024-09-18
Payer: MEDICARE

## 2024-09-18 LAB
ADV 40+41 DNA STL QL NAA+NON-PROBE: NOT DETECTED
ANION GAP SERPL CALCULATED.3IONS-SCNC: 10 MMOL/L (ref 5–15)
ASTRO TYP 1-8 RNA STL QL NAA+NON-PROBE: NOT DETECTED
BASOPHILS # BLD AUTO: 0.02 10*3/MM3 (ref 0–0.2)
BASOPHILS NFR BLD AUTO: 0.3 % (ref 0–1.5)
BUN SERPL-MCNC: 20 MG/DL (ref 8–23)
BUN/CREAT SERPL: 14.8 (ref 7–25)
C CAYETANENSIS DNA STL QL NAA+NON-PROBE: NOT DETECTED
C COLI+JEJ+UPSA DNA STL QL NAA+NON-PROBE: NOT DETECTED
C DIFF TOX GENS STL QL NAA+PROBE: NOT DETECTED
CALCIUM SPEC-SCNC: 7.6 MG/DL (ref 8.6–10.5)
CHLORIDE SERPL-SCNC: 111 MMOL/L (ref 98–107)
CO2 SERPL-SCNC: 21 MMOL/L (ref 22–29)
CREAT SERPL-MCNC: 1.35 MG/DL (ref 0.76–1.27)
CRYPTOSP DNA STL QL NAA+NON-PROBE: NOT DETECTED
D-LACTATE SERPL-SCNC: 1.5 MMOL/L (ref 0.5–2)
DEPRECATED RDW RBC AUTO: 42.9 FL (ref 37–54)
E HISTOLYT DNA STL QL NAA+NON-PROBE: NOT DETECTED
EAEC PAA PLAS AGGR+AATA ST NAA+NON-PRB: NOT DETECTED
EC STX1+STX2 GENES STL QL NAA+NON-PROBE: NOT DETECTED
EGFRCR SERPLBLD CKD-EPI 2021: 51.1 ML/MIN/1.73
EOSINOPHIL # BLD AUTO: 0.06 10*3/MM3 (ref 0–0.4)
EOSINOPHIL NFR BLD AUTO: 0.8 % (ref 0.3–6.2)
EPEC EAE GENE STL QL NAA+NON-PROBE: NOT DETECTED
ERYTHROCYTE [DISTWIDTH] IN BLOOD BY AUTOMATED COUNT: 12.8 % (ref 12.3–15.4)
ETEC LTA+ST1A+ST1B TOX ST NAA+NON-PROBE: NOT DETECTED
G LAMBLIA DNA STL QL NAA+NON-PROBE: NOT DETECTED
GLUCOSE BLDC GLUCOMTR-MCNC: 124 MG/DL (ref 70–130)
GLUCOSE BLDC GLUCOMTR-MCNC: 130 MG/DL (ref 70–130)
GLUCOSE BLDC GLUCOMTR-MCNC: 150 MG/DL (ref 70–130)
GLUCOSE BLDC GLUCOMTR-MCNC: 154 MG/DL (ref 70–130)
GLUCOSE SERPL-MCNC: 130 MG/DL (ref 65–99)
HBA1C MFR BLD: 7.5 % (ref 4.8–5.6)
HCT VFR BLD AUTO: 37.8 % (ref 37.5–51)
HGB BLD-MCNC: 12.6 G/DL (ref 13–17.7)
IMM GRANULOCYTES # BLD AUTO: 0.04 10*3/MM3 (ref 0–0.05)
IMM GRANULOCYTES NFR BLD AUTO: 0.5 % (ref 0–0.5)
LYMPHOCYTES # BLD AUTO: 0.84 10*3/MM3 (ref 0.7–3.1)
LYMPHOCYTES NFR BLD AUTO: 11.1 % (ref 19.6–45.3)
MAGNESIUM SERPL-MCNC: 2 MG/DL (ref 1.6–2.4)
MCH RBC QN AUTO: 30.2 PG (ref 26.6–33)
MCHC RBC AUTO-ENTMCNC: 33.3 G/DL (ref 31.5–35.7)
MCV RBC AUTO: 90.6 FL (ref 79–97)
MONOCYTES # BLD AUTO: 1.07 10*3/MM3 (ref 0.1–0.9)
MONOCYTES NFR BLD AUTO: 14.2 % (ref 5–12)
NEUTROPHILS NFR BLD AUTO: 5.51 10*3/MM3 (ref 1.7–7)
NEUTROPHILS NFR BLD AUTO: 73.1 % (ref 42.7–76)
NOROVIRUS GI+II RNA STL QL NAA+NON-PROBE: DETECTED
NRBC BLD AUTO-RTO: 0 /100 WBC (ref 0–0.2)
P SHIGELLOIDES DNA STL QL NAA+NON-PROBE: NOT DETECTED
PLATELET # BLD AUTO: 123 10*3/MM3 (ref 140–450)
PMV BLD AUTO: 11 FL (ref 6–12)
POTASSIUM SERPL-SCNC: 3.9 MMOL/L (ref 3.5–5.2)
QT INTERVAL: 510 MS
QTC INTERVAL: 510 MS
RBC # BLD AUTO: 4.17 10*6/MM3 (ref 4.14–5.8)
RVA RNA STL QL NAA+NON-PROBE: NOT DETECTED
S ENT+BONG DNA STL QL NAA+NON-PROBE: NOT DETECTED
SAPO I+II+IV+V RNA STL QL NAA+NON-PROBE: NOT DETECTED
SHIGELLA SP+EIEC IPAH ST NAA+NON-PROBE: NOT DETECTED
SODIUM SERPL-SCNC: 142 MMOL/L (ref 136–145)
V CHOL+PARA+VUL DNA STL QL NAA+NON-PROBE: NOT DETECTED
V CHOLERAE DNA STL QL NAA+NON-PROBE: NOT DETECTED
WBC NRBC COR # BLD AUTO: 7.54 10*3/MM3 (ref 3.4–10.8)
Y ENTEROCOL DNA STL QL NAA+NON-PROBE: NOT DETECTED

## 2024-09-18 PROCEDURE — G0378 HOSPITAL OBSERVATION PER HR: HCPCS

## 2024-09-18 PROCEDURE — 63710000001 INSULIN LISPRO (HUMAN) PER 5 UNITS: Performed by: NURSE PRACTITIONER

## 2024-09-18 PROCEDURE — 93010 ELECTROCARDIOGRAM REPORT: CPT | Performed by: INTERNAL MEDICINE

## 2024-09-18 PROCEDURE — 93005 ELECTROCARDIOGRAM TRACING: CPT | Performed by: NURSE PRACTITIONER

## 2024-09-18 PROCEDURE — 99223 1ST HOSP IP/OBS HIGH 75: CPT | Performed by: NURSE PRACTITIONER

## 2024-09-18 PROCEDURE — 83036 HEMOGLOBIN GLYCOSYLATED A1C: CPT | Performed by: NURSE PRACTITIONER

## 2024-09-18 PROCEDURE — 97165 OT EVAL LOW COMPLEX 30 MIN: CPT

## 2024-09-18 PROCEDURE — 85025 COMPLETE CBC W/AUTO DIFF WBC: CPT | Performed by: NURSE PRACTITIONER

## 2024-09-18 PROCEDURE — 25810000003 SODIUM CHLORIDE 0.9 % SOLUTION: Performed by: EMERGENCY MEDICINE

## 2024-09-18 PROCEDURE — 83605 ASSAY OF LACTIC ACID: CPT | Performed by: EMERGENCY MEDICINE

## 2024-09-18 PROCEDURE — 25810000003 LACTATED RINGERS PER 1000 ML: Performed by: NURSE PRACTITIONER

## 2024-09-18 PROCEDURE — 80048 BASIC METABOLIC PNL TOTAL CA: CPT | Performed by: NURSE PRACTITIONER

## 2024-09-18 PROCEDURE — 97161 PT EVAL LOW COMPLEX 20 MIN: CPT

## 2024-09-18 PROCEDURE — 83735 ASSAY OF MAGNESIUM: CPT | Performed by: NURSE PRACTITIONER

## 2024-09-18 PROCEDURE — 82948 REAGENT STRIP/BLOOD GLUCOSE: CPT

## 2024-09-18 RX ORDER — CLOPIDOGREL BISULFATE 75 MG/1
75 TABLET ORAL DAILY
Status: DISCONTINUED | OUTPATIENT
Start: 2024-09-18 | End: 2024-09-25 | Stop reason: HOSPADM

## 2024-09-18 RX ORDER — NICOTINE POLACRILEX 4 MG
15 LOZENGE BUCCAL
Status: DISCONTINUED | OUTPATIENT
Start: 2024-09-18 | End: 2024-09-25 | Stop reason: HOSPADM

## 2024-09-18 RX ORDER — ACETAMINOPHEN 325 MG/1
650 TABLET ORAL EVERY 4 HOURS PRN
Status: DISCONTINUED | OUTPATIENT
Start: 2024-09-18 | End: 2024-09-25 | Stop reason: HOSPADM

## 2024-09-18 RX ORDER — DEXTROSE MONOHYDRATE 25 G/50ML
25 INJECTION, SOLUTION INTRAVENOUS
Status: DISCONTINUED | OUTPATIENT
Start: 2024-09-18 | End: 2024-09-25 | Stop reason: HOSPADM

## 2024-09-18 RX ORDER — ACETAMINOPHEN 160 MG/5ML
650 SOLUTION ORAL EVERY 4 HOURS PRN
Status: DISCONTINUED | OUTPATIENT
Start: 2024-09-18 | End: 2024-09-25 | Stop reason: HOSPADM

## 2024-09-18 RX ORDER — CITALOPRAM HYDROBROMIDE 20 MG/1
20 TABLET ORAL DAILY
Status: DISCONTINUED | OUTPATIENT
Start: 2024-09-19 | End: 2024-09-25 | Stop reason: HOSPADM

## 2024-09-18 RX ORDER — DONEPEZIL HYDROCHLORIDE 10 MG/1
10 TABLET, FILM COATED ORAL EVERY EVENING
Status: DISCONTINUED | OUTPATIENT
Start: 2024-09-18 | End: 2024-09-25 | Stop reason: HOSPADM

## 2024-09-18 RX ORDER — ACETAMINOPHEN 650 MG/1
650 SUPPOSITORY RECTAL EVERY 4 HOURS PRN
Status: DISCONTINUED | OUTPATIENT
Start: 2024-09-18 | End: 2024-09-25 | Stop reason: HOSPADM

## 2024-09-18 RX ORDER — INSULIN LISPRO 100 [IU]/ML
2-7 INJECTION, SOLUTION INTRAVENOUS; SUBCUTANEOUS
Status: DISCONTINUED | OUTPATIENT
Start: 2024-09-18 | End: 2024-09-25 | Stop reason: HOSPADM

## 2024-09-18 RX ORDER — BUSPIRONE HYDROCHLORIDE 10 MG/1
5 TABLET ORAL 2 TIMES DAILY
Status: DISCONTINUED | OUTPATIENT
Start: 2024-09-18 | End: 2024-09-25 | Stop reason: HOSPADM

## 2024-09-18 RX ORDER — LISINOPRIL 5 MG/1
5 TABLET ORAL DAILY
Status: DISCONTINUED | OUTPATIENT
Start: 2024-09-18 | End: 2024-09-23

## 2024-09-18 RX ORDER — SODIUM CHLORIDE 0.9 % (FLUSH) 0.9 %
10 SYRINGE (ML) INJECTION EVERY 12 HOURS SCHEDULED
Status: DISCONTINUED | OUTPATIENT
Start: 2024-09-18 | End: 2024-09-25 | Stop reason: HOSPADM

## 2024-09-18 RX ORDER — TRAZODONE HYDROCHLORIDE 50 MG/1
50 TABLET, FILM COATED ORAL NIGHTLY PRN
Status: DISCONTINUED | OUTPATIENT
Start: 2024-09-18 | End: 2024-09-25 | Stop reason: HOSPADM

## 2024-09-18 RX ORDER — IBUPROFEN 600 MG/1
1 TABLET ORAL
Status: DISCONTINUED | OUTPATIENT
Start: 2024-09-18 | End: 2024-09-25 | Stop reason: HOSPADM

## 2024-09-18 RX ORDER — CITALOPRAM HYDROBROMIDE 20 MG/1
40 TABLET ORAL DAILY
Status: DISCONTINUED | OUTPATIENT
Start: 2024-09-18 | End: 2024-09-18

## 2024-09-18 RX ORDER — SODIUM CHLORIDE 9 MG/ML
40 INJECTION, SOLUTION INTRAVENOUS AS NEEDED
Status: DISCONTINUED | OUTPATIENT
Start: 2024-09-18 | End: 2024-09-25 | Stop reason: HOSPADM

## 2024-09-18 RX ORDER — ASPIRIN 81 MG/1
81 TABLET ORAL DAILY
Status: DISCONTINUED | OUTPATIENT
Start: 2024-09-18 | End: 2024-09-25 | Stop reason: HOSPADM

## 2024-09-18 RX ORDER — NITROGLYCERIN 0.4 MG/1
0.4 TABLET SUBLINGUAL
Status: DISCONTINUED | OUTPATIENT
Start: 2024-09-18 | End: 2024-09-25 | Stop reason: HOSPADM

## 2024-09-18 RX ORDER — SODIUM CHLORIDE, SODIUM LACTATE, POTASSIUM CHLORIDE, CALCIUM CHLORIDE 600; 310; 30; 20 MG/100ML; MG/100ML; MG/100ML; MG/100ML
75 INJECTION, SOLUTION INTRAVENOUS CONTINUOUS
Status: ACTIVE | OUTPATIENT
Start: 2024-09-18 | End: 2024-09-18

## 2024-09-18 RX ORDER — ATORVASTATIN CALCIUM 20 MG/1
20 TABLET, FILM COATED ORAL NIGHTLY
Status: DISCONTINUED | OUTPATIENT
Start: 2024-09-18 | End: 2024-09-25 | Stop reason: HOSPADM

## 2024-09-18 RX ORDER — SODIUM CHLORIDE 0.9 % (FLUSH) 0.9 %
10 SYRINGE (ML) INJECTION AS NEEDED
Status: DISCONTINUED | OUTPATIENT
Start: 2024-09-18 | End: 2024-09-25 | Stop reason: HOSPADM

## 2024-09-18 RX ADMIN — LISINOPRIL 5 MG: 5 TABLET ORAL at 10:17

## 2024-09-18 RX ADMIN — BUSPIRONE HYDROCHLORIDE 5 MG: 10 TABLET ORAL at 21:27

## 2024-09-18 RX ADMIN — SODIUM CHLORIDE, POTASSIUM CHLORIDE, SODIUM LACTATE AND CALCIUM CHLORIDE 75 ML/HR: 600; 310; 30; 20 INJECTION, SOLUTION INTRAVENOUS at 03:16

## 2024-09-18 RX ADMIN — Medication 10 ML: at 22:09

## 2024-09-18 RX ADMIN — ATORVASTATIN CALCIUM 20 MG: 20 TABLET, FILM COATED ORAL at 21:28

## 2024-09-18 RX ADMIN — Medication 10 ML: at 10:18

## 2024-09-18 RX ADMIN — DONEPEZIL HYDROCHLORIDE 10 MG: 10 TABLET, FILM COATED ORAL at 22:10

## 2024-09-18 RX ADMIN — ASPIRIN 81 MG: 81 TABLET, COATED ORAL at 10:17

## 2024-09-18 RX ADMIN — INSULIN LISPRO 2 UNITS: 100 INJECTION, SOLUTION INTRAVENOUS; SUBCUTANEOUS at 22:10

## 2024-09-18 RX ADMIN — BUSPIRONE HYDROCHLORIDE 5 MG: 10 TABLET ORAL at 10:17

## 2024-09-18 RX ADMIN — TRAZODONE HYDROCHLORIDE 50 MG: 50 TABLET ORAL at 22:04

## 2024-09-18 RX ADMIN — CITALOPRAM HYDROBROMIDE 40 MG: 20 TABLET ORAL at 10:17

## 2024-09-18 RX ADMIN — SODIUM CHLORIDE 1000 ML: 9 INJECTION, SOLUTION INTRAVENOUS at 00:59

## 2024-09-18 RX ADMIN — DONEPEZIL HYDROCHLORIDE 10 MG: 10 TABLET, FILM COATED ORAL at 03:43

## 2024-09-18 RX ADMIN — CLOPIDOGREL BISULFATE 75 MG: 75 TABLET ORAL at 10:17

## 2024-09-18 RX ADMIN — INSULIN LISPRO 2 UNITS: 100 INJECTION, SOLUTION INTRAVENOUS; SUBCUTANEOUS at 13:07

## 2024-09-18 NOTE — PROGRESS NOTES
[facility] Medicine Services  ADMISSION FOLLOW-UP NOTE          Patient admitted after midnight, H&P by my partner performed earlier on today's date reviewed.  Interim findings, labs, and charting also reviewed.        The [facility] Hospital Problem List has been managed and updated to include any new diagnoses:  Active Hospital Problems    Diagnosis  POA    **Weakness [R53.1]  Yes    Dementia without behavioral disturbance [F03.90]  Yes    Hyperlipidemia LDL goal <70 [E78.5]  Yes    Carotid artery stenosis, asymptomatic, bilateral [I65.23]  Yes    Type 2 diabetes mellitus with stage 3a chronic kidney disease, without long-term current use of insulin [E11.22, N18.31]  Yes    Benign essential hypertension [I10]  Yes      Resolved Hospital Problems   No resolved problems to display.     84-year-old male history of type 2 diabetes, CKD stage III hypertension, hyperlipidemia, dementia carotid stenosis, recent admission at OSH for acute pancreatitis for 2 days, discharged home 9/17.  He presented to [facility] with generalized weakness  s/p fall.    SIRS  Recent acute pancreatitis  Possible gastritis  -Patient with fever Tmax 101.6 and lactic acidosis with Norovirus  -CT abd/pelvis is unremarkable, respiratory PCR with COVID is negative, C. difficile toxin is negative, GI PCR panel is +for Norovirus  -Continue supportive therapy, IV fluids, antiemetics    Well-controlled type 2 diabetes A1c 7.5%  -Continue SSI    Hypertension  -BP stable continue lisinopril    Bilateral carotid artery disease  Hyperlipidemia  -Continue statin, aspirin and Plavix  -Follows with Dr. Wilkerson    Dementia  -Continue Aricept    Generalized weakness  Fall  -Suspect overall deconditioning from recent hospitalization  -PT/OT to evaluate    Otherwise continue plan of Admission H&P    Expected Discharge   Expected Discharge Date: 9/20/2024; Expected Discharge Time:      Jonel Eduardo MD  09/18/24

## 2024-09-18 NOTE — ED NOTES
Orestes Garcia Jr.    Nursing Report ED to Floor:  Mental status: alert and oriented x3  Ambulatory status: x2/lift  Oxygen Therapy:  2L nasal canula mostly for sleep  Cardiac Rhythm: normal sinus  Admitted from: ed  Safety Concerns:  fall risk, hard of hearing  Social Issues: na  ED Room #:  10    ED Nurse Phone Extension - [phone] or may call [phone].      HPI:   Chief Complaint   Patient presents with    Vomiting    Nausea    Diarrhea       Past Medical History:  Past Medical History:   Diagnosis Date    Anemia     Benign essential HTN     Carotid artery stenosis 07/2020    RIGHT CAROTID  40-60% STENOSIS ON DOPPLER RECORD SAYS H/O TIA BUT PT DENIES THIS??    CKD (chronic kidney disease), stage III     Colon polyp 2012    LAST C QUESTIONABLE    Condition not found 2018    COLOGUARD NEG 9-18    Dementia     Depression     Diverticular disease of colon     WITHOUT HEMORRHAGE    Diverticulosis     WITH LOWR GI BLEED APPROS AGE 49    Hearing loss     High risk medication use     IBS (irritable bowel syndrome)     WITH DIARRHEA    Mixed hyperlipidemia     Overweight (BMI 25.0-29.9)     Skin cancer     LEG    Transient cerebral ischemia 2019    POSSIBLE A/W FALL    Type 2 diabetes mellitus     WITH STAGE 3 CKD WITHOUT LONG TERM CURRENT USE OF INSULIN   WITH POTEINURIA        Past Surgical History:  Past Surgical History:   Procedure Laterality Date    CATARACT EXTRACTION  2020    COLONOSCOPY  2012    [facility]    INTERVENTIONAL RADIOLOGY PROCEDURE Bilateral 8/31/2022    Procedure: Carotid Cerebral Angiogram;  Surgeon: Stan Falk MD;  Location: [facility] INVASIVE LOCATION;  Service: Interventional Radiology;  Laterality: Bilateral;    SKIN BIOPSY Right 2016    LEG    TONSILLECTOMY  1950        Admitting Doctor:   Saurabh Gray MD    Consulting Provider(s):  Consults       No orders found for last 30 day(s).             Admitting Diagnosis:   The primary encounter diagnosis was Nausea and vomiting in adult. Diagnoses of  Fever, unspecified fever cause, Weakness, and Acute confusion were also pertinent to this visit.    Most Recent Vitals:   Vitals:    09/18/24 0000 09/18/24 0030 09/18/24 0100 09/18/24 0130   BP: 135/65 144/71 137/74 151/71   Pulse:  64 60 63   Resp:       Temp:       TempSrc:       SpO2:  93% 94% 96%   Weight:       Height:           Active LDAs/IV Access:   Lines, Drains & Airways       Active LDAs       Name Placement date Placement time Site Days    Peripheral IV 09/17/24 2047 Anterior;Left Forearm 09/17/24 2047  Forearm  less than 1    Peripheral IV 09/17/24 2121 Anterior;Right Forearm 09/17/24 2121  Forearm  less than 1                    Labs (abnormal labs have a star):   Labs Reviewed   COMPREHENSIVE METABOLIC PANEL - Abnormal; Notable for the following components:       Result Value    Glucose 199 (*)     Creatinine 1.44 (*)     Calcium 8.2 (*)     AST (SGOT) 47 (*)     eGFR 47.3 (*)     All other components within normal limits    Narrative:     GFR Normal >60  Chronic Kidney Disease <60  Kidney Failure <15    The GFR formula is only valid for adults with stable renal function between ages 18 and 70.   LIPASE - Abnormal; Notable for the following components:    Lipase 108 (*)     All other components within normal limits   URINALYSIS W/ MICROSCOPIC IF INDICATED (NO CULTURE) - Abnormal; Notable for the following components:    Appearance, UA Cloudy (*)     Glucose,  mg/dL (2+) (*)     Ketones, UA Trace (*)     Blood, UA Trace (*)     Protein, UA 30 mg/dL (1+) (*)     All other components within normal limits   LACTIC ACID, PLASMA - Abnormal; Notable for the following components:    Lactate 2.4 (*)     All other components within normal limits   CBC WITH AUTO DIFFERENTIAL - Abnormal; Notable for the following components:    Platelets 125 (*)     Neutrophil % 83.7 (*)     Lymphocyte % 5.4 (*)     Neutrophils, Absolute 8.14 (*)     Lymphocytes, Absolute 0.53 (*)     Monocytes, Absolute 0.94 (*)     All  other components within normal limits   SINGLE HS TROPONIN T - Abnormal; Notable for the following components:    HS Troponin T 22 (*)     All other components within normal limits    Narrative:     High Sensitive Troponin T Reference Range:  <14.0 ng/L- Negative Female for AMI  <22.0 ng/L- Negative Male for AMI  >=14 - Abnormal Female indicating possible myocardial injury.  >=22 - Abnormal Male indicating possible myocardial injury.   Clinicians would have to utilize clinical acumen, EKG, Troponin, and serial changes to determine if it is an Acute Myocardial Infarction or myocardial injury due to an underlying chronic condition.        URINALYSIS, MICROSCOPIC ONLY - Abnormal; Notable for the following components:    RBC, UA 6-10 (*)     All other components within normal limits   RESPIRATORY PANEL PCR W/ COVID-19 (SARS-COV-2), NP SWAB IN UTM/VTP, 2 HR TAT - Normal    Narrative:     In the setting of a positive respiratory panel with a viral infection PLUS a negative procalcitonin without other underlying concern for bacterial infection, consider observing off antibiotics or discontinuation of antibiotics and continue supportive care. If the respiratory panel is positive for atypical bacterial infection (Bordetella pertussis, Chlamydophila pneumoniae, or Mycoplasma pneumoniae), consider antibiotic de-escalation to target atypical bacterial infection.   LACTIC ACID, REFLEX - Normal   COVID PRE-OP / PRE-PROCEDURE SCREENING ORDER (NO ISOLATION)    Narrative:     The following orders were created for panel order COVID PRE-OP / PRE-PROCEDURE SCREENING ORDER (NO ISOLATION) - Swab, Nasopharynx.  Procedure                               Abnormality         Status                     ---------                               -----------         ------                     Respiratory Panel PCR w/...[[id number]]  Normal              Final result                 Please view results for these tests on the individual orders.   BLOOD  CULTURE   BLOOD CULTURE   GASTROINTESTINAL PANEL, PCR (PREFERRED) DOES NOT INCLUDE CDIFF   CLOSTRIDIOIDES DIFFICILE TOXIN    Narrative:     The following orders were created for panel order Clostridioides difficile Toxin - Stool, Per Rectum.  Procedure                               Abnormality         Status                     ---------                               -----------         ------                     Clostridioides difficile...[[id number]]                      In process                   Please view results for these tests on the individual orders.   CLOSTRIDIOIDES DIFFICILE TOXIN, PCR   RAINBOW DRAW    Narrative:     The following orders were created for panel order [place] Draw.  Procedure                               Abnormality         Status                     ---------                               -----------         ------                     Green Top (Gel)[[id number]]                                  Final result               Lavender Top[[id number]]                                     Final result               Gold Top - SST[[id number]]                                   Final result               Flores Top[[id number]]                                         Final result               Light Blue Top[[id number]]                                   Final result                 Please view results for these tests on the individual orders.   CBC AND DIFFERENTIAL    Narrative:     The following orders were created for panel order CBC & Differential.  Procedure                               Abnormality         Status                     ---------                               -----------         ------                     CBC Auto Differential[[id number]]        Abnormal            Final result               Scan Slide[[id number]]                                                                    Please view results for these tests on the individual orders.   GREEN TOP   LAVENDER TOP   GOLD TOP - SST    GRAY TOP   LIGHT BLUE TOP       Meds Given in ED:   Medications   Sodium Chloride (PF) 0.9 % 10 mL (has no administration in time range)   sodium chloride 0.9 % flush 10 mL (has no administration in time range)   acetaminophen (TYLENOL) tablet 1,000 mg (1,000 mg Oral Given 9/17/24 2242)   sodium chloride 0.9 % bolus 1,000 mL (1,000 mL Intravenous New Bag 9/18/24 0059)           Last NIH score:                                                          Dysphagia screening results:  Patient Factors Component (Dysphagia:Stroke or Rule-out)  Best Eye Response: 4-->(E4) spontaneous (09/17/24 2056)  Best Motor Response: 6-->(M6) obeys commands (09/17/24 2056)  Best Verbal Response: 5-->(V5) oriented (09/17/24 2056)  [place] Coma Scale Score: 15 (09/17/24 2056)     [place] Coma Scale:  No data recorded     CIWA:        Restraint Type:            Isolation Status:  No active isolations

## 2024-09-18 NOTE — THERAPY EVALUATION
Patient Name: Orestes Garcia Jr.  : 1940    MRN: [id number]                              Today's Date: 2024       Admit Date: 2024    Visit Dx:     ICD-10-CM ICD-9-CM   1. Nausea and vomiting in adult  R11.2 787.01   2. Fever, unspecified fever cause  R50.9 780.60   3. Weakness  R53.1 780.79   4. Acute confusion  R41.0 293.0     Patient Active Problem List   Diagnosis    Carotid artery stenosis, asymptomatic, bilateral    Hyperlipidemia LDL goal <70    Type 2 diabetes mellitus with stage 3a chronic kidney disease, without long-term current use of insulin    Encounter for long-term (current) use of other medications    Benign essential hypertension    Irritable bowel syndrome with diarrhea    High risk medication use    Diverticular disease of colon    Dementia without behavioral disturbance    Major depressive disorder with single episode, in partial remission    Situational anxiety    Insomnia due to anxiety and fear    DEEP (generalized anxiety disorder)    Weakness     Past Medical History:   Diagnosis Date    Anemia     Benign essential HTN     Carotid artery stenosis 2020    RIGHT CAROTID  40-60% STENOSIS ON DOPPLER RECORD SAYS H/O TIA BUT PT DENIES THIS??    CKD (chronic kidney disease), stage III     Colon polyp 2012    LAST C QUESTIONABLE    Condition not found 2018    COLOGUARD NEG 9-18    Dementia     Depression     Diverticular disease of colon     WITHOUT HEMORRHAGE    Diverticulosis     WITH LOWR GI BLEED APPROS AGE 49    Hearing loss     High risk medication use     IBS (irritable bowel syndrome)     WITH DIARRHEA    Mixed hyperlipidemia     Overweight (BMI 25.0-29.9)     Skin cancer     LEG    Transient cerebral ischemia 2019    POSSIBLE A/W FALL    Type 2 diabetes mellitus     WITH STAGE 3 CKD WITHOUT LONG TERM CURRENT USE OF INSULIN   WITH POTEINURIA     Past Surgical History:   Procedure Laterality Date    CATARACT EXTRACTION      COLONOSCOPY  2012    [facility]    INTERVENTIONAL  RADIOLOGY PROCEDURE Bilateral 8/31/2022    Procedure: Carotid Cerebral Angiogram;  Surgeon: Stan Falk MD;  Location: [facility] INVASIVE LOCATION;  Service: Interventional Radiology;  Laterality: Bilateral;    SKIN BIOPSY Right 2016    LEG    TONSILLECTOMY  1950      General Information       Row Name 09/18/24 1100          OT Time and Intention    Document Type evaluation  -CS     Mode of Treatment occupational therapy  -CS       Row Name 09/18/24 1100          General Information    Patient Profile Reviewed yes  -CS     Prior Level of Function independent:;all household mobility;ADL's;driving  Pt is retired banker, 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  -CS     Existing Precautions/Restrictions fall;other (see comments)  [place]  -CS     Barriers to Rehab hearing deficit  -CS       Row Name 09/18/24 1100          Occupational Profile    Reason for Services/Referral (Occupational Profile) below baseline for ADLs  -CS       Row Name 09/18/24 1100          Living Environment    People in Home spouse  -CS       Row Name 09/18/24 1100          Home Main Entrance    Number of Stairs, Main Entrance none  -CS       Row Name 09/18/24 1100          Stairs Within Home, Primary    Number of Stairs, Within Home, Primary none  -CS       Row Name 09/18/24 1100          Cognition    Orientation Status (Cognition) oriented x 4  -CS       Row Name 09/18/24 1100          Safety Issues, Functional Mobility    Safety Issues Affecting Function (Mobility) insight into deficits/self-awareness;awareness of need for assistance;judgment;problem-solving;safety precaution awareness;safety precautions follow-through/compliance;sequencing abilities  -CS     Impairments Affecting Function (Mobility) balance;endurance/activity tolerance;shortness of breath;strength  -CS     Comment, Safety Issues/Impairments (Mobility) very [place], shuffled steps this date  -CS               User Key  (r) = Recorded By, (t) = Taken By, (c) = Cosigned By      Initials  Name Provider Type     Jhonatan Lee OT Occupational Therapist                     Mobility/ADL's       Row Name 09/18/24 1101          Transfers    Transfers bed-chair transfer  -     Comment, (Transfers) hand held assist, short shuffled steps this date  -       Row Name 09/18/24 1101          Bed-Chair Transfer    Bed-Chair [place] (Transfers) contact guard;verbal cues  -       Row Name 09/18/24 1101          Functional Mobility    Functional Mobility- Safety Issues step length decreased  -     Functional Mobility- Comment defer to PT for specifics, Koby for ADL related mobility  -CS     Patient was able to Ambulate yes  -       Row Name 09/18/24 1101          Activities of Daily Living    BADL Assessment/Intervention lower body dressing;grooming;feeding  -       Row Name 09/18/24 1101          Lower Body Dressing Assessment/Training    [place] Level (Lower Body Dressing) don;socks;pants/bottoms;minimum assist (75% patient effort)  -     Position (Lower Body Dressing) edge of bed sitting  -       Row Name 09/18/24 1101          Grooming Assessment/Training    [place] Level (Grooming) wash face, hands;set up  -CS     Position (Grooming) supported sitting  -       Row Name 09/18/24 1101          Self-Feeding Assessment/Training    [place] Level (Feeding) liquids to mouth;scoop food and bring to mouth;independent  -CS     Position (Self-Feeding) sitting up in bed  -               User Key  (r) = Recorded By, (t) = Taken By, (c) = Cosigned By      Initials Name Provider Type     Jhonatan Lee OT Occupational Therapist                   Obj/Interventions       Row Name 09/18/24 1104          Sensory Assessment (Somatosensory)    Sensory Assessment (Somatosensory) UE sensation intact;bilateral UE  -CS     Bilateral UE Sensory Assessment general sensation;light touch awareness;light touch localization;intact  -       Row Name 09/18/24 1104          Vision  Assessment/Intervention    Visual Impairment/Limitations WFL  -CS       Row Name 09/18/24 1104          Range of Motion Comprehensive    General Range of Motion bilateral lower extremity ROM WFL  -CS       Row Name 09/18/24 1104          Strength Comprehensive (MMT)    General Manual Muscle Testing (MMT) Assessment upper extremity strength deficits identified  -CS     Comment, General Manual Muscle Testing (MMT) Assessment BUE grossly 4/5, symmetrical, generalized weakness  -CS       Row Name 09/18/24 1104          Motor Skills    Motor Skills coordination;functional endurance  -CS     Coordination bilateral;upper extremity;finger to nose  -CS       Row Name 09/18/24 1104          Balance    Balance Assessment sitting static balance;sitting dynamic balance;standing static balance;standing dynamic balance  -CS     Static Sitting Balance standby assist  -CS     Dynamic Sitting Balance contact guard  -CS     Position, Sitting Balance unsupported;sitting edge of bed  -CS     Static Standing Balance contact guard  -CS     Dynamic Standing Balance contact guard  -CS     Position/Device Used, Standing Balance supported  -CS     Balance Interventions sitting;standing;sit to stand;occupation based/functional task  -CS               User Key  (r) = Recorded By, (t) = Taken By, (c) = Cosigned By      Initials Name Provider Type    CS Jhonatan Lee L, OT Occupational Therapist                   Goals/Plan       [facility] Name 09/18/24 1111          Grooming Goal 1 (OT)    Activity/Device (Grooming Goal 1, OT) grooming skills, all;hair care;oral care;wash face, hands  -CS     [place] (Grooming Goal 1, OT) supervision required  -     Time Frame (Grooming Goal 1, OT) long term goal (LTG);10 days  -     Progress/Outcome (Grooming Goal 1, OT) new goal  -       Row Name 09/18/24 1111          ROM Goal 1 (OT)    ROM Goal 1 (OT) Increase gross BUE strength 1/2 muscle grade to promote safety/Ind with ADLs and related transfers   -CS     Time Frame (ROM Goal 1, OT) long term goal (LTG)  -CS     Progress/Outcome (ROM Goal 1, OT) new goal  -CS       Row Name 09/18/24 1111          Therapy Assessment/Plan (OT)    Planned Therapy Interventions (OT) activity tolerance training;functional balance retraining;occupation/activity based interventions;ROM/therapeutic exercise;strengthening exercise;transfer/mobility retraining;patient/caregiver education/training;neuromuscular control/coordination retraining  -CS               User Key  (r) = Recorded By, (t) = Taken By, (c) = Cosigned By      Initials Name Provider Type    CS Jhonatan Lee OT Occupational Therapist                   Clinical Impression       Row Name 09/18/24 1108          Pain Assessment    Pretreatment Pain Rating 0/10 - no pain  -CS     Posttreatment Pain Rating 0/10 - no pain  -CS     Additional Documentation Pain Scale: FACES Pre/Post-Treatment (Group)  -CS       [facility] Name 09/18/24 1108          Pain Scale: FACES Pre/Post-Treatment    Pain: FACES Scale, Pretreatment 0-->no hurt  -CS     Posttreatment Pain Rating 0-->no hurt  -CS       Row Name 09/18/24 1108          Plan of Care Review    Plan of Care Reviewed With patient  -CS     Progress no change  -CS     Outcome Evaluation Baseline ADL completino limited by balance, strength, and functional endurance deficits warranting skilled IP OT services to promote return to PLOF. Short shuffled steps observed, poor dynamic standing balance. Rec d/c to IRF for best functional outcomes.  -CS       Row Name 09/18/24 1108          Therapy Assessment/Plan (OT)    Patient/Family Therapy Goal Statement (OT) return to Ind with ADLs  -CS     Rehab Potential (OT) good, to achieve stated therapy goals  -CS     Criteria for Skilled Therapeutic Interventions Met (OT) yes;skilled treatment is necessary;does not meet criteria for skilled intervention  -CS     Therapy Frequency (OT) daily  -CS       Row Name 09/18/24 1108          Therapy Plan  Review/Discharge Plan (OT)    Anticipated Discharge Disposition (OT) inpatient rehabilitation facility  -CS       Row Name 09/18/24 1108          Vital Signs    Pre Systolic BP Rehab 163  -CS     Pre Treatment Diastolic BP 78  -CS     Post Systolic BP Rehab 171  -CS     Post Treatment Diastolic BP 79  -CS     O2 Delivery Pre Treatment nasal cannula  -CS     O2 Delivery Intra Treatment room air  -CS     O2 Delivery Post Treatment room air  -CS     Pre Patient Position Supine  -CS     Intra Patient Position Standing  -CS     Post Patient Position Sitting  -CS       Row Name 09/18/24 1108          Positioning and Restraints    Pre-Treatment Position in bed  -CS     Post Treatment Position chair  -CS               User Key  (r) = Recorded By, (t) = Taken By, (c) = Cosigned By      Initials Name Provider Type    CS Jhonatan Lee, OT Occupational Therapist                   Outcome Measures       Row Name 09/18/24 1112          How much help from another is currently needed...    Putting on and taking off regular lower body clothing? 2  -CS     Bathing (including washing, rinsing, and drying) 2  -CS     Toileting (which includes using toilet bed pan or urinal) 3  -CS     Putting on and taking off regular upper body clothing 3  -CS     Taking care of personal grooming (such as brushing teeth) 3  -CS     Eating meals 4  -CS     AM-PAC 6 Clicks Score (OT) 17  -CS       Row Name 09/18/24 1045 09/18/24 0255       How much help from another person do you currently need...    Turning from your back to your side while in flat bed without using bedrails? 3  -CK 2  -KH    Moving from lying on back to sitting on the side of a flat bed without bedrails? 3  -CK 2  -KH    Moving to and from a bed to a chair (including a wheelchair)? 3  -CK 2  -KH    Standing up from a chair using your arms (e.g., wheelchair, bedside chair)? 3  -CK 2  -KH    Climbing 3-5 steps with a railing? 2  -CK 2  -KH    To walk in hospital room? 3  -CK 2  -KH     AM-PAC 6 Clicks Score (PT) 17  -CK 12  -KH    Highest Level of Mobility Goal 5 --> Static standing  -CK 4 --> Transfer to chair/commode  -KH      Row Name 09/18/24 1112 09/18/24 1045       Functional Assessment    Outcome Measure Options AM-PAC 6 Clicks Basic Mobility (PT);AM-PAC 6 Clicks Daily Activity (OT)  -CS AM-PAC 6 Clicks Basic Mobility (PT)  -CK              User Key  (r) = Recorded By, (t) = Taken By, (c) = Cosigned By      Initials Name Provider Type    CS Jhonatan Lee, OT Occupational Therapist    Verona Mooney, PT Physical Therapist    Lety Obregon, RN Registered Nurse                    Occupational Therapy Education       Title: PT OT SLP Therapies (In Progress)       Topic: Occupational Therapy (In Progress)       Point: ADL training (In Progress)       Description:   Instruct learner(s) on proper safety adaptation and remediation techniques during self care or transfers.   Instruct in proper use of assistive devices.                  Learning Progress Summary             Patient Acceptance, E,D, NR by CS at 9/18/2024 1113                         Point: Home exercise program (Not Started)       Description:   Instruct learner(s) on appropriate technique for monitoring, assisting and/or progressing therapeutic exercises/activities.                  Learner Progress:  Not documented in this visit.              Point: Precautions (In Progress)       Description:   Instruct learner(s) on prescribed precautions during self-care and functional transfers.                  Learning Progress Summary             Patient Acceptance, E,D, NR by CS at 9/18/2024 1113                         Point: Body mechanics (In Progress)       Description:   Instruct learner(s) on proper positioning and spine alignment during self-care, functional mobility activities and/or exercises.                  Learning Progress Summary             Patient Acceptance, E,D, NR by CS at 9/18/2024 1113                                          User Key       Initials Effective Dates Name Provider Type Discipline    CS 06/16/21 -  Jhonatan Lee OT Occupational Therapist OT                  OT Recommendation and Plan  Planned Therapy Interventions (OT): activity tolerance training, functional balance retraining, occupation/activity based interventions, ROM/therapeutic exercise, strengthening exercise, transfer/mobility retraining, patient/caregiver education/training, neuromuscular control/coordination retraining  Therapy Frequency (OT): daily  Plan of Care Review  Plan of Care Reviewed With: patient  Progress: no change  Outcome Evaluation: Baseline ADL completino limited by balance, strength, and functional endurance deficits warranting skilled IP OT services to promote return to PLOF. Short shuffled steps observed, poor dynamic standing balance. Rec d/c to IRF for best functional outcomes.     Time Calculation:         Time Calculation- OT       Row Name 09/18/24 1116             Time Calculation- OT    OT Start Time 0928  -CS      OT Received On 09/18/24  -CS      OT Goal Re-Cert Due Date 09/28/24  -CS         Untimed Charges    OT Eval/Re-eval Minutes 51  -CS         Total Minutes    Untimed Charges Total Minutes 51  -CS       Total Minutes 51  -CS                User Key  (r) = Recorded By, (t) = Taken By, (c) = Cosigned By      Initials Name Provider Type    CS Jhonatan Lee, OT Occupational Therapist                  Therapy Charges for Today       Code Description Service Date Service Provider Modifiers Qty    [id number] HC OT EVAL LOW COMPLEXITY 4 9/18/2024 Jhonatan Lee OT GO 1                 Jhonatan Lee OT  9/18/2024

## 2024-09-18 NOTE — CASE MANAGEMENT/SOCIAL WORK
Discharge Planning Assessment  [facility]     Patient Name: Orestes Garcia Jr.  MRN: [id number]  Today's Date: 9/18/2024    Admit Date: 9/17/2024    Plan: Home   Discharge Needs Assessment       Row Name 09/18/24 1007       Living Environment    People in Home spouse    Current Living Arrangements home    Potentially Unsafe Housing Conditions none    Primary Care Provided by self    Provides Primary Care For no one    Family Caregiver if Needed spouse    Quality of Family Relationships unable to assess    Able to Return to Prior Arrangements yes       Resource/Environmental Concerns    Resource/Environmental Concerns none       Transition Planning    Patient/Family Anticipates Transition to home with help/services    Patient/Family Anticipated Services at Transition ;rehabilitation services    Transportation Anticipated family or friend will provide       Discharge Needs Assessment    Readmission Within the Last 30 Days no previous admission in last 30 days    Equipment Currently Used at Home none    Concerns to be Addressed discharge planning    Anticipated Changes Related to Illness none                   Discharge Plan       Row Name 09/18/24 1008       Plan    Plan Home    Patient/Family in Agreement with Plan yes    Plan Comments Spoke with patient in room to initiate discharge planning.  He lives with his wife in [facility].  Prior to admission, he was independent with ADL's.  He has no DME at home and is not current with home health.  His PCP is Jose Alberto Vang.  He does not have an advanced directive.  Verified that he has [place] [employer]placement.  Mr. Garcia has RX coverage and has scripts filledd at [facility].  His goal is to return home at discharge.  Will await therapy recommendatiosn to determine proper dsicharge placement.  CM will continue to follow.    Final Discharge Disposition Code 01 - home or self-care                  Continued Care and Services - Admitted Since  9/17/2024    No active coordination exists for this encounter.       Expected Discharge Date and Time       Expected Discharge Date Expected Discharge Time    Sep 20, 2024            Demographic Summary       Row Name 09/18/24 1007       General Information    Admission Type observation    Arrived From emergency department    Referral Source admission list    Reason for Consult discharge planning    Preferred Language English                   Functional Status       Row Name 09/18/24 1007       Functional Status    Usual Activity Tolerance fair    Current Activity Tolerance fair       Functional Status, IADL    Medications independent    Meal Preparation independent    Housekeeping independent    Laundry independent    Shopping independent                   Psychosocial    No documentation.                  Abuse/Neglect    No documentation.                  Legal    No documentation.                  Substance Abuse    No documentation.                  Patient Forms    No documentation.                     Fatuma Smith, RN

## 2024-09-18 NOTE — ED PROVIDER NOTES
Subjective   History of Present Illness  84-year-old male brought in for evaluation of nausea vomiting confusion and worsening weakness.  The patient began to have nausea vomiting diarrhea roughly 3 days ago.  The patient presented to [place] was admitted for 2 days.  His overall oral intake was decreased while he was in the hospital.  He ultimately was discharged earlier the day at roughly 6 PM.  After he got back home he fell down the ground and was unable to get back up due to increased weakness.  Family does not feel he can take care of himself at home and therefore have brought him here to the ER for workup.  He continues to have mild diarrhea but it does seem to have improved.  He has a fever upon arrival here.  No chest pain cough or shortness of breath.  Ox saturations are normal.  No sick contacts.  No dysuria.  He has orientation to person and place but not fully at the time.  The granddaughter, who is a nurse, states that this is consistent with his baseline.  She states his overall mentation has been slowly declining for the last year.      Review of Systems   Constitutional:  Positive for activity change and appetite change. Negative for chills, fatigue and fever.   HENT:  Negative for congestion, ear pain, postnasal drip, sinus pressure and sore throat.    Eyes:  Negative for pain, redness and visual disturbance.   Respiratory:  Negative for cough, chest tightness and shortness of breath.    Cardiovascular:  Negative for chest pain, palpitations and leg swelling.   Gastrointestinal:  Positive for abdominal pain, diarrhea, nausea and vomiting. Negative for anal bleeding and blood in stool.   Endocrine: Negative for polydipsia and polyuria.   Genitourinary:  Negative for difficulty urinating, dysuria, frequency and urgency.   Musculoskeletal:  Negative for arthralgias, back pain and neck pain.   Skin:  Negative for pallor and rash.   Allergic/Immunologic: Negative for environmental allergies  abdominal pain and immunocompromised state.   Neurological:  Positive for weakness (Generalized). Negative for dizziness and headaches.   Hematological:  Negative for adenopathy.   Psychiatric/Behavioral:  Positive for confusion and decreased concentration. Negative for self-injury and suicidal ideas. The patient is not nervous/anxious.    All other systems reviewed and are negative.      Past Medical History:   Diagnosis Date    Anemia     Benign essential HTN     Carotid artery stenosis 07/2020    RIGHT CAROTID  40-60% STENOSIS ON DOPPLER RECORD SAYS H/O TIA BUT PT DENIES THIS??    CKD (chronic kidney disease), stage III     Colon polyp 2012    LAST C QUESTIONABLE    Condition not found 2018    COLOGUARD NEG 9-18    Dementia     Depression     Diverticular disease of colon     WITHOUT HEMORRHAGE    Diverticulosis     WITH LOWR GI BLEED APPROS AGE 49    Hearing loss     High risk medication use     IBS (irritable bowel syndrome)     WITH DIARRHEA    Mixed hyperlipidemia     Overweight (BMI 25.0-29.9)     Skin cancer     LEG    Transient cerebral ischemia 2019    POSSIBLE A/W FALL    Type 2 diabetes mellitus     WITH STAGE 3 CKD WITHOUT LONG TERM CURRENT USE OF INSULIN   WITH POTEINURIA       No Known Allergies    Past Surgical History:   Procedure Laterality Date    CATARACT EXTRACTION  2020    COLONOSCOPY  2012    [facility]    INTERVENTIONAL RADIOLOGY PROCEDURE Bilateral 8/31/2022    Procedure: Carotid Cerebral Angiogram;  Surgeon: Stan Falk MD;  Location: [facility] INVASIVE LOCATION;  Service: Interventional Radiology;  Laterality: Bilateral;    SKIN BIOPSY Right 2016    LEG    TONSILLECTOMY  1950       Family History   Problem Relation Age of Onset    Cancer Mother     Hypertension Mother     Pancreatic cancer Mother     Alzheimer's disease Father     Coronary artery disease Father 86    Diabetes Father     Hearing loss Father        Social History     Socioeconomic History    Marital status:    Tobacco Use     Smoking status: Never    Smokeless tobacco: Never   Vaping Use    Vaping status: Never Used   Substance and Sexual Activity    Alcohol use: Never    Drug use: Never    Sexual activity: Defer           Objective   Physical Exam  Vitals and nursing note reviewed.   Constitutional:       General: He is not in acute distress.     Appearance: Normal appearance. He is well-developed. He is ill-appearing. He is not toxic-appearing or diaphoretic.      Comments: Warm to touch   HENT:      Head: Normocephalic and atraumatic.      Right Ear: External ear normal.      Left Ear: External ear normal.      Nose: Nose normal.   Eyes:      General: Lids are normal.      Pupils: Pupils are equal, round, and reactive to light.   Neck:      Trachea: No tracheal deviation.   Cardiovascular:      Rate and Rhythm: Normal rate and regular rhythm.      Pulses: No decreased pulses.      Heart sounds: Normal heart sounds. No murmur heard.     No friction rub. No gallop.   Pulmonary:      Effort: Pulmonary effort is normal. No respiratory distress.      Breath sounds: Normal breath sounds. No decreased breath sounds, wheezing, rhonchi or rales.   Abdominal:      General: Bowel sounds are normal.      Palpations: Abdomen is soft.      Tenderness: There is generalized abdominal tenderness. There is no guarding or rebound.   Musculoskeletal:         General: No deformity. Normal range of motion.      Cervical back: Normal range of motion and neck supple.   Lymphadenopathy:      Cervical: No cervical adenopathy.   Skin:     General: Skin is warm and dry.      Findings: No rash.   Neurological:      Mental Status: He is alert. He is disoriented and confused.      GCS: GCS eye subscore is 4. GCS verbal subscore is 4. GCS motor subscore is 6.      Cranial Nerves: No cranial nerve deficit.      Sensory: No sensory deficit.   Psychiatric:         Speech: Speech normal.         Behavior: Behavior normal.         Thought Content: Thought content normal.          Judgment: Judgment normal.         Procedures           ED Course  ED Course as of 09/18/24 0035   e Sep 17, 2024   3079 The patient began to have symptoms of nausea vomiting and diarrhea roughly 3 days ago.  The patient was admitted at [facility] for 2 days.  The granddaughter who arrived later in the patient's course here in the ER states that he did not get anything the first day, yesterday he ate at roughly 3 PM, and that he was discharged from [facility] earlier today.  She is unsure if he ate anything the day.  He was discharged at roughly 6 PM.  He had been home for roughly 1 hour before he fell and could not get up because he was too weak to do so.  They brought the patient here for further evaluation.  The granddaughter reports that he has signs of dementia progressing over the last year but he has not been definitively diagnosed as dementia.  He was oriented to person and place but not fully oriented at the time.  He stated it was 1988.  He reports some generalized abdominal pain but the abdomen is soft on exam.  He had a low-grade fever here.  Treatable source of infection was not identified.  The patient does not have meningismus.  I feel he is too weak to go home and take care of himself would like to admit for hydration and potential rehab placement.   [NS]      ED Course User Index  [NS] Herlinda Quispe MD                                             Medical Decision Making  Differential diagnosis includes dehydration, urinary tract infection, pneumonia, viral illness, gastroenteritis, bowel obstruction, other unspecified etiology.    Chest x-ray, CT scan of the head without contrast, and CT scan of the abdomen pelvis without contrast showed no acute abnormalities.    Lipase is mildly elevated at 108.    White blood cell count is normal, slightly elevated creatinine to 1.44, no significant electrolyte abnormalities.    Viral respiratory panel is negative.  Urine  shows elevated glucose and trace ketones but no signs of infection.    The patient's fever was improved with Tylenol and the patient was given IV fluids here in the ER.    I discussed the patient with the hospitalist, Dr. Cowart, who will consult on the patient to determine status of admission.    Problems Addressed:  Acute confusion: complicated acute illness or injury with systemic symptoms  Fever, unspecified fever cause: complicated acute illness or injury with systemic symptoms  Nausea and vomiting in adult: complicated acute illness or injury with systemic symptoms  Weakness: complicated acute illness or injury with systemic symptoms    Amount and/or Complexity of Data Reviewed  External Data Reviewed: labs, radiology and ECG.  Labs: ordered. Decision-making details documented in ED Course.  Radiology: ordered and independent interpretation performed. Decision-making details documented in ED Course.  ECG/medicine tests: ordered and independent interpretation performed. Decision-making details documented in ED Course.     Details: EKG independently interpreted unsell shows sinus rhythm with first-degree AV block with no acute ischemic changes.  Prolonged QT at 510    Risk  OTC drugs.  Prescription drug management.  Decision regarding hospitalization.        Final diagnoses:   Nausea and vomiting in adult   Fever, unspecified fever cause   Weakness   Acute confusion       ED Disposition  ED Disposition       ED Disposition   Decision to Admit    Condition   --    Comment   Level of Care: Telemetry [5]   Diagnosis: Weakness [027480]   Admitting Physician: KEITH COWART [[id number]]   Attending Physician: KEITH COWART [[id number]]                 No follow-up provider specified.       Medication List      No changes were made to your prescriptions during this visit.            Herlinda Quispe MD  09/18/24 0035

## 2024-09-18 NOTE — PLAN OF CARE
Goal Outcome Evaluation:  Plan of Care Reviewed With: patient           Outcome Evaluation: Patient presents with deficits in balance, endurance, and strength with ability to ambulate 30' Isaías with BASILE HHA. He mobilizing below his baseline indicating IPPT intervention. Recommend D/C to IPR at this time.      Anticipated Discharge Disposition (PT): inpatient rehabilitation facility

## 2024-09-18 NOTE — H&P
[facility] Medicine Services  HISTORY AND PHYSICAL    Patient Name: Orestes Garcia Jr.  : 1940  MRN: [id number]  Primary Care Physician: Jose Alberto Vang MD  Date of admission: 2024    Subjective   Subjective     Chief Complaint:  Weakness, nausea, vomiting, diarrhea    HPI:  Orestes Garcia Jr. is a 84 y.o. male with a history of HTN, HLD, carotid artery disease, CKD, dementia, T2DM, presents to the ED with complaints of nausea, vomiting and diarrhea for the past 3 days.  He was admitted at [facility] for acute pancreatitis for 2 days and was discharged from there earlier today.  After being home for approximately 1 hour he fell and could not get up due to generalized weakness.  Granddaughter at bedside states that he has had a loss of appetite, increased confusion, and continues to have GI symptoms.  Two other family members now have similar symptoms.  No recent antibiotic use or contact with bad food.  No shortness of air, cough, chest pain, abdominal pain, or any other complaints at this time.  CT head shows no acute intracranial pathology.  CT abdomen pelvis shows no acute abdominal or pelvic pathology.  Chest x-ray shows enlarged cardiac silhouette with possible trace bilateral pleural effusions.  Patient was given a liter of saline in the ED, and is being admitted to the hospitalist for further evaluation management.          Review of Systems   Unable to perform ROS: Dementia                Personal History     Past Medical History:   Diagnosis Date   • Anemia    • Benign essential HTN    • Carotid artery stenosis 2020    RIGHT CAROTID  40-60% STENOSIS ON DOPPLER RECORD SAYS H/O TIA BUT PT DENIES THIS??   • CKD (chronic kidney disease), stage III    • Colon polyp     LAST C QUESTIONABLE   • Condition not found     COLOGUARD NEG    • Dementia    • Depression    • Diverticular disease of colon     WITHOUT HEMORRHAGE   • Diverticulosis     WITH  LOWR GI BLEED APPROS AGE 49   • Hearing loss    • High risk medication use    • IBS (irritable bowel syndrome)     WITH DIARRHEA   • Mixed hyperlipidemia    • Overweight (BMI 25.0-29.9)    • Skin cancer     LEG   • Transient cerebral ischemia 2019    POSSIBLE A/W FALL   • Type 2 diabetes mellitus     WITH STAGE 3 CKD WITHOUT LONG TERM CURRENT USE OF INSULIN   WITH POTEINURIA             Past Surgical History:   Procedure Laterality Date   • CATARACT EXTRACTION  2020   • COLONOSCOPY  2012    [facility]   • INTERVENTIONAL RADIOLOGY PROCEDURE Bilateral 8/31/2022    Procedure: Carotid Cerebral Angiogram;  Surgeon: Stan Falk MD;  Location: [facility] INVASIVE LOCATION;  Service: Interventional Radiology;  Laterality: Bilateral;   • SKIN BIOPSY Right 2016    LEG   • TONSILLECTOMY  1950       Family History:  family history includes Alzheimer's disease in his father; Cancer in his mother; Coronary artery disease (age of onset: 86) in his father; Diabetes in his father; Hearing loss in his father; Hypertension in his mother; Pancreatic cancer in his mother.     Social History:  reports that he has never smoked. He has never used smokeless tobacco. He reports that he does not drink alcohol and does not use drugs.  Social History     Social History Narrative    Lives at home       Medications:  Vitamin D3, aspirin, atorvastatin, busPIRone, citalopram, clopidogrel, donepezil, glucose blood, lisinopril, metFORMIN ER, and traZODone    No Known Allergies    Objective   Objective     Vital Signs:   Temp:  [99.1 °F (37.3 °C)-101.3 °F (38.5 °C)] 99.1 °F (37.3 °C)  Heart Rate:  [60-68] 63  Resp:  [18] 18  BP: (112-156)/(62-89) 112/89    Physical Exam   Constitutional: Awake, alert, resting in bed  Eyes: PERRLA, sclerae anicteric, no conjunctival injection  HENT: NCAT, mucous membranes moist  Neck: Supple, no thyromegaly, no lymphadenopathy, trachea midline  Respiratory: Clear to auscultation bilaterally, nonlabored respirations    Cardiovascular: RRR, no murmurs, rubs, or gallops, palpable pedal pulses bilaterally  Gastrointestinal: Positive bowel sounds, soft, nontender, nondistended  Musculoskeletal: No bilateral ankle edema, no clubbing or cyanosis to extremities  Psychiatric: Appropriate affect, cooperative  Neurologic: Oriented x 3, some confusion, strength symmetric in all extremities, Cranial Nerves grossly intact to confrontation, speech clear  Skin: No rashes       Result Review:  I have personally reviewed the results from the time of this admission to 9/18/2024 01:08 EDT and agree with these findings:  [x]  Laboratory list / accordion  [x]  Microbiology  [x]  Radiology  [x]  EKG/Telemetry   []  Cardiology/Vascular   []  Pathology  [x]  Old records  []  Other:  Most notable findings include:     LAB RESULTS:      Lab 09/18/24  0030 09/17/24 2121   WBC  --  9.73   HEMOGLOBIN  --  13.2   HEMATOCRIT  --  40.4   PLATELETS  --  125*   NEUTROS ABS  --  8.14*   IMMATURE GRANS (ABS)  --  0.03   LYMPHS ABS  --  0.53*   MONOS ABS  --  0.94*   EOS ABS  --  0.07   MCV  --  92.4   LACTATE 1.5 2.4*         Lab 09/17/24 2121   SODIUM 139   POTASSIUM 4.1   CHLORIDE 106   CO2 22.0   ANION GAP 11.0   BUN 23   CREATININE 1.44*   EGFR 47.3*   GLUCOSE 199*   CALCIUM 8.2*         Lab 09/17/24 2121   TOTAL PROTEIN 6.4   ALBUMIN 3.6   GLOBULIN 2.8   ALT (SGPT) 28   AST (SGOT) 47*   BILIRUBIN 0.6   ALK PHOS 85   LIPASE 108*         Lab 09/17/24 2146   HSTROP T 22*                 Brief Urine Lab Results  (Last result in the past 365 days)        Color   Clarity   Blood   Leuk Est   Nitrite   Protein   CREAT   Urine HCG        09/17/24 2132 Yellow   Cloudy   Trace   Negative   Negative   30 mg/dL (1+)                 Microbiology Results (last 10 days)       Procedure Component Value - Date/Time    COVID PRE-OP / PRE-PROCEDURE SCREENING ORDER (NO ISOLATION) - Swab, Nasopharynx [[id number]]  (Normal) Collected: 09/17/24 2126    Lab Status: Final result  Specimen: Swab from Nasopharynx Updated: 09/17/24 2236    Narrative:      The following orders were created for panel order COVID PRE-OP / PRE-PROCEDURE SCREENING ORDER (NO ISOLATION) - Swab, Nasopharynx.  Procedure                               Abnormality         Status                     ---------                               -----------         ------                     Respiratory Panel PCR w/...[[id number]]  Normal              Final result                 Please view results for these tests on the individual orders.    Respiratory Panel PCR w/COVID-19(SARS-CoV-2) BRIGID/ONI/LICO/PAD/COR/ADA In-House, NP Swab in UTM/VTM, 2 HR TAT - Swab, Nasopharynx [[id number]]  (Normal) Collected: 09/17/24 2126    Lab Status: Final result Specimen: Swab from Nasopharynx Updated: 09/17/24 2236     ADENOVIRUS, PCR Not Detected     Coronavirus 229E Not Detected     Coronavirus HKU1 Not Detected     Coronavirus NL63 Not Detected     Coronavirus OC43 Not Detected     COVID19 Not Detected     Human Metapneumovirus Not Detected     Human Rhinovirus/Enterovirus Not Detected     Influenza A PCR Not Detected     Influenza B PCR Not Detected     Parainfluenza Virus 1 Not Detected     Parainfluenza Virus 2 Not Detected     Parainfluenza Virus 3 Not Detected     Parainfluenza Virus 4 Not Detected     RSV, PCR Not Detected     Bordetella pertussis pcr Not Detected     Bordetella parapertussis PCR Not Detected     Chlamydophila pneumoniae PCR Not Detected     Mycoplasma pneumo by PCR Not Detected    Narrative:      In the setting of a positive respiratory panel with a viral infection PLUS a negative procalcitonin without other underlying concern for bacterial infection, consider observing off antibiotics or discontinuation of antibiotics and continue supportive care. If the respiratory panel is positive for atypical bacterial infection (Bordetella pertussis, Chlamydophila pneumoniae, or Mycoplasma pneumoniae), consider antibiotic  de-escalation to target atypical bacterial infection.            CT Abdomen Pelvis Without Contrast    Result Date: 9/17/2024  CT ABDOMEN PELVIS WO CONTRAST Date of Exam: 9/17/2024 10:21 PM EDT Indication: abdominal pain/fever. Comparison: None available. Technique: Axial CT images were obtained of the abdomen and pelvis without the administration of contrast. Reconstructed coronal and sagittal images were also obtained. Automated exposure control and iterative construction methods were used. FINDINGS: Lung bases: Calcified granulomata. Liver:No masses. No intrahepatic biliary ductal dilatation. Spleen: Calcified granulomata. Pancreas:No pancreatic masses. No evidence of pancreatitis. Gallbladder and common bile duct:No evidence of cholelithiasis. No evidence of cholecystitis. Adrenal glands:No adrenal masses Kidneys and ureters: Multiple bilateral renal cysts. No calculi present within the ureters. Normal caliber ureters. Urinary bladder:No urinary bladder wall thickening. No bladder masses. Small bowel:Normal caliber small bowel. Large bowel: Extensive colonic diverticulosis. Appendix: Normal GENITOURINARY: Normal prostate Ascites or pneumoperitoneum:None. Adenopathy:None present Osseous structures: The proximal femurs are intact. The pubic bones are intact. Lumbar vertebral body height and alignment are normal. No lytic or blastic disease. Other findings: None     Impression: No acute abdominal or pelvic pathology. Electronically Signed: Karsten Anderson MD  9/17/2024 10:43 PM EDT  Workstation ID: [id number]    CT Head Without Contrast    Result Date: 9/17/2024  CT HEAD WO CONTRAST Date of Exam: 9/17/2024 10:21 PM EDT Indication: CONFUSION. Comparison: None available. Technique: Axial CT images were obtained of the head without contrast administration.  Automated exposure control and iterative construction methods were used. Findings: No intracranial hemorrhage. Gray-white matter differentiation is maintained without  evidence of an acute infarction. Multiple foci of decreased attenuation are present within the subcortical, deep cerebral, and periventricular white matter consistent with chronic small vessel/microangiopathic ischemic changes. No extra-axial mass or collection. The ventricles and sulci are prominent commensurate with involutional changes. The posterior fossa appears grossly normal. Sellar and suprasellar structures are normal. Bilateral lens replacements. The paranasal sinuses, ethmoid air cells, and mastoid air cells are aerated. The bony calvarium is intact.     Impression: Impression: No acute intracranial pathology. Electronically Signed: Karsten Anderson MD  9/17/2024 10:30 PM EDT  Workstation ID: [id number]    XR Chest 1 View    Result Date: 9/17/2024  XR CHEST 1 VW Date of Exam: 9/17/2024 9:45 PM EDT Indication: abdominal pain/fever Comparison: None available. Findings: Low lung volumes. Enlarged cardiac silhouette. No focal airspace consolidation. Possible trace bilateral pleural effusions. No pneumothorax. No acute osseous abnormality.     Impression: Impression: Enlarged cardiac silhouette with possible trace bilateral pleural effusions. Hypoventilatory changes without definite airspace opacity. Electronically Signed: Vishal Valadez MD  9/17/2024 10:04 PM EDT  Workstation ID: [id number]     Results for orders placed in visit on 07/20/22    Adult Transthoracic Echo Complete W/ Cont if Necessary Per Protocol    Interpretation Summary  · Estimated left ventricular EF = 55% Estimated left ventricular EF was in disagreement with the calculated left ventricular EF. Left ventricular ejection fraction appears to be 56 - 60%. Left ventricular systolic function is normal.  · Left ventricular diastolic function is consistent with (grade II w/high LAP) pseudonormalization.  · Left ventricular wall thickness is consistent with mild concentric hypertrophy.  · Estimated right ventricular systolic pressure from tricuspid  regurgitation is normal (<35 mmHg).  · Mild aortic sclerosis without obstruction  · The RV has normal size and function  · Trvial TR  · No pericardial effusion      Assessment & Plan   Assessment & Plan       Weakness    Carotid artery stenosis, asymptomatic, bilateral    Hyperlipidemia LDL goal <70    Type 2 diabetes mellitus with stage 3a chronic kidney disease, without long-term current use of insulin    Benign essential hypertension    Dementia without behavioral disturbance    Orestes Garcia Jr. is a 84 y.o. male with a history of HTN, HLD, carotid artery disease, CKD, dementia, T2DM, presents to the ED with complaints of nausea, vomiting and diarrhea for the past 3 days.      Assessment and plan:    Acute gastroenteritis  Lactic acidosis  --Was [facility] from [facility] on [facility]/17 for acute pancreatitis  -- Lactate 2.4  -- T 101.6  -- CT abdomen pelvis shows no acute abdominal or pelvic pathology  -- Was given a liter of saline in the ED  -- GI panel PCR pending  -- C. difficile pending  -- IV Fluids  -- Reflex lactate  -- A.m. labs    Prolonged QT interval  -- EKG in the am    CKD  -- Creatinine 1.44  -- Baseline creatinine ~1.3-1.6  -- Continue to monitor    Carotid artery disease  -- Moderate disease by angiogram, 5/5 and right vertebral 100%  -- Continue aspirin and Plavix  -- follows with Dr. Wilkerson    Hypertension  -- Continue lisinopril 5 mg daily    Hyperlipidemia  -- Continue Lipitor    T2DM  -- A1c in the a.m.  -- FSBG with SSI  -- hold metformin    Dementia  -- aricept    Generalized weakness  Fall  -- CT head shows no acute intracranial abnormality  -- Fall precautions  -- PT/OT consult  -- Case management consult    DVT prophylaxis:  Mechanical    CODE STATUS:    Level Of Support Discussed With: Next of Kin (If No Surrogate)  Code Status (Patient has no pulse and is not breathing): CPR (Attempt to Resuscitate)  Medical Interventions (Patient has pulse or is breathing): Full  Support      Expected Discharge  TBD  Expected discharge date/ time has not been documented.      This note has been completed as part of a split-shared workflow.     Signature: Electronically signed by RANI Read, 09/18/24, 1:09 AM EDT.

## 2024-09-18 NOTE — THERAPY EVALUATION
Patient Name: Orestes Garcia Jr.  : 1940    MRN: [id number]                              Today's Date: 2024       Admit Date: 2024    Visit Dx:     ICD-10-CM ICD-9-CM   1. Nausea and vomiting in adult  R11.2 787.01   2. Fever, unspecified fever cause  R50.9 780.60   3. Weakness  R53.1 780.79   4. Acute confusion  R41.0 293.0     Patient Active Problem List   Diagnosis    Carotid artery stenosis, asymptomatic, bilateral    Hyperlipidemia LDL goal <70    Type 2 diabetes mellitus with stage 3a chronic kidney disease, without long-term current use of insulin    Encounter for long-term (current) use of other medications    Benign essential hypertension    Irritable bowel syndrome with diarrhea    High risk medication use    Diverticular disease of colon    Dementia without behavioral disturbance    Major depressive disorder with single episode, in partial remission    Situational anxiety    Insomnia due to anxiety and fear    DEEP (generalized anxiety disorder)    Weakness     Past Medical History:   Diagnosis Date    Anemia     Benign essential HTN     Carotid artery stenosis 2020    RIGHT CAROTID  40-60% STENOSIS ON DOPPLER RECORD SAYS H/O TIA BUT PT DENIES THIS??    CKD (chronic kidney disease), stage III     Colon polyp 2012    LAST C QUESTIONABLE    Condition not found 2018    COLOGUARD NEG 9-18    Dementia     Depression     Diverticular disease of colon     WITHOUT HEMORRHAGE    Diverticulosis     WITH LOWR GI BLEED APPROS AGE 49    Hearing loss     High risk medication use     IBS (irritable bowel syndrome)     WITH DIARRHEA    Mixed hyperlipidemia     Overweight (BMI 25.0-29.9)     Skin cancer     LEG    Transient cerebral ischemia 2019    POSSIBLE A/W FALL    Type 2 diabetes mellitus     WITH STAGE 3 CKD WITHOUT LONG TERM CURRENT USE OF INSULIN   WITH POTEINURIA     Past Surgical History:   Procedure Laterality Date    CATARACT EXTRACTION      COLONOSCOPY  2012    [facility]    INTERVENTIONAL  RADIOLOGY PROCEDURE Bilateral 8/31/2022    Procedure: Carotid Cerebral Angiogram;  Surgeon: Stan Falk MD;  Location: [facility] INVASIVE LOCATION;  Service: Interventional Radiology;  Laterality: Bilateral;    SKIN BIOPSY Right 2016    LEG    TONSILLECTOMY  1950      General Information       Row Name 09/18/24 1033          Physical Therapy Time and Intention    Document Type evaluation  -CK     Mode of Treatment physical therapy  -CK       Row Name 09/18/24 1033          General Information    Patient Profile Reviewed yes  -CK     Prior Level of Function independent:;all household mobility;ADL's  ind 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, he is retired from the bank  -CK     Existing Precautions/Restrictions fall  -CK     Barriers to Rehab hearing deficit  -CK       Row Name 09/18/24 1033          Living Environment    People in Home spouse  -CK       Row Name 09/18/24 1033          Home Main Entrance    Number of Stairs, Main Entrance none  -CK       Row Name 09/18/24 1033          Stairs Within Home, Primary    Number of Stairs, Within Home, Primary none  -CK       Row Name 09/18/24 1033          Cognition    Orientation Status (Cognition) oriented x 4  -CK       Row Name 09/18/24 1033          Safety Issues, Functional Mobility    Safety Issues Affecting Function (Mobility) awareness of need for assistance;insight into deficits/self-awareness;safety precaution awareness;safety precautions follow-through/compliance;sequencing abilities  -CK     Impairments Affecting Function (Mobility) balance;endurance/activity tolerance;shortness of breath;strength  -CK               User Key  (r) = Recorded By, (t) = Taken By, (c) = Cosigned By      Initials Name Provider Type    CK Verona Hermosillo PT Physical Therapist                   Mobility       Row Name 09/18/24 1034          Bed Mobility    Comment, (Bed Mobility) sitting up in chair with OT upon entry  -CK       Row Name 09/18/24 1034          Sit-Stand Transfer    Sit-Stand  [place] (Transfers) contact guard  -CK     Assistive Device (Sit-Stand Transfers) other (see comments)  JOHANNE HHA  -CK       Row Name 09/18/24 1034          Gait/Stairs (Locomotion)    [place] Level (Gait) minimum assist (75% patient effort);2 person assist;verbal cues  -CK     Assistive Device (Gait) other (see comments)  LUGLENN HHA  -CK     Patient was able to Ambulate yes  -CK     Distance in Feet (Gait) 30  -CK     Deviations/Abnormal Patterns (Gait) bilateral deviations;base of support, narrow;maría decreased;festinating/shuffling;gait speed decreased;stride length decreased  -CK     Bilateral Gait Deviations heel strike decreased  -CK     Comment, (Gait/Stairs) Patient ambulated in room with a shuffled step through gait pattern. Cues provided for increased step length which patient was able to correct some but with increased mediolateral sway requiring ongoing Isaías and HHA. Patient reports feeling significantly weak with mobility.  -CK               User Key  (r) = Recorded By, (t) = Taken By, (c) = Cosigned By      Initials Name Provider Type    CK Verona Hermosillo, PT Physical Therapist                   Obj/Interventions       Row Name 09/18/24 1038          Range of Motion Comprehensive    General Range of Motion bilateral lower extremity ROM WFL  -CK       Row Name 09/18/24 1038          Strength Comprehensive (MMT)    General Manual Muscle Testing (MMT) Assessment lower extremity strength deficits identified  -CK     Comment, General Manual Muscle Testing (MMT) Assessment BLE hips 4/5, knee and ankles 5/5  -CK       Row Name 09/18/24 1038          Balance    Balance Assessment sitting static balance;standing static balance;standing dynamic balance  -CK     Static Sitting Balance standby assist  -CK     Position, Sitting Balance unsupported;sitting in chair  -CK     Static Standing Balance contact guard  -CK     Dynamic Standing Balance minimal assist;2-person assist  -CK     Position/Device  Used, Standing Balance supported;other (see comments)  JOHANNE HHA  -CK     Comment, Balance unsteady with shuffled pattern, requires constant Isaías  -CK       Row Name 09/18/24 1038          Sensory Assessment (Somatosensory)    Sensory Assessment (Somatosensory) LE sensation intact  -CK               User Key  (r) = Recorded By, (t) = Taken By, (c) = Cosigned By      Initials Name Provider Type    CK Verona Hermosillo, PT Physical Therapist                   Goals/Plan       Row Name 09/18/24 1043          Bed Mobility Goal 1 (PT)    Activity/Assistive Device (Bed Mobility Goal 1, PT) sit to supine/supine to sit  -CK     [place] Level/Cues Needed (Bed Mobility Goal 1, PT) independent  -CK     Time Frame (Bed Mobility Goal 1, PT) short term goal (STG);3 days  -CK     Progress/Outcomes (Bed Mobility Goal 1, PT) new goal  -CK       Row Name 09/18/24 1043          Transfer Goal 1 (PT)    Activity/Assistive Device (Transfer Goal 1, PT) sit-to-stand/stand-to-sit;bed-to-chair/chair-to-bed  -CK     [place] Level/Cues Needed (Transfer Goal 1, PT) standby assist  -CK     Time Frame (Transfer Goal 1, PT) long term goal (LTG);10 days  -CK     Progress/Outcome (Transfer Goal 1, PT) new goal  -CK       Row Name 09/18/24 1043          Gait Training Goal 1 (PT)    Activity/Assistive Device (Gait Training Goal 1, PT) gait (walking locomotion);assistive device use  -CK     [place] Level (Gait Training Goal 1, PT) contact guard required  -CK     Distance (Gait Training Goal 1, PT) 150'  -CK     Time Frame (Gait Training Goal 1, PT) long term goal (LTG);10 days  -CK     Progress/Outcome (Gait Training Goal 1, PT) new goal  -CK       Row Name 09/18/24 1043          Therapy Assessment/Plan (PT)    Planned Therapy Interventions (PT) balance training;bed mobility training;gait training;home exercise program;neuromuscular re-education;transfer training;stretching;strengthening;stair training;ROM (range of motion);postural  re-education;patient/family education  -CK               User Key  (r) = Recorded By, (t) = Taken By, (c) = Cosigned By      Initials Name Provider Type    CK Verona Hermosillo, PT Physical Therapist                   Clinical Impression       Row Name 09/18/24 1041          Pain    Pretreatment Pain Rating 0/10 - no pain  -CK     Posttreatment Pain Rating 0/10 - no pain  -CK       Row Name 09/18/24 1041          Plan of Care Review    Plan of Care Reviewed With patient  -CK     Outcome Evaluation Patient presents with deficits in balance, endurance, and strength with ability to ambulate 30' Isaías with LUE HHA. He mobilizing below his baseline indicating IPPT intervention. Recommend D/C to IPR at this time.  -CK       Row Name 09/18/24 1041          Therapy Assessment/Plan (PT)    Patient/Family Therapy Goals Statement (PT) get stronger  -CK     Rehab Potential (PT) good, to achieve stated therapy goals  -CK     Criteria for Skilled Interventions Met (PT) yes;meets criteria;skilled treatment is necessary  -CK     Therapy Frequency (PT) daily  -CK     Predicted Duration of Therapy Intervention (PT) 1 week  -CK       Row Name 09/18/24 1041          Vital Signs    Pre Systolic BP Rehab 163  -CK     Pre Treatment Diastolic BP 78  -CK     Post Systolic BP Rehab 171  -CK     Post Treatment Diastolic BP 79  -CK     Posttreatment Heart Rate (beats/min) 58  -CK     O2 Delivery Pre Treatment nasal cannula  -CK     Intra SpO2 (%) 92  -CK     O2 Delivery Intra Treatment room air  -CK     Post SpO2 (%) 96  -CK     O2 Delivery Post Treatment room air  -CK     Pre Patient Position Sitting  -CK     Post Patient Position Sitting  -CK       Row Name 09/18/24 1041          Positioning and Restraints    Pre-Treatment Position sitting in chair/recliner  -CK     Post Treatment Position chair  -CK     In Chair reclined;call light within reach;encouraged to call for assist;exit alarm on;waffle cushion;notified nsg  -CK               User  Key  (r) = Recorded By, (t) = Taken By, (c) = Cosigned By      Initials Name Provider Type    Verona Mooney, RAQUEL Physical Therapist                   Outcome Measures       Row Name 09/18/24 1045 09/18/24 0255       How much help from another person do you currently need...    Turning from your back to your side while in flat bed without using bedrails? 3  -CK 2  -KH    Moving from lying on back to sitting on the side of a flat bed without bedrails? 3  -CK 2  -KH    Moving to and from a bed to a chair (including a wheelchair)? 3  -CK 2  -KH    Standing up from a chair using your arms (e.g., wheelchair, bedside chair)? 3  -CK 2  -KH    Climbing 3-5 steps with a railing? 2  -CK 2  -KH    To walk in hospital room? 3  -CK 2  -KH    AM-PAC 6 Clicks Score (PT) 17  -CK 12  -KH    Highest Level of Mobility Goal 5 --> Static standing  -CK 4 --> Transfer to chair/commode  -KH      Row Name 09/18/24 1045          Functional Assessment    Outcome Measure Options AM-PAC 6 Clicks Basic Mobility (PT)  -               User Key  (r) = Recorded By, (t) = Taken By, (c) = Cosigned By      Initials Name Provider Type    Verona Mooney, RAQUEL Physical Therapist    Lety Obregon, RN Registered Nurse                                 Physical Therapy Education       Title: PT OT SLP Therapies (In Progress)       Topic: Physical Therapy (In Progress)       Point: Mobility training (Done)       Learning Progress Summary             Patient Acceptance, E, VU by  at 9/18/2024 1045                         Point: Home exercise program (Not Started)       Learner Progress:  Not documented in this visit.              Point: Body mechanics (Done)       Learning Progress Summary             Patient Acceptance, E, VU by  at 9/18/2024 1045                         Point: Precautions (Done)       Learning Progress Summary             Patient Acceptance, E, VU by  at 9/18/2024 1045                                         User Key        Initials Effective Dates Name Provider Type Discipline    CK 02/06/24 -  Verona Hermosillo PT Physical Therapist PT                  PT Recommendation and Plan  Planned Therapy Interventions (PT): balance training, bed mobility training, gait training, home exercise program, neuromuscular re-education, transfer training, stretching, strengthening, stair training, ROM (range of motion), postural re-education, patient/family education  Plan of Care Reviewed With: patient  Outcome Evaluation: Patient presents with deficits in balance, endurance, and strength with ability to ambulate 30' Isaías with JOHANNE BHATTA. He mobilizing below his baseline indicating IPPT intervention. Recommend D/C to IPR at this time.     Time Calculation:   PT Evaluation Complexity  History, PT Evaluation Complexity: 3 or more personal factors and/or comorbidities  Examination of Body Systems (PT Eval Complexity): total of 3 or more elements  Clinical Presentation (PT Evaluation Complexity): stable  Clinical Decision Making (PT Evaluation Complexity): low complexity  Overall Complexity (PT Evaluation Complexity): low complexity     PT Charges       Row Name 09/18/24 1046             Time Calculation    Start Time 0940  -CK      PT Received On 09/18/24  -CK      PT Goal Re-Cert Due Date 09/28/24  -CK         Untimed Charges    PT Eval/Re-eval Minutes 46  -CK         Total Minutes    Untimed Charges Total Minutes 46  -CK       Total Minutes 46  -CK                User Key  (r) = Recorded By, (t) = Taken By, (c) = Cosigned By      Initials Name Provider Type    CK Verona Hermosillo PT Physical Therapist                  Therapy Charges for Today       Code Description Service Date Service Provider Modifiers Qty    75690488911 HC PT EVAL LOW COMPLEXITY 4 9/18/2024 Verona Hermosillo PT GP 1            PT G-Codes  Outcome Measure Options: AM-PAC 6 Clicks Basic Mobility (PT)  AM-PAC 6 Clicks Score (PT): 17  PT Discharge Summary  Anticipated  Discharge Disposition (PT): inpatient rehabilitation facility    Verona Hermosillo, PT  9/18/2024

## 2024-09-18 NOTE — PLAN OF CARE
Goal Outcome Evaluation:  Plan of Care Reviewed With: patient        Progress: no change  Outcome Evaluation: Baseline ADL completino limited by balance, strength, and functional endurance deficits warranting skilled IP OT services to promote return to PLOF. Short shuffled steps observed, poor dynamic standing balance. Rec d/c to IRF for best functional outcomes.      Anticipated Discharge Disposition (OT): inpatient rehabilitation facility

## 2024-09-19 PROBLEM — B95.62 MRSA BACTEREMIA: Status: ACTIVE | Noted: 2024-09-19

## 2024-09-19 PROBLEM — R78.81 BACTEREMIA: Status: ACTIVE | Noted: 2024-09-19

## 2024-09-19 PROBLEM — R78.81 MRSA BACTEREMIA: Status: ACTIVE | Noted: 2024-09-19

## 2024-09-19 PROBLEM — A08.11 NOROVIRUS: Status: ACTIVE | Noted: 2024-09-19

## 2024-09-19 LAB
ANION GAP SERPL CALCULATED.3IONS-SCNC: 10 MMOL/L (ref 5–15)
BACTERIA BLD CULT: ABNORMAL
BOTTLE TYPE: ABNORMAL
BUN SERPL-MCNC: 14 MG/DL (ref 8–23)
BUN/CREAT SERPL: 11.7 (ref 7–25)
CALCIUM SPEC-SCNC: 8.1 MG/DL (ref 8.6–10.5)
CHLORIDE SERPL-SCNC: 106 MMOL/L (ref 98–107)
CO2 SERPL-SCNC: 23 MMOL/L (ref 22–29)
CREAT SERPL-MCNC: 1.2 MG/DL (ref 0.76–1.27)
EGFRCR SERPLBLD CKD-EPI 2021: 58.9 ML/MIN/1.73
GLUCOSE BLDC GLUCOMTR-MCNC: 143 MG/DL (ref 70–130)
GLUCOSE BLDC GLUCOMTR-MCNC: 145 MG/DL (ref 70–130)
GLUCOSE BLDC GLUCOMTR-MCNC: 146 MG/DL (ref 70–130)
GLUCOSE BLDC GLUCOMTR-MCNC: 92 MG/DL (ref 70–130)
GLUCOSE SERPL-MCNC: 81 MG/DL (ref 65–99)
POTASSIUM SERPL-SCNC: 3.8 MMOL/L (ref 3.5–5.2)
SODIUM SERPL-SCNC: 139 MMOL/L (ref 136–145)

## 2024-09-19 PROCEDURE — 99232 SBSQ HOSP IP/OBS MODERATE 35: CPT | Performed by: INTERNAL MEDICINE

## 2024-09-19 PROCEDURE — 25010000002 VANCOMYCIN HCL IN NACL 1.75-0.9 GM/500ML-% SOLUTION

## 2024-09-19 PROCEDURE — 80048 BASIC METABOLIC PNL TOTAL CA: CPT | Performed by: INTERNAL MEDICINE

## 2024-09-19 PROCEDURE — 82948 REAGENT STRIP/BLOOD GLUCOSE: CPT

## 2024-09-19 RX ORDER — VANCOMYCIN 1.75 GRAM/500 ML IN 0.9 % SODIUM CHLORIDE INTRAVENOUS
1750 ONCE
Status: COMPLETED | OUTPATIENT
Start: 2024-09-19 | End: 2024-09-19

## 2024-09-19 RX ADMIN — BUSPIRONE HYDROCHLORIDE 5 MG: 10 TABLET ORAL at 20:11

## 2024-09-19 RX ADMIN — Medication 10 ML: at 20:15

## 2024-09-19 RX ADMIN — DONEPEZIL HYDROCHLORIDE 10 MG: 10 TABLET, FILM COATED ORAL at 20:11

## 2024-09-19 RX ADMIN — Medication 1750 MG: at 11:02

## 2024-09-19 RX ADMIN — CITALOPRAM HYDROBROMIDE 20 MG: 20 TABLET ORAL at 09:32

## 2024-09-19 RX ADMIN — CLOPIDOGREL BISULFATE 75 MG: 75 TABLET ORAL at 09:32

## 2024-09-19 RX ADMIN — ATORVASTATIN CALCIUM 20 MG: 20 TABLET, FILM COATED ORAL at 20:11

## 2024-09-19 RX ADMIN — ASPIRIN 81 MG: 81 TABLET, COATED ORAL at 09:42

## 2024-09-19 RX ADMIN — Medication 10 ML: at 09:35

## 2024-09-19 RX ADMIN — BUSPIRONE HYDROCHLORIDE 5 MG: 10 TABLET ORAL at 09:32

## 2024-09-19 NOTE — PROGRESS NOTES
"Pharmacy Consult - Vancomycin Dosing and Monitoring    Orestes Garcia Jr. is a 84 y.o. male receiving vancomycin therapy.     Indication: bacteremia  Consulting Provider: Jonel Eduardo  ID Consult: no    Goal AUC: 400-600 mg/L*hr    Current Antimicrobial Therapy  Anti-Infectives (From admission, onward)      Ordered     Dose/Rate Route Frequency Start Stop    09/19/24 0938  Pharmacy to dose vancomycin        Ordering Provider: Jonel Eduardo MD     Does not apply Continuous PRN 09/19/24 0937 09/26/24 0936          Allergies  Allergies as of 09/17/2024    (No Known Allergies)     Labs  Results from last 7 days   Lab Units 09/19/24  0629 09/18/24  0540 09/17/24  2121   BUN mg/dL 14 20 23   CREATININE mg/dL 1.20 1.35* 1.44*     Results from last 7 days   Lab Units 09/18/24  0540 09/17/24  2121   WBC 10*3/mm3 7.54 9.73     Evaluation of Dosing     Last Dose Received in the ED/Outside Facility: no  Is Patient on Dialysis or Renal Replacement: no    Height - 177.8 cm (70\")  Weight - 87.5 kg (193 lb)    Estimated Creatinine Clearance: 54.7 mL/min (by C-G formula based on SCr of 1.2 mg/dL).    I/O last 3 completed shifts:  In: 1240 [P.O.:240; IV Piggyback:1000]  Out: 150 [Urine:150]    Microbiology and Radiology  Microbiology Results (last 10 days)       Procedure Component Value - Date/Time    Gastrointestinal Panel, PCR - Stool, Per Rectum [[id number]]  (Abnormal) Collected: 09/17/24 2341    Lab Status: Final result Specimen: Stool from Per Rectum Updated: 09/18/24 0819     Campylobacter Not Detected     Plesiomonas shigelloides Not Detected     Salmonella Not Detected     Vibrio Not Detected     Vibrio cholerae Not Detected     Yersinia enterocolitica Not Detected     Enteroaggregative E. coli (EAEC) Not Detected     Enteropathogenic E. coli (EPEC) Not Detected     Enterotoxigenic E. coli (ETEC) lt/st Not Detected     Shiga-like toxin-producing E. coli (STEC) stx1/stx2 Not Detected     Shigella/Enteroinvasive E. coli " (EIEC) Not Detected     Cryptosporidium Not Detected     Cyclospora cayetanensis Not Detected     Entamoeba histolytica Not Detected     Giardia lamblia Not Detected     Adenovirus F40/41 Not Detected     Astrovirus Not Detected     Norovirus GI/GII Detected     Comment: If a positive Norovirus result is inconsistent with clinical presentation, the positive Norovirus result should be confirmed using another method.        Rotavirus A Not Detected     Sapovirus (I, II, IV or V) Not Detected    Clostridioides difficile Toxin - Stool, Per Rectum [[id number]]  (Normal) Collected: 09/17/24 2341    Lab Status: Final result Specimen: Stool from Per Rectum Updated: 09/18/24 0758    Narrative:      The following orders were created for panel order Clostridioides difficile Toxin - Stool, Per Rectum.  Procedure                               Abnormality         Status                     ---------                               -----------         ------                     Clostridioides difficile...[[id number]]  Normal              Final result                 Please view results for these tests on the individual orders.    Clostridioides difficile Toxin, PCR - Stool, Per Rectum [[id number]]  (Normal) Collected: 09/17/24 2341    Lab Status: Final result Specimen: Stool from Per Rectum Updated: 09/18/24 0758     Toxigenic C. difficile by PCR Not Detected    Narrative:      The result indicates the absence of toxigenic C. difficile from stool specimen.     Blood Culture - Blood, Hand, Left [[id number]]  (Abnormal) Collected: 09/17/24 2140    Lab Status: Preliminary result Specimen: Blood from Hand, Left Updated: 09/19/24 0640     Blood Culture Abnormal Stain     Gram Stain Anaerobic Bottle Gram positive cocci in pairs and clusters    Narrative:      Less than seven (7) mL's of blood was collected.  Insufficient quantity may yield false negative results.    Blood Culture ID, PCR - Blood, Hand, Left [[id number]]  (Abnormal) Collected:  09/17/24 2140    Lab Status: Final result Specimen: Blood from Hand, Left Updated: 09/19/24 0815     BCID, PCR Staph aureus. mecA/C and MREJ (methicillin resistance gene) detected. Identification by BCID2 PCR.     BOTTLE TYPE Anaerobic Bottle    Narrative:      Infectious disease consultation is highly recommended to rule out distant foci of infection.    Blood Culture - Blood, Arm, Left [[id number]]  (Normal) Collected: 09/17/24 2130    Lab Status: Preliminary result Specimen: Blood from Arm, Left Updated: 09/19/24 0030     Blood Culture No growth at 24 hours    Narrative:      Less than seven (7) mL's of blood was collected.  Insufficient quantity may yield false negative results.    COVID PRE-OP / PRE-PROCEDURE SCREENING ORDER (NO ISOLATION) - Swab, Nasopharynx [[id number]]  (Normal) Collected: 09/17/24 2126    Lab Status: Final result Specimen: Swab from Nasopharynx Updated: 09/17/24 2236    Narrative:      The following orders were created for panel order COVID PRE-OP / PRE-PROCEDURE SCREENING ORDER (NO ISOLATION) - Swab, Nasopharynx.  Procedure                               Abnormality         Status                     ---------                               -----------         ------                     Respiratory Panel PCR w/...[[id number]]  Normal              Final result                 Please view results for these tests on the individual orders.    Respiratory Panel PCR w/COVID-19(SARS-CoV-2) BRIGID/ONI/LICO/PAD/COR/ADA In-House, NP Swab in UTM/VTM, 2 HR TAT - Swab, Nasopharynx [[id number]]  (Normal) Collected: 09/17/24 2126    Lab Status: Final result Specimen: Swab from Nasopharynx Updated: 09/17/24 2236     ADENOVIRUS, PCR Not Detected     Coronavirus 229E Not Detected     Coronavirus HKU1 Not Detected     Coronavirus NL63 Not Detected     Coronavirus OC43 Not Detected     COVID19 Not Detected     Human Metapneumovirus Not Detected     Human Rhinovirus/Enterovirus Not Detected     Influenza A PCR Not  Detected     Influenza B PCR Not Detected     Parainfluenza Virus 1 Not Detected     Parainfluenza Virus 2 Not Detected     Parainfluenza Virus 3 Not Detected     Parainfluenza Virus 4 Not Detected     RSV, PCR Not Detected     Bordetella pertussis pcr Not Detected     Bordetella parapertussis PCR Not Detected     Chlamydophila pneumoniae PCR Not Detected     Mycoplasma pneumo by PCR Not Detected    Narrative:      In the setting of a positive respiratory panel with a viral infection PLUS a negative procalcitonin without other underlying concern for bacterial infection, consider observing off antibiotics or discontinuation of antibiotics and continue supportive care. If the respiratory panel is positive for atypical bacterial infection (Bordetella pertussis, Chlamydophila pneumoniae, or Mycoplasma pneumoniae), consider antibiotic de-escalation to target atypical bacterial infection.          Reported Vancomycin Levels              [employer] AUC Calculation:    Current AUC: -- mg/L*hr    Predicted Steady State AUC on Current Dose: -- mg/L*hr  _________________________________    Predicted Steady State AUC on New Dose:  482 mg/L*hr    Assessment/Plan:    Pharmacy consulted to dose vancomycin with indication of bacteremia.  Goal AUC is 400-600.  Blood cultures positive for GPC in clusters and pairs. BCID for MRSA.  Patient to be given a bolus of 1750 mg (20 mg/kg) and started on a maintenance regimen of 1250 (14.3 mg/kg) q24h which corresponds to a projected AUC of 482.  Will check a vancomycin random level on 9/20 with morning labs.  Pharmacy will continue to follow cultures, renal function, clinical status and will adjust as needed for optimal therapy.  Pharmacy will continue to follow.    Thank you for the consult.    oTd Hodges [facility]  9/19/2024  09:38 EDT

## 2024-09-19 NOTE — CASE MANAGEMENT/SOCIAL WORK
Continued Stay Note  [facility]     Patient Name: Orestes Garcia Jr.  MRN: [id number]  Today's Date: 9/19/2024    Admit Date: 9/17/2024    Plan: SNF   Discharge Plan       Row Name 09/19/24 1115       Plan    Plan SNF    Patient/Family in Agreement with Plan yes    Plan Comments Spoke with patient in room.  Therapy recommends SNF at discharge.  Patient agreeable.  Spoke with wife by phone.  She would like a referral to [facility].  Referral called to [facility].  CM will continue to follow.    Final Discharge Disposition Code 03 - skilled nursing facility (SNF)                   Discharge Codes    No documentation.                 Expected Discharge Date and Time       Expected Discharge Date Expected Discharge Time    Sep 23, 2024               Fatuma Smith RN

## 2024-09-19 NOTE — CONSULTS
INFECTIOUS DISEASE CONSULT/INITIAL HOSPITAL VISIT    Orestes Garcia Jr.  1940  [id number]    Date of Consult: 9/19/2024    Admission Date: 9/17/2024      Requesting Provider: Jose Alberto Vang MD  Evaluating Physician: Mariano Mancuso MD    Reason for Consultation: MRSA bacteremia    History of present illness:    Patient is a 84 y.o. male  With hypertension, hyperlipidemia, coronary carotid artery disease, chronic kidney disease, dementia, diabetes mellitus type 2 has had nausea vomiting diarrhea x 3 days patient received medical care at [facility] for pancreatitis patient discharged home but then had generalized weakness patient given fluids and admitted to hospital for further observation has had normal CT scan abdomen pelvis and chest x-ray showed bilateral pleural effusions because of positive blood cultures for MRSA we are being consulted for further evaluation    Patient had a positive GI PCR panel for norovirus    Patient denies any pain in his arms from previous IV sites patient has any indwelling hardware denies back pain as well    Past Medical History:   Diagnosis Date    Anemia     Benign essential HTN     Carotid artery stenosis 07/2020    RIGHT CAROTID  40-60% STENOSIS ON DOPPLER RECORD SAYS H/O TIA BUT PT DENIES THIS??    CKD (chronic kidney disease), stage III     Colon polyp 2012    LAST C QUESTIONABLE    Condition not found 2018    COLOGUARD NEG 9-18    Dementia     Depression     Diverticular disease of colon     WITHOUT HEMORRHAGE    Diverticulosis     WITH LOWR GI BLEED APPROS AGE 49    Hearing loss     High risk medication use     IBS (irritable bowel syndrome)     WITH DIARRHEA    Mixed hyperlipidemia     Overweight (BMI 25.0-29.9)     Skin cancer     LEG    Transient cerebral ischemia 2019    POSSIBLE A/W FALL    Type 2 diabetes mellitus     WITH STAGE 3 CKD WITHOUT LONG TERM CURRENT USE OF INSULIN   WITH POTEINURIA       Past Surgical History:   Procedure  Laterality Date    CATARACT EXTRACTION  2020    COLONOSCOPY  2012    [facility]    INTERVENTIONAL RADIOLOGY PROCEDURE Bilateral 8/31/2022    Procedure: Carotid Cerebral Angiogram;  Surgeon: Stan Falk MD;  Location: [facility] INVASIVE LOCATION;  Service: Interventional Radiology;  Laterality: Bilateral;    SKIN BIOPSY Right 2016    LEG    TONSILLECTOMY  1950       Family History   Problem Relation Age of Onset    Cancer Mother     Hypertension Mother     Pancreatic cancer Mother     Alzheimer's disease Father     Coronary artery disease Father 86    Diabetes Father     Hearing loss Father        Social History     Socioeconomic History    Marital status:    Tobacco Use    Smoking status: Never    Smokeless tobacco: Never   Vaping Use    Vaping status: Never Used   Substance and Sexual Activity    Alcohol use: Never    Drug use: Never    Sexual activity: Defer       No Known Allergies      Medication:    Current Facility-Administered Medications:     acetaminophen (TYLENOL) tablet 650 mg, 650 mg, Oral, Q4H PRN **OR** acetaminophen (TYLENOL) 160 MG/5ML oral solution 650 mg, 650 mg, Oral, Q4H PRN **OR** acetaminophen (TYLENOL) suppository 650 mg, 650 mg, Rectal, Q4H PRN, Micaela Alcantara APRN    aspirin EC tablet 81 mg, 81 mg, Oral, Daily, Micaela Alcantara APRN, 81 mg at 09/19/24 0942    atorvastatin (LIPITOR) tablet 20 mg, 20 mg, Oral, Nightly, Micaela Alcantara APRN, 20 mg at 09/18/24 2128    busPIRone (BUSPAR) tablet 5 mg, 5 mg, Oral, BID, Micaela Alcantara APRN, 5 mg at 09/19/24 0932    citalopram (CeleXA) tablet 20 mg, 20 mg, Oral, Daily, Jonel Eduardo MD, 20 mg at 09/19/24 0932    clopidogrel (PLAVIX) tablet 75 mg, 75 mg, Oral, Daily, Micaela Alcantara APRN, 75 mg at 09/19/24 0932    dextrose (D50W) (25 g/50 mL) IV injection 25 g, 25 g, Intravenous, Q15 Min PRN, Micaela Alcantara APRN    dextrose (GLUTOSE) oral gel 15 g, 15 g, Oral, Q15 Min PRN, Micaela Alcantara APRN     donepezil (ARICEPT) tablet 10 mg, 10 mg, Oral, Q PM, Micaela Alcantara APRN, 10 mg at 09/18/24 2210    glucagon (GLUCAGEN) injection 1 mg, 1 mg, Intramuscular, Q15 Min PRN, Micaela Alcantara APRN    Insulin Lispro (humaLOG) injection 2-7 Units, 2-7 Units, Subcutaneous, 4x Daily AC & at Bedtime, Micaela Alcantara APRN, 2 Units at 09/18/24 2210    lisinopril (PRINIVIL,ZESTRIL) tablet 5 mg, 5 mg, Oral, Daily, Micaela Alcantara APRRED, 5 mg at 09/18/24 1017    nitroglycerin (NITROSTAT) SL tablet 0.4 mg, 0.4 mg, Sublingual, Q5 Min PRN, Micaela Alcantara APRRED    Pharmacy to dose vancomycin, , Does not apply, Continuous PRN, Jonel Eduardo MD    Sodium Chloride (PF) 0.9 % 10 mL, 10 mL, Intravenous, PRN, Herlinda Quispe MD    [COMPLETED] Insert Peripheral IV, , , Once **AND** sodium chloride 0.9 % flush 10 mL, 10 mL, Intravenous, PRN, Herlinda Quispe MD    sodium chloride 0.9 % flush 10 mL, 10 mL, Intravenous, Q12H, Micaela Alcantara APRN, 10 mL at 09/19/24 0935    sodium chloride 0.9 % flush 10 mL, 10 mL, Intravenous, PRN, Micaela Alcantara APRN    sodium chloride 0.9 % infusion 40 mL, 40 mL, Intravenous, PRN, Micaela Alcantara APRN    traZODone (DESYREL) tablet 50 mg, 50 mg, Oral, Nightly PRN, Micaela Alcantara APRN, 50 mg at 09/18/24 2204    [START ON 9/20/2024] Vancomycin HCl 1,250 mg in sodium chloride 0.9 % 250 mL VTB, 1,250 mg, Intravenous, Q24H, Tdo Hodges, [facility]    Antibiotics:  Anti-Infectives (From admission, onward)      Ordered     Dose/Rate Route Frequency Start Stop    09/19/24 0957  Vancomycin HCl 1,250 mg in sodium chloride 0.9 % 250 mL VTB        Ordering Provider: Tod Hodges RPH    1,250 mg  200 mL/hr over 75 Minutes Intravenous Every 24 Hours 09/20/24 0900 09/27/24 0859    09/19/24 0957  vancomycin IVPB 1750 mg in 0.9% Sodium Chloride (premix) 500 mL        Ordering Provider: Tod Hodges RPH    1,750 mg  285.7 mL/hr over 105 Minutes Intravenous  Once 24 1045 24 1247    24 0938  Pharmacy to dose vancomycin        Ordering Provider: Jonel Eduardo MD     Does not apply Continuous PRN 24 0937 24 0936              Review of Systems:  Denies skin or soft tissue infection    Denies rash    Denies back pain      Physical Exam:   Vital Signs  Temp (24hrs), Av.6 °F (37 °C), Min:98.3 °F (36.8 °C), Max:99.1 °F (37.3 °C)    Temp  Min: 98.3 °F (36.8 °C)  Max: 99.1 °F (37.3 °C)  BP  Min: 152/72  Max: 168/89  Pulse  Min: 53  Max: 71  Resp  Min: 18  Max: 20  SpO2  Min: 90 %  Max: 96 %    GENERAL: Awake and alert, in no acute distress.   HEENT: Normocephalic, atraumatic.  PERRL. EOMI. No conjunctival injection. No icterus. Oropharynx clear without evidence of thrush or exudate. No evidence of periodontal disease.      HEART: RRR; No murmur, rubs, gallops.   LUNGS: Clear to auscultation bilaterally   ABDOMEN: Soft, nontender, nondistended. Positive bowel sounds. No rebound or guarding. NO mass or HSM.  EXT:  No cyanosis, clubbing or edema. No cord.  :  Without Calderon catheter.  MSK: No joint effusions or erythema  SKIN: Warm and dry without cutaneous eruptions on Inspection/palpation.    NEURO: Oriented to PPT.  Motor 5/5 strength      Laboratory Data    Results from last 7 days   Lab Units 24  0540 24   WBC 10*3/mm3 7.54 9.73   HEMOGLOBIN g/dL 12.6* 13.2   HEMATOCRIT % 37.8 40.4   PLATELETS 10*3/mm3 123* 125*     Results from last 7 days   Lab Units 24  0629   SODIUM mmol/L 139   POTASSIUM mmol/L 3.8   CHLORIDE mmol/L 106   CO2 mmol/L 23.0   BUN mg/dL 14   CREATININE mg/dL 1.20   GLUCOSE mg/dL 81   CALCIUM mg/dL 8.1*     Results from last 7 days   Lab Units 24   ALK PHOS U/L 85   BILIRUBIN mg/dL 0.6   ALT (SGPT) U/L 28   AST (SGOT) U/L 47*             Results from last 7 days   Lab Units 24  0030   LACTATE mmol/L 1.5             Estimated Creatinine Clearance: 54.7 mL/min (by C-G formula based on  "SCr of 1.2 mg/dL).      Microbiology:  Blood Culture   Date Value Ref Range Status   09/17/2024 Abnormal Stain (C)  Preliminary     BCID, PCR   Date Value Ref Range Status   09/17/2024 (C) Negative by BCID PCR. Culture to Follow. Final    Staph aureus. mecA/C and MREJ (methicillin resistance gene) detected. Identification by BCID2 PCR.     No results found for: \"CULTURES\", \"HSVCX\", \"URCX\"  No results found for: \"EYECULTURE\", \"GCCX\", \"HSVCULTURE\", \"LABHSV\"  No results found for: \"LEGIONELLA\", \"MRSACX\", \"MUMPSCX\", \"MYCOPLASCX\"  No results found for: \"NOCARDIACX\", \"STOOLCX\"  No results found for: \"THROATCX\", \"UNSTIMCULT\", \"URINECX\", \"CULTURE\", \"VZVCULTUR\"  No results found for: \"VIRALCULTU\", \"WOUNDCX\"        Radiology:  Imaging Results (Last 72 Hours)       Procedure Component Value Units Date/Time    CT Abdomen Pelvis Without Contrast [[id number]] Collected: 09/17/24 2239     Updated: 09/17/24 2246    Narrative:      CT ABDOMEN PELVIS WO CONTRAST    Date of Exam: 9/17/2024 10:21 PM EDT    Indication: abdominal pain/fever.    Comparison: None available.    Technique: Axial CT images were obtained of the abdomen and pelvis without the administration of contrast. Reconstructed coronal and sagittal images were also obtained. Automated exposure control and iterative construction methods were used.    FINDINGS:    Lung bases: Calcified granulomata.    Liver:No masses. No intrahepatic biliary ductal dilatation.    Spleen: Calcified granulomata.    Pancreas:No pancreatic masses. No evidence of pancreatitis.    Gallbladder and common bile duct:No evidence of cholelithiasis. No evidence of cholecystitis.    Adrenal glands:No adrenal masses    Kidneys and ureters: Multiple bilateral renal cysts. No calculi present within the ureters. Normal caliber ureters.    Urinary bladder:No urinary bladder wall thickening. No bladder masses.    Small bowel:Normal caliber small bowel.    Large bowel: Extensive colonic " diverticulosis.    Appendix: Normal    GENITOURINARY: Normal prostate    Ascites or pneumoperitoneum:None.    Adenopathy:None present    Osseous structures: The proximal femurs are intact. The pubic bones are intact. Lumbar vertebral body height and alignment are normal. No lytic or blastic disease.    Other findings: None      Impression:      No acute abdominal or pelvic pathology.            Electronically Signed: Karsten Anderson MD    9/17/2024 10:43 PM EDT    Workstation ID: [id number]    CT Head Without Contrast [[id number]] Collected: 09/17/24 2230     Updated: 09/17/24 2234    Narrative:      CT HEAD WO CONTRAST    Date of Exam: 9/17/2024 10:21 PM EDT    Indication: CONFUSION.    Comparison: None available.    Technique: Axial CT images were obtained of the head without contrast administration.  Automated exposure control and iterative construction methods were used.    Findings: No intracranial hemorrhage. Gray-white matter differentiation is maintained without evidence of an acute infarction. Multiple foci of decreased attenuation are present within the subcortical, deep cerebral, and periventricular white matter   consistent with chronic small vessel/microangiopathic ischemic changes. No extra-axial mass or collection. The ventricles and sulci are prominent commensurate with involutional changes. The posterior fossa appears grossly normal. Sellar and suprasellar   structures are normal.    Bilateral lens replacements. The paranasal sinuses, ethmoid air cells, and mastoid air cells are aerated. The bony calvarium is intact.      Impression:      Impression: No acute intracranial pathology.          Electronically Signed: Karsten Anderson MD    9/17/2024 10:30 PM EDT    Workstation ID: [id number]    XR Chest 1 View [[id number]] Collected: 09/17/24 2203     Updated: 09/17/24 2207    Narrative:      XR CHEST 1 VW    Date of Exam: 9/17/2024 9:45 PM EDT    Indication: abdominal pain/fever    Comparison: None  available.    Findings:  Low lung volumes. Enlarged cardiac silhouette. No focal airspace consolidation. Possible trace bilateral pleural effusions. No pneumothorax. No acute osseous abnormality.      Impression:      Impression:  Enlarged cardiac silhouette with possible trace bilateral pleural effusions. Hypoventilatory changes without definite airspace opacity.      Electronically Signed: Vishal Valadez MD    9/17/2024 10:04 PM EDT    Workstation ID: [id number]              Impression:   MRSA bacteremia  Norovirus gastroenteritis  Fever  Lactic acidosis concerning for tissue perfusion mismatch  PLAN/RECOMMENDATIONS:   Thank you for asking us to see Orestes Garcia Jr., I recommend the following:    Based on physical exam and radiology studies I do not see a source of MRSA bacteremia.    Unclear significance of positive blood cultures certainly MRSA in the blood needs to be treated as if it is real.    Patient clearly had nausea vomiting and diarrhea in the norovirus makes sense to explain the gastro enteritis presentation    Sometimes if patients are mid to the hospital they can have peripheral IVs infiltrated could have a skin soft tissue phlebitis which can lead to bacteremia.    Cover for MRSA with vancomycin per pharmacy dosing    And for area under the cover 400 600    Continue supportive care and appropriate isolation for gastroenteritis from norovirus    Mariano Mancuso MD  9/19/2024  13:30 EDT

## 2024-09-19 NOTE — PROGRESS NOTES
[facility] Medicine Services  PROGRESS NOTE    Patient Name: Orestes Garcia Jr.  : 1940  MRN: [id number]    Date of Admission: 2024  Primary Care Physician: Jose Alberto Vang MD    Subjective   Subjective     CC: Follow-up diarrhea, weakness    HPI: Patient complains of having difficultly urinating      Objective   Objective     Vital Signs:   Temp:  [97.6 °F (36.4 °C)-99.1 °F (37.3 °C)] 98.4 °F (36.9 °C)  Heart Rate:  [53-71] 55  Resp:  [18-20] 18  BP: (152-178)/(72-99) 152/72     Physical Exam:  Constitutional: No acute distress, awake, alert  HENT: NCAT, mucous membranes moist  Respiratory: Clear to auscultation bilaterally, respiratory effort normal   Cardiovascular: RRR, no murmurs, rubs, or gallops  Gastrointestinal: Positive bowel sounds, soft, nontender, nondistended  Musculoskeletal: No bilateral ankle edema  Psychiatric: Appropriate affect, cooperative  Neurologic: Nonfocal, generalized weakness    Results Reviewed:  LAB RESULTS:      Lab 24  0540 24  0030 24  2121   WBC 7.54  --  9.73   HEMOGLOBIN 12.6*  --  13.2   HEMATOCRIT 37.8  --  40.4   PLATELETS 123*  --  125*   NEUTROS ABS 5.51  --  8.14*   IMMATURE GRANS (ABS) 0.04  --  0.03   LYMPHS ABS 0.84  --  0.53*   MONOS ABS 1.07*  --  0.94*   EOS ABS 0.06  --  0.07   MCV 90.6  --  92.4   LACTATE  --  1.5 2.4*         Lab 24  0629 24  0540 24  2121   SODIUM 139 142 139   POTASSIUM 3.8 3.9 4.1   CHLORIDE 106 111* 106   CO2 23.0 21.0* 22.0   ANION GAP 10.0 10.0 11.0   BUN 14 20 23   CREATININE 1.20 1.35* 1.44*   EGFR 58.9* 51.1* 47.3*   GLUCOSE 81 130* 199*   CALCIUM 8.1* 7.6* 8.2*   MAGNESIUM  --  2.0  --    HEMOGLOBIN A1C  --  7.50*  --          Lab 24  2121   TOTAL PROTEIN 6.4   ALBUMIN 3.6   GLOBULIN 2.8   ALT (SGPT) 28   AST (SGOT) 47*   BILIRUBIN 0.6   ALK PHOS 85   LIPASE 108*         Lab 24  2146   HSTROP T 22*                 Brief Urine Lab Results  (Last result in  the past 365 days)        Color   Clarity   Blood   Leuk Est   Nitrite   Protein   CREAT   Urine HCG        09/17/24 2132 Yellow   Cloudy   Trace   Negative   Negative   30 mg/dL (1+)                   Microbiology Results Abnormal       Procedure Component Value - Date/Time    Blood Culture - Blood, Arm, Left [[id number]]  (Normal) Collected: 09/17/24 2130    Lab Status: Preliminary result Specimen: Blood from Arm, Left Updated: 09/19/24 0030     Blood Culture No growth at 24 hours    Narrative:      Less than seven (7) mL's of blood was collected.  Insufficient quantity may yield false negative results.    Clostridioides difficile Toxin - Stool, Per Rectum [[id number]]  (Normal) Collected: 09/17/24 2341    Lab Status: Final result Specimen: Stool from Per Rectum Updated: 09/18/24 0758    Narrative:      The following orders were created for panel order Clostridioides difficile Toxin - Stool, Per Rectum.  Procedure                               Abnormality         Status                     ---------                               -----------         ------                     Clostridioides difficile...[[id number]]  Normal              Final result                 Please view results for these tests on the individual orders.    Clostridioides difficile Toxin, PCR - Stool, Per Rectum [[id number]]  (Normal) Collected: 09/17/24 2341    Lab Status: Final result Specimen: Stool from Per Rectum Updated: 09/18/24 0758     Toxigenic C. difficile by PCR Not Detected    Narrative:      The result indicates the absence of toxigenic C. difficile from stool specimen.     COVID PRE-OP / PRE-PROCEDURE SCREENING ORDER (NO ISOLATION) - Swab, Nasopharynx [[id number]]  (Normal) Collected: 09/17/24 2126    Lab Status: Final result Specimen: Swab from Nasopharynx Updated: 09/17/24 2236    Narrative:      The following orders were created for panel order COVID PRE-OP / PRE-PROCEDURE SCREENING ORDER (NO ISOLATION) - Swab,  Nasopharynx.  Procedure                               Abnormality         Status                     ---------                               -----------         ------                     Respiratory Panel PCR w/...[[id number]]  Normal              Final result                 Please view results for these tests on the individual orders.    Respiratory Panel PCR w/COVID-19(SARS-CoV-2) [facility] In-House, NP Swab in UTM/VTM, 2 HR TAT - Swab, Nasopharynx [[id number]]  (Normal) Collected: 09/17/24 2126    Lab Status: Final result Specimen: Swab from Nasopharynx Updated: 09/17/24 2236     ADENOVIRUS, PCR Not Detected     Coronavirus 229E Not Detected     Coronavirus HKU1 Not Detected     Coronavirus NL63 Not Detected     Coronavirus OC43 Not Detected     COVID19 Not Detected     Human Metapneumovirus Not Detected     Human Rhinovirus/Enterovirus Not Detected     Influenza A PCR Not Detected     Influenza B PCR Not Detected     Parainfluenza Virus 1 Not Detected     Parainfluenza Virus 2 Not Detected     Parainfluenza Virus 3 Not Detected     Parainfluenza Virus 4 Not Detected     RSV, PCR Not Detected     Bordetella pertussis pcr Not Detected     Bordetella parapertussis PCR Not Detected     Chlamydophila pneumoniae PCR Not Detected     Mycoplasma pneumo by PCR Not Detected    Narrative:      In the setting of a positive respiratory panel with a viral infection PLUS a negative procalcitonin without other underlying concern for bacterial infection, consider observing off antibiotics or discontinuation of antibiotics and continue supportive care. If the respiratory panel is positive for atypical bacterial infection (Bordetella pertussis, Chlamydophila pneumoniae, or Mycoplasma pneumoniae), consider antibiotic de-escalation to target atypical bacterial infection.            CT Abdomen Pelvis Without Contrast    Result Date: 9/17/2024  CT ABDOMEN PELVIS WO CONTRAST Date of Exam: 9/17/2024 10:21 PM EDT  Indication: abdominal pain/fever. Comparison: None available. Technique: Axial CT images were obtained of the abdomen and pelvis without the administration of contrast. Reconstructed coronal and sagittal images were also obtained. Automated exposure control and iterative construction methods were used. FINDINGS: Lung bases: Calcified granulomata. Liver:No masses. No intrahepatic biliary ductal dilatation. Spleen: Calcified granulomata. Pancreas:No pancreatic masses. No evidence of pancreatitis. Gallbladder and common bile duct:No evidence of cholelithiasis. No evidence of cholecystitis. Adrenal glands:No adrenal masses Kidneys and ureters: Multiple bilateral renal cysts. No calculi present within the ureters. Normal caliber ureters. Urinary bladder:No urinary bladder wall thickening. No bladder masses. Small bowel:Normal caliber small bowel. Large bowel: Extensive colonic diverticulosis. Appendix: Normal GENITOURINARY: Normal prostate Ascites or pneumoperitoneum:None. Adenopathy:None present Osseous structures: The proximal femurs are intact. The pubic bones are intact. Lumbar vertebral body height and alignment are normal. No lytic or blastic disease. Other findings: None     Impression: No acute abdominal or pelvic pathology. Electronically Signed: Karsten Anderson MD  9/17/2024 10:43 PM EDT  Workstation ID: [id number]    CT Head Without Contrast    Result Date: 9/17/2024  CT HEAD WO CONTRAST Date of Exam: 9/17/2024 10:21 PM EDT Indication: CONFUSION. Comparison: None available. Technique: Axial CT images were obtained of the head without contrast administration.  Automated exposure control and iterative construction methods were used. Findings: No intracranial hemorrhage. Gray-white matter differentiation is maintained without evidence of an acute infarction. Multiple foci of decreased attenuation are present within the subcortical, deep cerebral, and periventricular white matter consistent with chronic small  vessel/microangiopathic ischemic changes. No extra-axial mass or collection. The ventricles and sulci are prominent commensurate with involutional changes. The posterior fossa appears grossly normal. Sellar and suprasellar structures are normal. Bilateral lens replacements. The paranasal sinuses, ethmoid air cells, and mastoid air cells are aerated. The bony calvarium is intact.     Impression: Impression: No acute intracranial pathology. Electronically Signed: Karsten Anderson MD  9/17/2024 10:30 PM EDT  Workstation ID: [id number]    XR Chest 1 View    Result Date: 9/17/2024  XR CHEST 1 VW Date of Exam: 9/17/2024 9:45 PM EDT Indication: abdominal pain/fever Comparison: None available. Findings: Low lung volumes. Enlarged cardiac silhouette. No focal airspace consolidation. Possible trace bilateral pleural effusions. No pneumothorax. No acute osseous abnormality.     Impression: Impression: Enlarged cardiac silhouette with possible trace bilateral pleural effusions. Hypoventilatory changes without definite airspace opacity. Electronically Signed: Vishal Valadez MD  9/17/2024 10:04 PM EDT  Workstation ID: [id number]     Results for orders placed in visit on 07/20/22    Adult Transthoracic Echo Complete W/ Cont if Necessary Per Protocol    Interpretation Summary  · Estimated left ventricular EF = 55% Estimated left ventricular EF was in disagreement with the calculated left ventricular EF. Left ventricular ejection fraction appears to be 56 - 60%. Left ventricular systolic function is normal.  · Left ventricular diastolic function is consistent with (grade II w/high LAP) pseudonormalization.  · Left ventricular wall thickness is consistent with mild concentric hypertrophy.  · Estimated right ventricular systolic pressure from tricuspid regurgitation is normal (<35 mmHg).  · Mild aortic sclerosis without obstruction  · The RV has normal size and function  · Trvial TR  · No pericardial effusion      Current  medications:  Scheduled Meds:aspirin, 81 mg, Oral, Daily  atorvastatin, 20 mg, Oral, Nightly  busPIRone, 5 mg, Oral, BID  citalopram, 20 mg, Oral, Daily  clopidogrel, 75 mg, Oral, Daily  donepezil, 10 mg, Oral, Q PM  insulin lispro, 2-7 Units, Subcutaneous, 4x Daily AC & at Bedtime  lisinopril, 5 mg, Oral, Daily  sodium chloride, 10 mL, Intravenous, Q12H  [START ON 9/20/2024] vancomycin, 1,250 mg, Intravenous, Q24H  vancomycin, 1,750 mg, Intravenous, Once      Continuous Infusions:Pharmacy to dose vancomycin,       PRN Meds:.  acetaminophen **OR** acetaminophen **OR** acetaminophen    dextrose    dextrose    glucagon (human recombinant)    nitroglycerin    Pharmacy to dose vancomycin    Sodium Chloride (PF)    [COMPLETED] Insert Peripheral IV **AND** sodium chloride    sodium chloride    sodium chloride    traZODone    Assessment & Plan   Assessment & Plan     Active Hospital Problems    Diagnosis  POA    **Weakness [R53.1]  Yes    Norovirus [A08.11]  Yes    MRSA bacteremia [R78.81, B95.62]  Yes    Dementia without behavioral disturbance [F03.90]  Yes    Hyperlipidemia LDL goal <70 [E78.5]  Yes    Carotid artery stenosis, asymptomatic, bilateral [I65.23]  Yes    Type 2 diabetes mellitus with stage 3a chronic kidney disease, without long-term current use of insulin [E11.22, N18.31]  Yes    Benign essential hypertension [I10]  Yes      Resolved Hospital Problems   No resolved problems to display.      Brief Hospital Course to date:  Orestes JONY Garcia Jr. is a 84 y.o. male 84-year-old male history of type 2 diabetes, CKD stage III hypertension, hyperlipidemia, dementia carotid stenosis, recent admission at OSH for acute pancreatitis for 2 days, discharged home 9/17.  He presented to [facility] with generalized weakness  s/p fall, found to have norovirus and MRSA bacteremia     Norovirus infection  MRSA bacteremia  -Patient with fever Tmax 101.6 and lactic acidosis   -CT abd/pelvis is unremarkable, respiratory PCR with COVID is  negative, C. difficile toxin was negative, GI PCR panel is + for Norovirus  -Blood cultures, positive for MRSA, pharmacy to dose vancomycin, ID consulted  -Continue supportive therapy, IV fluids, antiemetics     Well-controlled type 2 diabetes A1c 7.5%  -Continue SSI     Hypertension  -BP stable continue lisinopril     Bilateral carotid artery disease  Hyperlipidemia  -Continue statin, aspirin and Plavix  -Follows with Dr. Wilkerson     Dementia  -Continue Aricept     Generalized weakness  Fall  -Suspect overall deconditioning from recent hospitalization  -PT/OT following, recommend rehab     Expected Discharge Location and Transportation: Rehab  Expected Discharge   Expected Discharge Date: 9/23/2024; Expected Discharge Time:      VTE Prophylaxis:  Mechanical VTE prophylaxis orders are present.       AM-PAC 6 Clicks Score (PT): 17 (09/18/24 2000)    CODE STATUS:   Code Status and Medical Interventions: CPR (Attempt to Resuscitate); Full Support   Ordered at: 09/18/24 0105     Level Of Support Discussed With:    Next of Kin (If No Surrogate)     Code Status (Patient has no pulse and is not breathing):    CPR (Attempt to Resuscitate)     Medical Interventions (Patient has pulse or is breathing):    Full Support       Jonel Eduardo MD  09/19/24

## 2024-09-20 LAB
GLUCOSE BLDC GLUCOMTR-MCNC: 130 MG/DL (ref 70–130)
GLUCOSE BLDC GLUCOMTR-MCNC: 187 MG/DL (ref 70–130)
GLUCOSE BLDC GLUCOMTR-MCNC: 187 MG/DL (ref 70–130)
GLUCOSE BLDC GLUCOMTR-MCNC: 295 MG/DL (ref 70–130)

## 2024-09-20 PROCEDURE — 25010000002 VANCOMYCIN HCL 1.25 G RECONSTITUTED SOLUTION 1 EACH VIAL

## 2024-09-20 PROCEDURE — 25810000003 SODIUM CHLORIDE 0.9 % SOLUTION 250 ML FLEX CONT

## 2024-09-20 PROCEDURE — 99232 SBSQ HOSP IP/OBS MODERATE 35: CPT | Performed by: INTERNAL MEDICINE

## 2024-09-20 PROCEDURE — 63710000001 INSULIN LISPRO (HUMAN) PER 5 UNITS: Performed by: NURSE PRACTITIONER

## 2024-09-20 PROCEDURE — 25010000002 HYDRALAZINE PER 20 MG: Performed by: NURSE PRACTITIONER

## 2024-09-20 PROCEDURE — 82948 REAGENT STRIP/BLOOD GLUCOSE: CPT

## 2024-09-20 RX ORDER — HYDRALAZINE HYDROCHLORIDE 20 MG/ML
10 INJECTION INTRAMUSCULAR; INTRAVENOUS ONCE
Status: COMPLETED | OUTPATIENT
Start: 2024-09-20 | End: 2024-09-20

## 2024-09-20 RX ORDER — BISACODYL 5 MG/1
5 TABLET, DELAYED RELEASE ORAL DAILY PRN
Status: DISCONTINUED | OUTPATIENT
Start: 2024-09-20 | End: 2024-09-25 | Stop reason: HOSPADM

## 2024-09-20 RX ORDER — HYDRALAZINE HYDROCHLORIDE 20 MG/ML
20 INJECTION INTRAMUSCULAR; INTRAVENOUS EVERY 6 HOURS PRN
Status: DISCONTINUED | OUTPATIENT
Start: 2024-09-20 | End: 2024-09-25 | Stop reason: HOSPADM

## 2024-09-20 RX ORDER — BISACODYL 10 MG
10 SUPPOSITORY, RECTAL RECTAL DAILY PRN
Status: DISCONTINUED | OUTPATIENT
Start: 2024-09-20 | End: 2024-09-25 | Stop reason: HOSPADM

## 2024-09-20 RX ORDER — AMOXICILLIN 250 MG
2 CAPSULE ORAL 2 TIMES DAILY
Status: DISCONTINUED | OUTPATIENT
Start: 2024-09-20 | End: 2024-09-25 | Stop reason: HOSPADM

## 2024-09-20 RX ORDER — POLYETHYLENE GLYCOL 3350 17 G/17G
17 POWDER, FOR SOLUTION ORAL DAILY PRN
Status: DISCONTINUED | OUTPATIENT
Start: 2024-09-20 | End: 2024-09-25 | Stop reason: HOSPADM

## 2024-09-20 RX ADMIN — CITALOPRAM HYDROBROMIDE 20 MG: 20 TABLET ORAL at 09:22

## 2024-09-20 RX ADMIN — LISINOPRIL 5 MG: 5 TABLET ORAL at 09:22

## 2024-09-20 RX ADMIN — ATORVASTATIN CALCIUM 20 MG: 20 TABLET, FILM COATED ORAL at 21:24

## 2024-09-20 RX ADMIN — SENNOSIDES AND DOCUSATE SODIUM 2 TABLET: 50; 8.6 TABLET ORAL at 21:25

## 2024-09-20 RX ADMIN — HYDRALAZINE HYDROCHLORIDE 10 MG: 20 INJECTION INTRAMUSCULAR; INTRAVENOUS at 02:35

## 2024-09-20 RX ADMIN — INSULIN LISPRO 4 UNITS: 100 INJECTION, SOLUTION INTRAVENOUS; SUBCUTANEOUS at 21:25

## 2024-09-20 RX ADMIN — INSULIN LISPRO 2 UNITS: 100 INJECTION, SOLUTION INTRAVENOUS; SUBCUTANEOUS at 12:25

## 2024-09-20 RX ADMIN — CLOPIDOGREL BISULFATE 75 MG: 75 TABLET ORAL at 09:22

## 2024-09-20 RX ADMIN — BISACODYL 5 MG: 5 TABLET, COATED ORAL at 17:45

## 2024-09-20 RX ADMIN — ASPIRIN 81 MG: 81 TABLET, COATED ORAL at 09:22

## 2024-09-20 RX ADMIN — DONEPEZIL HYDROCHLORIDE 10 MG: 10 TABLET, FILM COATED ORAL at 21:24

## 2024-09-20 RX ADMIN — BUSPIRONE HYDROCHLORIDE 5 MG: 10 TABLET ORAL at 21:24

## 2024-09-20 RX ADMIN — TRAZODONE HYDROCHLORIDE 50 MG: 50 TABLET ORAL at 21:24

## 2024-09-20 RX ADMIN — Medication 10 ML: at 09:21

## 2024-09-20 RX ADMIN — Medication 10 ML: at 21:25

## 2024-09-20 RX ADMIN — INSULIN LISPRO 2 UNITS: 100 INJECTION, SOLUTION INTRAVENOUS; SUBCUTANEOUS at 17:45

## 2024-09-20 RX ADMIN — BUSPIRONE HYDROCHLORIDE 5 MG: 10 TABLET ORAL at 09:21

## 2024-09-20 RX ADMIN — VANCOMYCIN HYDROCHLORIDE 1250 MG: 1.25 INJECTION, POWDER, LYOPHILIZED, FOR SOLUTION INTRAVENOUS at 09:20

## 2024-09-20 NOTE — PROGRESS NOTES
Nutrition Services    Patient Name:  Orestes Garcia Jr.  YOB: 1940  MRN: [id number]  Admit Date:  9/17/2024    Pt identified NPO/Clear Liquid Diet >72 hrs. Advance diet as medically appropriate. RD to monitor for diet advance. Please consult RD if nutrition support indicated.      Electronically signed by:  Elma Carr MS,CASSIA,KEO  09/20/24 14:52 EDT

## 2024-09-20 NOTE — CASE MANAGEMENT/SOCIAL WORK
Continued Stay Note  [facility]     Patient Name: Orestes Garcia Jr.  MRN: [id number]  Today's Date: 9/20/2024    Admit Date: 9/17/2024    Plan: [facility]   Discharge Plan       Row Name 09/20/24 1134       Plan    Plan [facility]    Plan Comments Discussed patient in MDR.  Patient is medically ready for discharge pending final recommendations from ID.  Left voicemail for Anny to start precert with patient's insurance to go to a skilled bed at [facility].  CM will continue to follow.    Final Discharge Disposition Code 03 - skilled nursing facility (SNF)                   Discharge Codes    No documentation.                 Expected Discharge Date and Time       Expected Discharge Date Expected Discharge Time    Sep 23, 2024               Fatuam Smith RN

## 2024-09-20 NOTE — PROGRESS NOTES
[facility] Medicine Services  PROGRESS NOTE    Patient Name: Orestes Garcia Jr.  : 1940  MRN: [id number]    Date of Admission: 2024  Primary Care Physician: Jose Alberto Vang MD    Subjective   Subjective     CC: Follow-up generalized weakness, MRSA bacteremia    HPI: No acute events overnight, patient rested well    Objective   Objective     Vital Signs:   Temp:  [98.2 °F (36.8 °C)-98.4 °F (36.9 °C)] 98.4 °F (36.9 °C)  Heart Rate:  [45-68] 68  Resp:  [16-19] 16  BP: (114-185)/(72-97) 114/91  Flow (L/min):  [2] 2     Physical Exam:  Constitutional: Elderly male, in no acute distress.  HENT: NCAT, mucous membranes moist  Respiratory: Clear to auscultation bilaterally, respiratory effort normal   Cardiovascular: RRR, no murmurs, rubs, or gallops  Gastrointestinal: Positive bowel sounds, soft, nontender, nondistended  Musculoskeletal: No bilateral ankle edema  Psychiatric: Appropriate affect, cooperative  Neurologic: Nonfocal, hard of hearing, some confusion    Results Reviewed:  LAB RESULTS:      Lab 24  0540 24  0030 24  2121   WBC 7.54  --  9.73   HEMOGLOBIN 12.6*  --  13.2   HEMATOCRIT 37.8  --  40.4   PLATELETS 123*  --  125*   NEUTROS ABS 5.51  --  8.14*   IMMATURE GRANS (ABS) 0.04  --  0.03   LYMPHS ABS 0.84  --  0.53*   MONOS ABS 1.07*  --  0.94*   EOS ABS 0.06  --  0.07   MCV 90.6  --  92.4   LACTATE  --  1.5 2.4*         Lab 24  0629 24  0540 24  2121   SODIUM 139 142 139   POTASSIUM 3.8 3.9 4.1   CHLORIDE 106 111* 106   CO2 23.0 21.0* 22.0   ANION GAP 10.0 10.0 11.0   BUN 14 20 23   CREATININE 1.20 1.35* 1.44*   EGFR 58.9* 51.1* 47.3*   GLUCOSE 81 130* 199*   CALCIUM 8.1* 7.6* 8.2*   MAGNESIUM  --  2.0  --    HEMOGLOBIN A1C  --  7.50*  --          Lab 24  2121   TOTAL PROTEIN 6.4   ALBUMIN 3.6   GLOBULIN 2.8   ALT (SGPT) 28   AST (SGOT) 47*   BILIRUBIN 0.6   ALK PHOS 85   LIPASE 108*         Lab 24  2146   HSTROP T 22*                  Brief Urine Lab Results  (Last result in the past 365 days)        Color   Clarity   Blood   Leuk Est   Nitrite   Protein   CREAT   Urine HCG        09/17/24 2132 Yellow   Cloudy   Trace   Negative   Negative   30 mg/dL (1+)                   Microbiology Results Abnormal       Procedure Component Value - Date/Time    Blood Culture - Blood, Arm, Left [[id number]]  (Normal) Collected: 09/17/24 2130    Lab Status: Preliminary result Specimen: Blood from Arm, Left Updated: 09/20/24 0030     Blood Culture No growth at 2 days    Narrative:      Less than seven (7) mL's of blood was collected.  Insufficient quantity may yield false negative results.    Clostridioides difficile Toxin - Stool, Per Rectum [[id number]]  (Normal) Collected: 09/17/24 2341    Lab Status: Final result Specimen: Stool from Per Rectum Updated: 09/18/24 0758    Narrative:      The following orders were created for panel order Clostridioides difficile Toxin - Stool, Per Rectum.  Procedure                               Abnormality         Status                     ---------                               -----------         ------                     Clostridioides difficile...[[id number]]  Normal              Final result                 Please view results for these tests on the individual orders.    Clostridioides difficile Toxin, PCR - Stool, Per Rectum [[id number]]  (Normal) Collected: 09/17/24 2341    Lab Status: Final result Specimen: Stool from Per Rectum Updated: 09/18/24 0758     Toxigenic C. difficile by PCR Not Detected    Narrative:      The result indicates the absence of toxigenic C. difficile from stool specimen.     COVID PRE-OP / PRE-PROCEDURE SCREENING ORDER (NO ISOLATION) - Swab, Nasopharynx [[id number]]  (Normal) Collected: 09/17/24 2126    Lab Status: Final result Specimen: Swab from Nasopharynx Updated: 09/17/24 2236    Narrative:      The following orders were created for panel order COVID PRE-OP / PRE-PROCEDURE SCREENING  ORDER (NO ISOLATION) - Swab, Nasopharynx.  Procedure                               Abnormality         Status                     ---------                               -----------         ------                     Respiratory Panel PCR w/...[[id number]]  Normal              Final result                 Please view results for these tests on the individual orders.    Respiratory Panel PCR w/COVID-19(SARS-CoV-2) [facility]/ONI/L[facility] In-House, NP Swab in UTM/VTM, 2 HR TAT - Swab, Nasopharynx [[id number]]  (Normal) Collected: 09/17/24 2126    Lab Status: Final result Specimen: Swab from Nasopharynx Updated: 09/17/24 2236     ADENOVIRUS, PCR Not Detected     Coronavirus 229E Not Detected     Coronavirus HKU1 Not Detected     Coronavirus NL63 Not Detected     Coronavirus OC43 Not Detected     COVID19 Not Detected     Human Metapneumovirus Not Detected     Human Rhinovirus/Enterovirus Not Detected     Influenza A PCR Not Detected     Influenza B PCR Not Detected     Parainfluenza Virus 1 Not Detected     Parainfluenza Virus 2 Not Detected     Parainfluenza Virus 3 Not Detected     Parainfluenza Virus 4 Not Detected     RSV, PCR Not Detected     Bordetella pertussis pcr Not Detected     Bordetella parapertussis PCR Not Detected     Chlamydophila pneumoniae PCR Not Detected     Mycoplasma pneumo by PCR Not Detected    Narrative:      In the setting of a positive respiratory panel with a viral infection PLUS a negative procalcitonin without other underlying concern for bacterial infection, consider observing off antibiotics or discontinuation of antibiotics and continue supportive care. If the respiratory panel is positive for atypical bacterial infection (Bordetella pertussis, Chlamydophila pneumoniae, or Mycoplasma pneumoniae), consider antibiotic de-escalation to target atypical bacterial infection.            No radiology results from the last 24 hrs    Results for orders placed in visit on 07/20/22    Adult  Transthoracic Echo Complete W/ Cont if Necessary Per Protocol    Interpretation Summary  · Estimated left ventricular EF = 55% Estimated left ventricular EF was in disagreement with the calculated left ventricular EF. Left ventricular ejection fraction appears to be 56 - 60%. Left ventricular systolic function is normal.  · Left ventricular diastolic function is consistent with (grade II w/high LAP) pseudonormalization.  · Left ventricular wall thickness is consistent with mild concentric hypertrophy.  · Estimated right ventricular systolic pressure from tricuspid regurgitation is normal (<35 mmHg).  · Mild aortic sclerosis without obstruction  · The RV has normal size and function  · Trvial TR  · No pericardial effusion      Current medications:  Scheduled Meds:aspirin, 81 mg, Oral, Daily  atorvastatin, 20 mg, Oral, Nightly  busPIRone, 5 mg, Oral, BID  citalopram, 20 mg, Oral, Daily  clopidogrel, 75 mg, Oral, Daily  donepezil, 10 mg, Oral, Q PM  insulin lispro, 2-7 Units, Subcutaneous, 4x Daily AC & at Bedtime  lisinopril, 5 mg, Oral, Daily  sodium chloride, 10 mL, Intravenous, Q12H  vancomycin, 1,250 mg, Intravenous, Q24H      Continuous Infusions:Pharmacy to dose vancomycin,       PRN Meds:.  acetaminophen **OR** acetaminophen **OR** acetaminophen    dextrose    dextrose    glucagon (human recombinant)    nitroglycerin    Pharmacy to dose vancomycin    Sodium Chloride (PF)    [COMPLETED] Insert Peripheral IV **AND** sodium chloride    sodium chloride    sodium chloride    traZODone    Assessment & Plan   Assessment & Plan     Active Hospital Problems    Diagnosis  POA    **Weakness [R53.1]  Yes    Norovirus [A08.11]  Yes    MRSA bacteremia [R78.81, B95.62]  Yes    Bacteremia [R78.81]  Yes    Dementia without behavioral disturbance [F03.90]  Yes    Hyperlipidemia LDL goal <70 [E78.5]  Yes    Carotid artery stenosis, asymptomatic, bilateral [I65.23]  Yes    Type 2 diabetes mellitus with stage 3a chronic kidney  disease, without long-term current use of insulin [E11.22, N18.31]  Yes    Benign essential hypertension [I10]  Yes      Resolved Hospital Problems   No resolved problems to display.        Brief Hospital Course to date:  Orestes Garcia Jr. is a 84 y.o. male 84-year-old male history of type 2 diabetes, CKD stage III hypertension, hyperlipidemia, dementia carotid stenosis, recent admission at OSH for acute pancreatitis for 2 days, discharged home 9/17.  He presented to [facility] with generalized weakness  s/p fall, found to have norovirus and MRSA bacteremia     Norovirus infection  MRSA bacteremia  -Patient with fever Tmax 101.6 and lactic acidosis   -CT abd/pelvis is unremarkable, respiratory PCR with COVID is negative, C. difficile toxin was negative, GI PCR panel is + for Norovirus  -Blood cultures, positive for MRSA,  ID consulted, continue vancomycin for now  -Continue supportive therapy, IV fluids, antiemetics     Well-controlled type 2 diabetes A1c 7.5%  -Continue SSI     Hypertension  -BP stable continue lisinopril     Bilateral carotid artery disease  Hyperlipidemia  -Continue statin, aspirin and Plavix  -Follows with Dr. Wilkerson     Dementia  -Continue Aricept     Generalized weakness  Fall  -Suspect overall deconditioning from recent hospitalization  -PT/OT following, recommend rehab    Expected Discharge Location and Transportation: Rehab  Expected Discharge   Expected Discharge Date: 9/23/2024; Expected Discharge Time:      VTE Prophylaxis:  Mechanical VTE prophylaxis orders are present.         AM-PAC 6 Clicks Score (PT): 17 (09/19/24 2011)    CODE STATUS:   Code Status and Medical Interventions: CPR (Attempt to Resuscitate); Full Support   Ordered at: 09/18/24 0105     Level Of Support Discussed With:    Next of Kin (If No Surrogate)     Code Status (Patient has no pulse and is not breathing):    CPR (Attempt to Resuscitate)     Medical Interventions (Patient has pulse or is breathing):    Full Support        Jonel Eduardo MD  09/20/24

## 2024-09-20 NOTE — PROGRESS NOTES
INFECTIOUS DISEASE CONSULT/INITIAL HOSPITAL VISIT    Orestes Garcia Jr.  1940  [id number]    Date of Consult: 9/20/2024    Admission Date: 9/17/2024      Requesting Provider: Jose Alberto Vang MD  Evaluating Physician: Mariano Mancuso MD    Reason for Consultation: MRSA bacteremia    History of present illness:    Patient is a 84 y.o. male  With hypertension, hyperlipidemia, coronary carotid artery disease, chronic kidney disease, dementia, diabetes mellitus type 2 has had nausea vomiting diarrhea x 3 days patient received medical care at [facility] for pancreatitis patient discharged home but then had generalized weakness patient given fluids and admitted to hospital for further observation has had normal CT scan abdomen pelvis and chest x-ray showed bilateral pleural effusions because of positive blood cultures for MRSA we are being consulted for further evaluation    Patient had a positive GI PCR panel for norovirus    Patient denies any pain in his arms from previous IV sites patient has any indwelling hardware denies back pain as well    9/20/24 has left arm redness, pain at piv site; no complaints    Past Medical History:   Diagnosis Date    Anemia     Benign essential HTN     Carotid artery stenosis 07/2020    RIGHT CAROTID  40-60% STENOSIS ON DOPPLER RECORD SAYS H/O TIA BUT PT DENIES THIS??    CKD (chronic kidney disease), stage III     Colon polyp 2012    LAST C QUESTIONABLE    Condition not found 2018    COLOGUARD NEG 9-18    Dementia     Depression     Diverticular disease of colon     WITHOUT HEMORRHAGE    Diverticulosis     WITH LOWR GI BLEED APPROS AGE 49    Hearing loss     High risk medication use     IBS (irritable bowel syndrome)     WITH DIARRHEA    Mixed hyperlipidemia     Overweight (BMI 25.0-29.9)     Skin cancer     LEG    Transient cerebral ischemia 2019    POSSIBLE A/W FALL    Type 2 diabetes mellitus     WITH STAGE 3 CKD WITHOUT LONG TERM CURRENT USE OF  INSULIN   WITH POTEINURIA       Past Surgical History:   Procedure Laterality Date    CATARACT EXTRACTION  2020    COLONOSCOPY  2012    [facility]    INTERVENTIONAL RADIOLOGY PROCEDURE Bilateral 8/31/2022    Procedure: Carotid Cerebral Angiogram;  Surgeon: Stan Falk MD;  Location: [facility] INVASIVE LOCATION;  Service: Interventional Radiology;  Laterality: Bilateral;    SKIN BIOPSY Right 2016    LEG    TONSILLECTOMY  1950       Family History   Problem Relation Age of Onset    Cancer Mother     Hypertension Mother     Pancreatic cancer Mother     Alzheimer's disease Father     Coronary artery disease Father 86    Diabetes Father     Hearing loss Father        Social History     Socioeconomic History    Marital status:    Tobacco Use    Smoking status: Never    Smokeless tobacco: Never   Vaping Use    Vaping status: Never Used   Substance and Sexual Activity    Alcohol use: Never    Drug use: Never    Sexual activity: Defer       No Known Allergies      Medication:    Current Facility-Administered Medications:     acetaminophen (TYLENOL) tablet 650 mg, 650 mg, Oral, Q4H PRN **OR** acetaminophen (TYLENOL) 160 MG/5ML oral solution 650 mg, 650 mg, Oral, Q4H PRN **OR** acetaminophen (TYLENOL) suppository 650 mg, 650 mg, Rectal, Q4H PRN, Micaela Alcantara, APRN    aspirin EC tablet 81 mg, 81 mg, Oral, Daily, Micaela Alcantara, APRN, 81 mg at 09/20/24 0922    atorvastatin (LIPITOR) tablet 20 mg, 20 mg, Oral, Nightly, Micaela Alcantara, APRN, 20 mg at 09/19/24 2011    sennosides-docusate (PERICOLACE) 8.6-50 MG per tablet 2 tablet, 2 tablet, Oral, BID **AND** polyethylene glycol (MIRALAX) packet 17 g, 17 g, Oral, Daily PRN **AND** bisacodyl (DULCOLAX) EC tablet 5 mg, 5 mg, Oral, Daily PRN **AND** bisacodyl (DULCOLAX) suppository 10 mg, 10 mg, Rectal, Daily PRN, Jonel Eduardo MD    busPIRone (BUSPAR) tablet 5 mg, 5 mg, Oral, BID, Micaela Alcantara, APRN, 5 mg at 09/20/24 0921    citalopram (CeleXA)  tablet 20 mg, 20 mg, Oral, Daily, Jonel Eduardo MD, 20 mg at 09/20/24 0922    clopidogrel (PLAVIX) tablet 75 mg, 75 mg, Oral, Daily, Micaela Alcantara APRN, 75 mg at 09/20/24 0922    dextrose (D50W) (25 g/50 mL) IV injection 25 g, 25 g, Intravenous, Q15 Min PRN, Micaela Alcantara APRN    dextrose (GLUTOSE) oral gel 15 g, 15 g, Oral, Q15 Min PRN, Micaela Alcantara APRN    donepezil (ARICEPT) tablet 10 mg, 10 mg, Oral, Q PM, Micaela Alcantara APRN, 10 mg at 09/19/24 2011    glucagon (GLUCAGEN) injection 1 mg, 1 mg, Intramuscular, Q15 Min PRN, Micaela Alcantara APRN    hydrALAZINE (APRESOLINE) injection 20 mg, 20 mg, Intravenous, Q6H PRN, Jonel Eduardo MD    Insulin Lispro (humaLOG) injection 2-7 Units, 2-7 Units, Subcutaneous, 4x Daily AC & at Bedtime, Micaela Alcantara APRN, 2 Units at 09/20/24 1225    lisinopril (PRINIVIL,ZESTRIL) tablet 5 mg, 5 mg, Oral, Daily, Micaela Alcantara APRN, 5 mg at 09/20/24 0922    nitroglycerin (NITROSTAT) SL tablet 0.4 mg, 0.4 mg, Sublingual, Q5 Min PRN, Micaela Alcantara APRN    Pharmacy to dose vancomycin, , Does not apply, Continuous PRN, Jonle Eduardo MD    Sodium Chloride (PF) 0.9 % 10 mL, 10 mL, Intravenous, PRN, Herlinda Quispe MD    [COMPLETED] Insert Peripheral IV, , , Once **AND** sodium chloride 0.9 % flush 10 mL, 10 mL, Intravenous, PRN, Herlinda Quispe MD    sodium chloride 0.9 % flush 10 mL, 10 mL, Intravenous, Q12H, Micaela Alcantara APRN, 10 mL at 09/20/24 0921    sodium chloride 0.9 % flush 10 mL, 10 mL, Intravenous, PRN, Micaela Alcantara, APRN    sodium chloride 0.9 % infusion 40 mL, 40 mL, Intravenous, PRN, Micaela Alcantara, APRN    traZODone (DESYREL) tablet 50 mg, 50 mg, Oral, Nightly PRN, Micaela Alcantara, APRN, 50 mg at 09/18/24 2204    Vancomycin HCl 1,250 mg in sodium chloride 0.9 % 250 mL VTB, 1,250 mg, Intravenous, Q24H, Prebble, Tod, RPH, Last Rate: 200 mL/hr at 09/20/24 0920, 1,250 mg at  24 0920    Antibiotics:  Anti-Infectives (From admission, onward)      Ordered     Dose/Rate Route Frequency Start Stop    24 0957  Vancomycin HCl 1,250 mg in sodium chloride 0.9 % 250 mL VTB        Ordering Provider: Tod Hodges RPH    1,250 mg  200 mL/hr over 75 Minutes Intravenous Every 24 Hours 24 0900 24 0859    24 0957  vancomycin IVPB 1750 mg in 0.9% Sodium Chloride (premix) 500 mL        Ordering Provider: Tod Hodges RPH    1,750 mg  285.7 mL/hr over 105 Minutes Intravenous Once 24 1045 24 1247    24 0938  Pharmacy to dose vancomycin        Ordering Provider: Jonel Eduardo MD     Does not apply Continuous PRN 24 0937 24 0936              Review of Systems:  Denies skin or soft tissue infection    Denies rash    Denies back pain      Physical Exam:   Vital Signs  Temp (24hrs), Av.3 °F (36.8 °C), Min:98.2 °F (36.8 °C), Max:98.4 °F (36.9 °C)    Temp  Min: 98.2 °F (36.8 °C)  Max: 98.4 °F (36.9 °C)  BP  Min: 114/91  Max: 195/88  Pulse  Min: 45  Max: 68  Resp  Min: 16  Max: 19  SpO2  Min: 86 %  Max: 98 %    GENERAL: Awake and alert, in no acute distress.   HEENT: Normocephalic, atraumatic.  PERRL. EOMI. No conjunctival injection. No icterus. Oropharynx clear without evidence of thrush or exudate. No evidence of periodontal disease.      HEART: RRR; No murmur, rubs, gallops.   LUNGS: Clear to auscultation bilaterally   ABDOMEN: Soft, nontender, nondistended. Positive bowel sounds. No rebound or guarding. NO mass or HSM.  EXT:  No cyanosis, clubbing or edema. No cord.  :  Without Calderon catheter.  MSK: No joint effusions or erythema  SKIN: Warm and dry without cutaneous eruptions on Inspection/palpation.    NEURO: Oriented to PPT.  Motor 5/5 strength      Laboratory Data    Results from last 7 days   Lab Units 24  0540 24  2121   WBC 10*3/mm3 7.54 9.73   HEMOGLOBIN g/dL 12.6* 13.2   HEMATOCRIT % 37.8 40.4   PLATELETS 10*3/mm3  "123* 125*     Results from last 7 days   Lab Units 09/19/24  0629   SODIUM mmol/L 139   POTASSIUM mmol/L 3.8   CHLORIDE mmol/L 106   CO2 mmol/L 23.0   BUN mg/dL 14   CREATININE mg/dL 1.20   GLUCOSE mg/dL 81   CALCIUM mg/dL 8.1*     Results from last 7 days   Lab Units 09/17/24  2121   ALK PHOS U/L 85   BILIRUBIN mg/dL 0.6   ALT (SGPT) U/L 28   AST (SGOT) U/L 47*             Results from last 7 days   Lab Units 09/18/24  0030   LACTATE mmol/L 1.5             Estimated Creatinine Clearance: 54.7 mL/min (by C-G formula based on SCr of 1.2 mg/dL).      Microbiology:  Blood Culture   Date Value Ref Range Status   09/17/2024 Abnormal Stain (C)  Preliminary     BCID, PCR   Date Value Ref Range Status   09/17/2024 (C) Negative by BCID PCR. Culture to Follow. Final    Staph aureus. mecA/C and MREJ (methicillin resistance gene) detected. Identification by BCID2 PCR.     No results found for: \"CULTURES\", \"HSVCX\", \"URCX\"  No results found for: \"EYECULTURE\", \"GCCX\", \"HSVCULTURE\", \"LABHSV\"  No results found for: \"LEGIONELLA\", \"MRSACX\", \"MUMPSCX\", \"MYCOPLASCX\"  No results found for: \"NOCARDIACX\", \"STOOLCX\"  No results found for: \"THROATCX\", \"UNSTIMCULT\", \"URINECX\", \"CULTURE\", \"VZVCULTUR\"  No results found for: \"VIRALCULTU\", \"WOUNDCX\"        Radiology:  Imaging Results (Last 72 Hours)       Procedure Component Value Units Date/Time    CT Abdomen Pelvis Without Contrast [[id number]] Collected: 09/17/24 2239     Updated: 09/17/24 2246    Narrative:      CT ABDOMEN PELVIS WO CONTRAST    Date of Exam: 9/17/2024 10:21 PM EDT    Indication: abdominal pain/fever.    Comparison: None available.    Technique: Axial CT images were obtained of the abdomen and pelvis without the administration of contrast. Reconstructed coronal and sagittal images were also obtained. Automated exposure control and iterative construction methods were used.    FINDINGS:    Lung bases: Calcified granulomata.    Liver:No masses. No intrahepatic biliary ductal " dilatation.    Spleen: Calcified granulomata.    Pancreas:No pancreatic masses. No evidence of pancreatitis.    Gallbladder and common bile duct:No evidence of cholelithiasis. No evidence of cholecystitis.    Adrenal glands:No adrenal masses    Kidneys and ureters: Multiple bilateral renal cysts. No calculi present within the ureters. Normal caliber ureters.    Urinary bladder:No urinary bladder wall thickening. No bladder masses.    Small bowel:Normal caliber small bowel.    Large bowel: Extensive colonic diverticulosis.    Appendix: Normal    GENITOURINARY: Normal prostate    Ascites or pneumoperitoneum:None.    Adenopathy:None present    Osseous structures: The proximal femurs are intact. The pubic bones are intact. Lumbar vertebral body height and alignment are normal. No lytic or blastic disease.    Other findings: None      Impression:      No acute abdominal or pelvic pathology.            Electronically Signed: Karsten Anderson MD    9/17/2024 10:43 PM EDT    Workstation ID: [id number]    CT Head Without Contrast [[id number]] Collected: 09/17/24 2230     Updated: 09/17/24 2234    Narrative:      CT HEAD WO CONTRAST    Date of Exam: 9/17/2024 10:21 PM EDT    Indication: CONFUSION.    Comparison: None available.    Technique: Axial CT images were obtained of the head without contrast administration.  Automated exposure control and iterative construction methods were used.    Findings: No intracranial hemorrhage. Gray-white matter differentiation is maintained without evidence of an acute infarction. Multiple foci of decreased attenuation are present within the subcortical, deep cerebral, and periventricular white matter   consistent with chronic small vessel/microangiopathic ischemic changes. No extra-axial mass or collection. The ventricles and sulci are prominent commensurate with involutional changes. The posterior fossa appears grossly normal. Sellar and suprasellar   structures are normal.    Bilateral lens  replacements. The paranasal sinuses, ethmoid air cells, and mastoid air cells are aerated. The bony calvarium is intact.      Impression:      Impression: No acute intracranial pathology.          Electronically Signed: Karsten Anderson MD    9/17/2024 10:30 PM EDT    Workstation ID: [id number]    XR Chest 1 View [[id number]] Collected: 09/17/24 2203     Updated: 09/17/24 2207    Narrative:      XR CHEST 1 VW    Date of Exam: 9/17/2024 9:45 PM EDT    Indication: abdominal pain/fever    Comparison: None available.    Findings:  Low lung volumes. Enlarged cardiac silhouette. No focal airspace consolidation. Possible trace bilateral pleural effusions. No pneumothorax. No acute osseous abnormality.      Impression:      Impression:  Enlarged cardiac silhouette with possible trace bilateral pleural effusions. Hypoventilatory changes without definite airspace opacity.      Electronically Signed: Vishal Valadez MD    9/17/2024 10:04 PM EDT    Workstation ID: [id number]              Impression:   MRSA bacteremia  Norovirus gastroenteritis  Fever  Lactic acidosis concerning for tissue perfusion mismatch  PLAN/RECOMMENDATIONS:   Thank you for asking us to see Orestes Garcia Jr., I recommend the following:    Based on physical exam and radiology studies I do not see a source of MRSA bacteremia.    Unclear significance of positive blood cultures certainly MRSA in the blood needs to be treated as if it is real.    Patient clearly had nausea vomiting and diarrhea in the norovirus makes sense to explain the gastro enteritis presentation    Sometimes if patients are mid to the hospital they can have peripheral IVs infiltrated could have a skin soft tissue phlebitis which can lead to bacteremia.    Cover for MRSA with vancomycin per pharmacy dosing    And for area under the cover 400 600    Continue supportive care and appropriate isolation for gastroenteritis from norovirus    Apply warm compresses to left arm elevate left arm    Follow bl  cx  Mariano Mancuso MD  9/20/2024  16:07 EDT

## 2024-09-20 NOTE — PROGRESS NOTES
"Pharmacy Consult - Vancomycin Dosing and Monitoring    Orestes Garcia Jr. is a 84 y.o. male receiving vancomycin therapy.     Indication: bacteremia  Consulting Provider: Jonel STAPLES Consult: no    Goal AUC: 400-600 mg/L*hr    Current Antimicrobial Therapy  Anti-Infectives (From admission, onward)      Ordered     Dose/Rate Route Frequency Start Stop    09/19/24 0957  Vancomycin HCl 1,250 mg in sodium chloride 0.9 % 250 mL VTB        Ordering Provider: Tod Hodges RPH    1,250 mg  200 mL/hr over 75 Minutes Intravenous Every 24 Hours 09/20/24 0900 09/27/24 0859    09/19/24 0957  vancomycin IVPB 1750 mg in 0.9% Sodium Chloride (premix) 500 mL        Ordering Provider: Tod Hodges RPH    1,750 mg  285.7 mL/hr over 105 Minutes Intravenous Once 09/19/24 1045 09/19/24 1247    09/19/24 0938  Pharmacy to dose vancomycin        Ordering Provider: Jonel Edaurdo MD     Does not apply Continuous PRN 09/19/24 0937 09/26/24 0936          Allergies  Allergies as of 09/17/2024    (No Known Allergies)     Labs  Results from last 7 days   Lab Units 09/19/24  0629 09/18/24  0540 09/17/24  2121   BUN mg/dL 14 20 23   CREATININE mg/dL 1.20 1.35* 1.44*     Results from last 7 days   Lab Units 09/18/24  0540 09/17/24  2121   WBC 10*3/mm3 7.54 9.73     Evaluation of Dosing     Last Dose Received in the ED/Outside Facility: no  Is Patient on Dialysis or Renal Replacement: no    Height - 177.8 cm (70\")  Weight - 87.5 kg (193 lb)    Estimated Creatinine Clearance: 54.7 mL/min (by C-G formula based on SCr of 1.2 mg/dL).    No intake/output data recorded.    Microbiology and Radiology  Microbiology Results (last 10 days)       Procedure Component Value - Date/Time    Gastrointestinal Panel, PCR - Stool, Per Rectum [[id number]]  (Abnormal) Collected: 09/17/24 2341    Lab Status: Final result Specimen: Stool from Per Rectum Updated: 09/18/24 0844     Campylobacter Not Detected     Plesiomonas shigelloides Not Detected     " Salmonella Not Detected     Vibrio Not Detected     Vibrio cholerae Not Detected     Yersinia enterocolitica Not Detected     Enteroaggregative E. coli (EAEC) Not Detected     Enteropathogenic E. coli (EPEC) Not Detected     Enterotoxigenic E. coli (ETEC) lt/st Not Detected     Shiga-like toxin-producing E. coli (STEC) stx1/stx2 Not Detected     Shigella/Enteroinvasive E. coli (EIEC) Not Detected     Cryptosporidium Not Detected     Cyclospora cayetanensis Not Detected     Entamoeba histolytica Not Detected     Giardia lamblia Not Detected     Adenovirus F40/41 Not Detected     Astrovirus Not Detected     Norovirus GI/GII Detected     Comment: If a positive Norovirus result is inconsistent with clinical presentation, the positive Norovirus result should be confirmed using another method.        Rotavirus A Not Detected     Sapovirus (I, II, IV or V) Not Detected    Clostridioides difficile Toxin - Stool, Per Rectum [[id number]]  (Normal) Collected: 09/17/24 2341    Lab Status: Final result Specimen: Stool from Per Rectum Updated: 09/18/24 0758    Narrative:      The following orders were created for panel order Clostridioides difficile Toxin - Stool, Per Rectum.  Procedure                               Abnormality         Status                     ---------                               -----------         ------                     Clostridioides difficile...[[id number]]  Normal              Final result                 Please view results for these tests on the individual orders.    Clostridioides difficile Toxin, PCR - Stool, Per Rectum [[id number]]  (Normal) Collected: 09/17/24 2341    Lab Status: Final result Specimen: Stool from Per Rectum Updated: 09/18/24 0758     Toxigenic C. difficile by PCR Not Detected    Narrative:      The result indicates the absence of toxigenic C. difficile from stool specimen.     Blood Culture - Blood, Hand, Left [[id number]]  (Abnormal) Collected: 09/17/24 2140    Lab Status:  Preliminary result Specimen: Blood from Hand, Left Updated: 09/20/24 0652     Blood Culture Staphylococcus aureus, MRSA     Comment:   Infectious disease consultation is highly recommended to rule out distant foci of infection.  Methicillin resistant Staphylococcus aureus, Patient may be an isolation risk.        Isolated from Anaerobic Bottle     Gram Stain Anaerobic Bottle Gram positive cocci in pairs and clusters    Narrative:      Less than seven (7) mL's of blood was collected.  Insufficient quantity may yield false negative results.    Blood Culture ID, PCR - Blood, Hand, Left [[id number]]  (Abnormal) Collected: 09/17/24 2140    Lab Status: Final result Specimen: Blood from Hand, Left Updated: 09/19/24 0815     BCID, PCR Staph aureus. mecA/C and MREJ (methicillin resistance gene) detected. Identification by BCID2 PCR.     BOTTLE TYPE Anaerobic Bottle    Narrative:      Infectious disease consultation is highly recommended to rule out distant foci of infection.    Blood Culture - Blood, Arm, Left [[id number]]  (Normal) Collected: 09/17/24 2130    Lab Status: Preliminary result Specimen: Blood from Arm, Left Updated: 09/20/24 0030     Blood Culture No growth at 2 days    Narrative:      Less than seven (7) mL's of blood was collected.  Insufficient quantity may yield false negative results.    COVID PRE-OP / PRE-PROCEDURE SCREENING ORDER (NO ISOLATION) - Swab, Nasopharynx [[id number]]  (Normal) Collected: 09/17/24 2126    Lab Status: Final result Specimen: Swab from Nasopharynx Updated: 09/17/24 2236    Narrative:      The following orders were created for panel order COVID PRE-OP / PRE-PROCEDURE SCREENING ORDER (NO ISOLATION) - Swab, Nasopharynx.  Procedure                               Abnormality         Status                     ---------                               -----------         ------                     Respiratory Panel PCR w/...[[id number]]  Normal              Final result                 Please  view results for these tests on the individual orders.    Respiratory Panel PCR w/COVID-19(SARS-CoV-2) BRIGID/ONI/LICO/PAD/COR/ADA In-House, NP Swab in UTM/VTM, 2 HR TAT - Swab, Nasopharynx [[id number]]  (Normal) Collected: 09/17/24 2126    Lab Status: Final result Specimen: Swab from Nasopharynx Updated: 09/17/24 2236     ADENOVIRUS, PCR Not Detected     Coronavirus 229E Not Detected     Coronavirus HKU1 Not Detected     Coronavirus NL63 Not Detected     Coronavirus OC43 Not Detected     COVID19 Not Detected     Human Metapneumovirus Not Detected     Human Rhinovirus/Enterovirus Not Detected     Influenza A PCR Not Detected     Influenza B PCR Not Detected     Parainfluenza Virus 1 Not Detected     Parainfluenza Virus 2 Not Detected     Parainfluenza Virus 3 Not Detected     Parainfluenza Virus 4 Not Detected     RSV, PCR Not Detected     Bordetella pertussis pcr Not Detected     Bordetella parapertussis PCR Not Detected     Chlamydophila pneumoniae PCR Not Detected     Mycoplasma pneumo by PCR Not Detected    Narrative:      In the setting of a positive respiratory panel with a viral infection PLUS a negative procalcitonin without other underlying concern for bacterial infection, consider observing off antibiotics or discontinuation of antibiotics and continue supportive care. If the respiratory panel is positive for atypical bacterial infection (Bordetella pertussis, Chlamydophila pneumoniae, or Mycoplasma pneumoniae), consider antibiotic de-escalation to target atypical bacterial infection.          Reported Vancomycin Levels              [employer] AUC Calculation:    Current AUC: 390 mg/L*hr    Predicted Steady State AUC on Current Dose:  514mg/L*hr  _________________________________    Predicted Steady State AUC on New Dose:  --- mg/L*hr    Assessment/Plan:    Pharmacy consulted to dose vancomycin with indication of bacteremia.  Goal AUC is 400-600.  Blood cultures positive for GPC in clusters and pairs in 1 of 2  bottlea. BCID for MRSA.  Patient was given a bolus of 1750 mg (20 mg/kg) and started on a maintenance regimen of 1250 (14.3 mg/kg) q24h which corresponds to a projected AUC of 514.  Will check a vancomycin random level on 9/21 with morning labs.  Pharmacy will continue to follow cultures, renal function, clinical status and will adjust as needed for optimal therapy.  Pharmacy will continue to follow.    Thank you for the consult.        Tod Hodges RPH  9/20/2024  11:48 EDT

## 2024-09-21 LAB
ANION GAP SERPL CALCULATED.3IONS-SCNC: 10 MMOL/L (ref 5–15)
BUN SERPL-MCNC: 13 MG/DL (ref 8–23)
BUN/CREAT SERPL: 10.9 (ref 7–25)
CALCIUM SPEC-SCNC: 8.8 MG/DL (ref 8.6–10.5)
CHLORIDE SERPL-SCNC: 104 MMOL/L (ref 98–107)
CO2 SERPL-SCNC: 25 MMOL/L (ref 22–29)
CREAT SERPL-MCNC: 1.19 MG/DL (ref 0.76–1.27)
EGFRCR SERPLBLD CKD-EPI 2021: 59.5 ML/MIN/1.73
GLUCOSE BLDC GLUCOMTR-MCNC: 136 MG/DL (ref 70–130)
GLUCOSE BLDC GLUCOMTR-MCNC: 169 MG/DL (ref 70–130)
GLUCOSE BLDC GLUCOMTR-MCNC: 185 MG/DL (ref 70–130)
GLUCOSE BLDC GLUCOMTR-MCNC: 239 MG/DL (ref 70–130)
GLUCOSE SERPL-MCNC: 118 MG/DL (ref 65–99)
POTASSIUM SERPL-SCNC: 3.6 MMOL/L (ref 3.5–5.2)
QT INTERVAL: 464 MS
QTC INTERVAL: 451 MS
SODIUM SERPL-SCNC: 139 MMOL/L (ref 136–145)
VANCOMYCIN SERPL-MCNC: 10 MCG/ML (ref 5–40)

## 2024-09-21 PROCEDURE — 80202 ASSAY OF VANCOMYCIN: CPT

## 2024-09-21 PROCEDURE — 82948 REAGENT STRIP/BLOOD GLUCOSE: CPT

## 2024-09-21 PROCEDURE — 63710000001 INSULIN LISPRO (HUMAN) PER 5 UNITS: Performed by: NURSE PRACTITIONER

## 2024-09-21 PROCEDURE — 25010000002 VANCOMYCIN HCL IN NACL 1.5-0.9 GM/500ML-% SOLUTION

## 2024-09-21 PROCEDURE — 97530 THERAPEUTIC ACTIVITIES: CPT

## 2024-09-21 PROCEDURE — 99232 SBSQ HOSP IP/OBS MODERATE 35: CPT | Performed by: INTERNAL MEDICINE

## 2024-09-21 PROCEDURE — 97110 THERAPEUTIC EXERCISES: CPT

## 2024-09-21 PROCEDURE — 80048 BASIC METABOLIC PNL TOTAL CA: CPT

## 2024-09-21 PROCEDURE — 97116 GAIT TRAINING THERAPY: CPT

## 2024-09-21 RX ORDER — VANCOMYCIN/0.9 % SOD CHLORIDE 1.5G/250ML
1500 PLASTIC BAG, INJECTION (ML) INTRAVENOUS EVERY 24 HOURS
Status: DISCONTINUED | OUTPATIENT
Start: 2024-09-21 | End: 2024-09-25 | Stop reason: HOSPADM

## 2024-09-21 RX ADMIN — Medication 1500 MG: at 10:28

## 2024-09-21 RX ADMIN — CITALOPRAM HYDROBROMIDE 20 MG: 20 TABLET ORAL at 08:20

## 2024-09-21 RX ADMIN — INSULIN LISPRO 3 UNITS: 100 INJECTION, SOLUTION INTRAVENOUS; SUBCUTANEOUS at 08:16

## 2024-09-21 RX ADMIN — CLOPIDOGREL BISULFATE 75 MG: 75 TABLET ORAL at 08:20

## 2024-09-21 RX ADMIN — Medication 10 ML: at 20:07

## 2024-09-21 RX ADMIN — Medication 10 ML: at 08:16

## 2024-09-21 RX ADMIN — DONEPEZIL HYDROCHLORIDE 10 MG: 10 TABLET, FILM COATED ORAL at 20:05

## 2024-09-21 RX ADMIN — ACETAMINOPHEN 650 MG: 325 TABLET ORAL at 20:05

## 2024-09-21 RX ADMIN — ASPIRIN 81 MG: 81 TABLET, COATED ORAL at 08:19

## 2024-09-21 RX ADMIN — INSULIN LISPRO 2 UNITS: 100 INJECTION, SOLUTION INTRAVENOUS; SUBCUTANEOUS at 21:08

## 2024-09-21 RX ADMIN — BUSPIRONE HYDROCHLORIDE 5 MG: 10 TABLET ORAL at 20:05

## 2024-09-21 RX ADMIN — SENNOSIDES AND DOCUSATE SODIUM 2 TABLET: 50; 8.6 TABLET ORAL at 08:20

## 2024-09-21 RX ADMIN — INSULIN LISPRO 2 UNITS: 100 INJECTION, SOLUTION INTRAVENOUS; SUBCUTANEOUS at 12:08

## 2024-09-21 RX ADMIN — BUSPIRONE HYDROCHLORIDE 5 MG: 10 TABLET ORAL at 08:20

## 2024-09-21 RX ADMIN — TRAZODONE HYDROCHLORIDE 50 MG: 50 TABLET ORAL at 20:05

## 2024-09-21 RX ADMIN — ATORVASTATIN CALCIUM 20 MG: 20 TABLET, FILM COATED ORAL at 20:05

## 2024-09-21 RX ADMIN — LISINOPRIL 5 MG: 5 TABLET ORAL at 08:19

## 2024-09-21 NOTE — PROGRESS NOTES
INFECTIOUS DISEASE CONSULT/INITIAL HOSPITAL VISIT    Orestes Garcia Jr.  1940  [id number]    Date of Consult: 9/21/2024    Admission Date: 9/17/2024      Requesting Provider: Jose Alberto Vang MD  Evaluating Physician: Mariano Mancuso MD    Reason for Consultation: MRSA bacteremia    History of present illness:    Patient is a 84 y.o. male  With hypertension, hyperlipidemia, coronary carotid artery disease, chronic kidney disease, dementia, diabetes mellitus type 2 has had nausea vomiting diarrhea x 3 days patient received medical care at [facility] for pancreatitis patient discharged home but then had generalized weakness patient given fluids and admitted to hospital for further observation has had normal CT scan abdomen pelvis and chest x-ray showed bilateral pleural effusions because of positive blood cultures for MRSA we are being consulted for further evaluation    Patient had a positive GI PCR panel for norovirus    Patient denies any pain in his arms from previous IV sites patient has any indwelling hardware denies back pain as well    9/20/24 has left arm redness, pain at piv site; no complaints    9/21/2024 patient is awake alert no complaints poor historian denies pain in his left arm patient is afebrile and has intermittent hypertension    Past Medical History:   Diagnosis Date    Anemia     Benign essential HTN     Carotid artery stenosis 07/2020    RIGHT CAROTID  40-60% STENOSIS ON DOPPLER RECORD SAYS H/O TIA BUT PT DENIES THIS??    CKD (chronic kidney disease), stage III     Colon polyp 2012    LAST C QUESTIONABLE    Condition not found 2018    COLOGUARD NEG 9-18    Dementia     Depression     Diverticular disease of colon     WITHOUT HEMORRHAGE    Diverticulosis     WITH LOWR GI BLEED APPROS AGE 49    Hearing loss     High risk medication use     IBS (irritable bowel syndrome)     WITH DIARRHEA    Mixed hyperlipidemia     Overweight (BMI 25.0-29.9)     Skin cancer      LEG    Transient cerebral ischemia 2019    POSSIBLE A/W FALL    Type 2 diabetes mellitus     WITH STAGE 3 CKD WITHOUT LONG TERM CURRENT USE OF INSULIN   WITH POTEINURIA       Past Surgical History:   Procedure Laterality Date    CATARACT EXTRACTION  2020    COLONOSCOPY  2012    [facility]    INTERVENTIONAL RADIOLOGY PROCEDURE Bilateral 8/31/2022    Procedure: Carotid Cerebral Angiogram;  Surgeon: Stan Falk MD;  Location: [facility] INVASIVE LOCATION;  Service: Interventional Radiology;  Laterality: Bilateral;    SKIN BIOPSY Right 2016    LEG    TONSILLECTOMY  1950       Family History   Problem Relation Age of Onset    Cancer Mother     Hypertension Mother     Pancreatic cancer Mother     Alzheimer's disease Father     Coronary artery disease Father 86    Diabetes Father     Hearing loss Father        Social History     Socioeconomic History    Marital status:    Tobacco Use    Smoking status: Never    Smokeless tobacco: Never   Vaping Use    Vaping status: Never Used   Substance and Sexual Activity    Alcohol use: Never    Drug use: Never    Sexual activity: Defer       No Known Allergies      Medication:    Current Facility-Administered Medications:     acetaminophen (TYLENOL) tablet 650 mg, 650 mg, Oral, Q4H PRN **OR** acetaminophen (TYLENOL) 160 MG/5ML oral solution 650 mg, 650 mg, Oral, Q4H PRN **OR** acetaminophen (TYLENOL) suppository 650 mg, 650 mg, Rectal, Q4H PRN, Micaela Alcantara, APRN    aspirin EC tablet 81 mg, 81 mg, Oral, Daily, Micaela Alcantara, APRN, 81 mg at 09/21/24 0819    atorvastatin (LIPITOR) tablet 20 mg, 20 mg, Oral, Nightly, Micaela Alcantara, APRN, 20 mg at 09/20/24 2124    sennosides-docusate (PERICOLACE) 8.6-50 MG per tablet 2 tablet, 2 tablet, Oral, BID, 2 tablet at 09/21/24 0820 **AND** polyethylene glycol (MIRALAX) packet 17 g, 17 g, Oral, Daily PRN **AND** bisacodyl (DULCOLAX) EC tablet 5 mg, 5 mg, Oral, Daily PRN, 5 mg at 09/20/24 9255 **AND** bisacodyl  (DULCOLAX) suppository 10 mg, 10 mg, Rectal, Daily PRN, Jonel Eduardo MD    busPIRone (BUSPAR) tablet 5 mg, 5 mg, Oral, BID, Micaela Alcantara APRN, 5 mg at 09/21/24 0820    citalopram (CeleXA) tablet 20 mg, 20 mg, Oral, Daily, Jonel Eduardo MD, 20 mg at 09/21/24 0820    clopidogrel (PLAVIX) tablet 75 mg, 75 mg, Oral, Daily, Micaela lAcantara APRN, 75 mg at 09/21/24 0820    dextrose (D50W) (25 g/50 mL) IV injection 25 g, 25 g, Intravenous, Q15 Min PRN, Micaela Alcantara APRN    dextrose (GLUTOSE) oral gel 15 g, 15 g, Oral, Q15 Min PRN, Micaela Alcantara APRN    donepezil (ARICEPT) tablet 10 mg, 10 mg, Oral, Q PM, Micaela Alcantara APRN, 10 mg at 09/20/24 2124    glucagon (GLUCAGEN) injection 1 mg, 1 mg, Intramuscular, Q15 Min PRN, Micaela Alcantara APRN    hydrALAZINE (APRESOLINE) injection 20 mg, 20 mg, Intravenous, Q6H PRN, Jonel Eduardo MD    Insulin Lispro (humaLOG) injection 2-7 Units, 2-7 Units, Subcutaneous, 4x Daily AC & at Bedtime, Micaela Alcantara APRN, 2 Units at 09/21/24 1208    lisinopril (PRINIVIL,ZESTRIL) tablet 5 mg, 5 mg, Oral, Daily, Micaela Alcantara APRN, 5 mg at 09/21/24 0819    nitroglycerin (NITROSTAT) SL tablet 0.4 mg, 0.4 mg, Sublingual, Q5 Min PRN, Micaela Alcantara APRN    Pharmacy to dose vancomycin, , Does not apply, Continuous PRN, Jonel Eduardo MD    Sodium Chloride (PF) 0.9 % 10 mL, 10 mL, Intravenous, PRN, Herlinda Quispe MD    [COMPLETED] Insert Peripheral IV, , , Once **AND** sodium chloride 0.9 % flush 10 mL, 10 mL, Intravenous, PRN, Herlinda Quispe MD    sodium chloride 0.9 % flush 10 mL, 10 mL, Intravenous, Q12H, Micaela Alcantara APRN, 10 mL at 09/21/24 0816    sodium chloride 0.9 % flush 10 mL, 10 mL, Intravenous, PRN, Micaela Alcantara APRN    sodium chloride 0.9 % infusion 40 mL, 40 mL, Intravenous, PRN, Micaela Alcantara, APRN    traZODone (DESYREL) tablet 50 mg, 50 mg, Oral, Nightly PRN, Micaela Alcantara  W, APRN, 50 mg at 24 2124    vancomycin IVPB 1500 mg in 0.9% NaCl (Premix) 500 mL, 1,500 mg, Intravenous, Q24H, Rocio Schmidt, PharmD, Last Rate: 333.3 mL/hr at 24 1028, 1,500 mg at 24 1028    Antibiotics:  Anti-Infectives (From admission, onward)      Ordered     Dose/Rate Route Frequency Start Stop    24 0856  vancomycin IVPB 1500 mg in 0.9% NaCl (Premix) 500 mL        Ordering Provider: Rocio Schmidt, PharmD    1,500 mg  333.3 mL/hr over 90 Minutes Intravenous Every 24 Hours 24 0945 24 0859    24 0957  vancomycin IVPB 1750 mg in 0.9% Sodium Chloride (premix) 500 mL        Ordering Provider: Tod Hodges RPH    1,750 mg  285.7 mL/hr over 105 Minutes Intravenous Once 24 1045 24 1247    24 0938  Pharmacy to dose vancomycin        Ordering Provider: Jonel Eduardo MD     Does not apply Continuous PRN 24 0937 24 0936              Review of Systems:  See HPI    Physical Exam:   Vital Signs  Temp (24hrs), Av.6 °F (37 °C), Min:98.1 °F (36.7 °C), Max:99 °F (37.2 °C)    Temp  Min: 98.1 °F (36.7 °C)  Max: 99 °F (37.2 °C)  BP  Min: 144/80  Max: 177/95  Pulse  Min: 53  Max: 66  Resp  Min: 16  Max: 18  SpO2  Min: 95 %  Max: 98 %    GENERAL: Awake and alert, in no acute distress.   HEENT: Normocephalic, atraumatic.  PERRL. EOMI. No conjunctival injection. No icterus. Oropharynx clear without evidence of thrush or exudate. No evidence of periodontal disease.      HEART: RRR; No murmur, rubs, gallops.   LUNGS: Clear to auscultation bilaterally   ABDOMEN: Soft, nontender,  EXT:  No cyanosis, clubbing or edema. No cord.  :  Without Calderon catheter.  MSK: No joint effusions or erythema  SKIN: Warm and dry without cutaneous eruptions on Inspection/palpation.    NEURO: Oriented to PPT.  Motor 5/5 strength      Laboratory Data    Results from last 7 days   Lab Units 24  0540 24  2121   WBC 10*3/mm3 7.54 9.73   HEMOGLOBIN g/dL 12.6* 13.2  "  HEMATOCRIT % 37.8 40.4   PLATELETS 10*3/mm3 123* 125*     Results from last 7 days   Lab Units 09/21/24  0810   SODIUM mmol/L 139   POTASSIUM mmol/L 3.6   CHLORIDE mmol/L 104   CO2 mmol/L 25.0   BUN mg/dL 13   CREATININE mg/dL 1.19   GLUCOSE mg/dL 118*   CALCIUM mg/dL 8.8     Results from last 7 days   Lab Units 09/17/24  2121   ALK PHOS U/L 85   BILIRUBIN mg/dL 0.6   ALT (SGPT) U/L 28   AST (SGOT) U/L 47*             Results from last 7 days   Lab Units 09/18/24  0030   LACTATE mmol/L 1.5         Results from last 7 days   Lab Units 09/21/24  0810   VANCOMYCIN RM mcg/mL 10.00     Estimated Creatinine Clearance: 55.1 mL/min (by C-G formula based on SCr of 1.19 mg/dL).      Microbiology:  Blood Culture   Date Value Ref Range Status   09/17/2024 Abnormal Stain (C)  Preliminary     BCID, PCR   Date Value Ref Range Status   09/17/2024 (C) Negative by BCID PCR. Culture to Follow. Final    Staph aureus. mecA/C and MREJ (methicillin resistance gene) detected. Identification by BCID2 PCR.     No results found for: \"CULTURES\", \"HSVCX\", \"URCX\"  No results found for: \"EYECULTURE\", \"GCCX\", \"HSVCULTURE\", \"LABHSV\"  No results found for: \"LEGIONELLA\", \"MRSACX\", \"MUMPSCX\", \"MYCOPLASCX\"  No results found for: \"NOCARDIACX\", \"STOOLCX\"  No results found for: \"THROATCX\", \"UNSTIMCULT\", \"URINECX\", \"CULTURE\", \"VZVCULTUR\"  No results found for: \"VIRALCULTU\", \"WOUNDCX\"        Radiology:  Imaging Results (Last 72 Hours)       ** No results found for the last 72 hours. **              Impression:   MRSA bacteremia  Norovirus gastroenteritis  Fever  Lactic acidosis concerning for tissue perfusion mismatch  PLAN/RECOMMENDATIONS:   Thank you for asking us to see Orestes Garcia Jr., I recommend the following:    Based on physical exam and radiology studies I do not see a source of MRSA bacteremia.    Unclear significance of positive blood cultures certainly MRSA in the blood needs to be treated as if it is real.    Patient clearly had nausea " vomiting and diarrhea in the norovirus makes sense to explain the gastro enteritis presentation    Sometimes if patients are mid to the hospital they can have peripheral IVs infiltrated could have a skin soft tissue phlebitis which can lead to bacteremia.    Cover for MRSA with vancomycin per pharmacy dosing    And for area under the cover 400 600    Continue supportive care and appropriate isolation for gastroenteritis from norovirus    Apply warm compresses to left arm elevate left arm    Follow bl cx  Mariano Mancuso MD  9/21/2024  15:29 EDT

## 2024-09-21 NOTE — PLAN OF CARE
Goal Outcome Evaluation:              Outcome Evaluation: INCREASED DISTANCE AMBULATED BUT RELIES HEAVILY ON B UE SUPPORT  AND UNSTEADY DESPITE R WALKER FOR SUPPORT. NEEDS CUES FOR SEQUENCING MOBILITY FOR SAFETY AND REQUIRES MANUAL ASSIST TO GUIDE R WALKER. C/O DIZZINESS AT END OF SESSION. /74, DECREASED FROM EARLIER IN DAY. RECOMMEND IRF AT D/C.      Anticipated Discharge Disposition (PT): inpatient rehabilitation facility

## 2024-09-21 NOTE — PROGRESS NOTES
"Pharmacy Consult - Vancomycin Dosing and Monitoring    Orestes Garcia Jr. is a 84 y.o. male receiving vancomycin therapy.     Indication: bacteremia  Consulting Provider: Jonel STAPLES Consult: no  Goal AUC: 400-600 mg/L*hr    Current Antimicrobial Therapy  Anti-Infectives (From admission, onward)      Ordered     Dose/Rate Route Frequency Start Stop    09/19/24 0957  Vancomycin HCl 1,250 mg in sodium chloride 0.9 % 250 mL VTB        Ordering Provider: Tod Hodges RPH    1,250 mg  200 mL/hr over 75 Minutes Intravenous Every 24 Hours 09/20/24 0900 09/27/24 0859    09/19/24 0957  vancomycin IVPB 1750 mg in 0.9% Sodium Chloride (premix) 500 mL        Ordering Provider: Tod Hodges RPH    1,750 mg  285.7 mL/hr over 105 Minutes Intravenous Once 09/19/24 1045 09/19/24 1247    09/19/24 0938  Pharmacy to dose vancomycin        Ordering Provider: Jonel Eduardo MD     Does not apply Continuous PRN 09/19/24 0937 09/26/24 0936          Allergies  Allergies as of 09/17/2024    (No Known Allergies)     Labs  Results from last 7 days   Lab Units 09/21/24  0810 09/19/24  0629 09/18/24  0540   BUN mg/dL 13 14 20   CREATININE mg/dL 1.19 1.20 1.35*     Results from last 7 days   Lab Units 09/18/24  0540 09/17/24  2121   WBC 10*3/mm3 7.54 9.73     Evaluation of Dosing     Last Dose Received in the ED/Outside Facility: no  Is Patient on Dialysis or Renal Replacement: no    Height - 177.8 cm (70\")  Weight - 87.5 kg (193 lb)    Estimated Creatinine Clearance: 55.1 mL/min (by C-G formula based on SCr of 1.19 mg/dL).    I/O last 3 completed shifts:  In: -   Out: 1700 [Urine:1700]    Microbiology and Radiology  Microbiology Results (last 10 days)       Procedure Component Value - Date/Time    Gastrointestinal Panel, PCR - Stool, Per Rectum [[id number]]  (Abnormal) Collected: 09/17/24 2341    Lab Status: Final result Specimen: Stool from Per Rectum Updated: 09/18/24 0844     Campylobacter Not Detected     Plesiomonas " shigelloides Not Detected     Salmonella Not Detected     Vibrio Not Detected     Vibrio cholerae Not Detected     Yersinia enterocolitica Not Detected     Enteroaggregative E. coli (EAEC) Not Detected     Enteropathogenic E. coli (EPEC) Not Detected     Enterotoxigenic E. coli (ETEC) lt/st Not Detected     Shiga-like toxin-producing E. coli (STEC) stx1/stx2 Not Detected     Shigella/Enteroinvasive E. coli (EIEC) Not Detected     Cryptosporidium Not Detected     Cyclospora cayetanensis Not Detected     Entamoeba histolytica Not Detected     Giardia lamblia Not Detected     Adenovirus F40/41 Not Detected     Astrovirus Not Detected     Norovirus GI/GII Detected     Comment: If a positive Norovirus result is inconsistent with clinical presentation, the positive Norovirus result should be confirmed using another method.        Rotavirus A Not Detected     Sapovirus (I, II, IV or V) Not Detected    Clostridioides difficile Toxin - Stool, Per Rectum [[id number]]  (Normal) Collected: 09/17/24 2341    Lab Status: Final result Specimen: Stool from Per Rectum Updated: 09/18/24 0758    Narrative:      The following orders were created for panel order Clostridioides difficile Toxin - Stool, Per Rectum.  Procedure                               Abnormality         Status                     ---------                               -----------         ------                     Clostridioides difficile...[[id number]]  Normal              Final result                 Please view results for these tests on the individual orders.    Clostridioides difficile Toxin, PCR - Stool, Per Rectum [[id number]]  (Normal) Collected: 09/17/24 2341    Lab Status: Final result Specimen: Stool from Per Rectum Updated: 09/18/24 0758     Toxigenic C. difficile by PCR Not Detected    Narrative:      The result indicates the absence of toxigenic C. difficile from stool specimen.     Blood Culture - Blood, Hand, Left [[id number]]  (Abnormal) Collected:  09/17/24 2140    Lab Status: Preliminary result Specimen: Blood from Hand, Left Updated: 09/21/24 0629     Blood Culture Staphylococcus aureus, MRSA     Comment:   Infectious disease consultation is highly recommended to rule out distant foci of infection.  Methicillin resistant Staphylococcus aureus, Patient may be an isolation risk.        Isolated from Anaerobic Bottle     Gram Stain Anaerobic Bottle Gram positive cocci in pairs and clusters    Narrative:      Less than seven (7) mL's of blood was collected.  Insufficient quantity may yield false negative results.    Blood Culture ID, PCR - Blood, Hand, Left [[id number]]  (Abnormal) Collected: 09/17/24 2140    Lab Status: Final result Specimen: Blood from Hand, Left Updated: 09/19/24 0815     BCID, PCR Staph aureus. mecA/C and MREJ (methicillin resistance gene) detected. Identification by BCID2 PCR.     BOTTLE TYPE Anaerobic Bottle    Narrative:      Infectious disease consultation is highly recommended to rule out distant foci of infection.    Blood Culture - Blood, Arm, Left [[id number]]  (Normal) Collected: 09/17/24 2130    Lab Status: Preliminary result Specimen: Blood from Arm, Left Updated: 09/21/24 0030     Blood Culture No growth at 3 days    Narrative:      Less than seven (7) mL's of blood was collected.  Insufficient quantity may yield false negative results.    COVID PRE-OP / PRE-PROCEDURE SCREENING ORDER (NO ISOLATION) - Swab, Nasopharynx [[id number]]  (Normal) Collected: 09/17/24 2126    Lab Status: Final result Specimen: Swab from Nasopharynx Updated: 09/17/24 2236    Narrative:      The following orders were created for panel order COVID PRE-OP / PRE-PROCEDURE SCREENING ORDER (NO ISOLATION) - Swab, Nasopharynx.  Procedure                               Abnormality         Status                     ---------                               -----------         ------                     Respiratory Panel PCR w/...[[id number]]  Normal              Final  result                 Please view results for these tests on the individual orders.    Respiratory Panel PCR w/COVID-19(SARS-CoV-2) BRIGID/ONI/LICO/PAD/COR/ADA In-House, NP Swab in UTM/VTM, 2 HR TAT - Swab, Nasopharynx [[id number]]  (Normal) Collected: 09/17/24 2126    Lab Status: Final result Specimen: Swab from Nasopharynx Updated: 09/17/24 2236     ADENOVIRUS, PCR Not Detected     Coronavirus 229E Not Detected     Coronavirus HKU1 Not Detected     Coronavirus NL63 Not Detected     Coronavirus OC43 Not Detected     COVID19 Not Detected     Human Metapneumovirus Not Detected     Human Rhinovirus/Enterovirus Not Detected     Influenza A PCR Not Detected     Influenza B PCR Not Detected     Parainfluenza Virus 1 Not Detected     Parainfluenza Virus 2 Not Detected     Parainfluenza Virus 3 Not Detected     Parainfluenza Virus 4 Not Detected     RSV, PCR Not Detected     Bordetella pertussis pcr Not Detected     Bordetella parapertussis PCR Not Detected     Chlamydophila pneumoniae PCR Not Detected     Mycoplasma pneumo by PCR Not Detected    Narrative:      In the setting of a positive respiratory panel with a viral infection PLUS a negative procalcitonin without other underlying concern for bacterial infection, consider observing off antibiotics or discontinuation of antibiotics and continue supportive care. If the respiratory panel is positive for atypical bacterial infection (Bordetella pertussis, Chlamydophila pneumoniae, or Mycoplasma pneumoniae), consider antibiotic de-escalation to target atypical bacterial infection.          Reported Vancomycin Levels    Results from last 7 days   Lab Units 09/21/24  0810   VANCOMYCIN RM mcg/mL 10.00       [employer] AUC Calculation:    Current AUC: 344 mg/L*hr    Predicted Steady State AUC on Current Dose:   431 mg/L*hr  _________________________________    Predicted Steady State AUC on New Dose: 513 mg/L*hr    Assessment/Plan:    Pharmacy consulted to dose vancomycin with  indication of MRSA bacteremia.  Goal AUC is 400-600.  Patient received a bolus vancomycin 1750 mg IV x1 (20 mg/kg) on 9/19 at 1101.    Current maintenance regimen of vancomycin 1250 (14.3 mg/kg) IV q24h   Vancomycin random level ~22 hrs post dose was 10 mcg/mL, corresponding to predicted AUCss of 431 mg/L*hr  For MRSA bacteremia, recommend increase to vancomycin 1500 mg IV q24h (predicted AUCss of 513 mg/L*hr (17.1 mg/kg)  Will plan to recheck vancomycin level in 2 to 3 days, sooner if indicated  Pharmacy will continue to follow cultures, renal function, clinical status and will adjust as needed for optimal therapy.      Rocio Schmidt, PharmD  9/21/2024  08:51 EDT

## 2024-09-21 NOTE — PROGRESS NOTES
[facility]  PROGRESS NOTE    Patient Name: Orestes Garcia Jr.  : 1940  MRN: [id number]    Date of Admission: 2024  Primary Care Physician: Jose Alberto Vang MD    Subjective   Subjective     CC: Follow-up MRSA bacteremia, generalized weakness    HPI:Patient seen earlier this morning, was sound asleep, did not awaken     Objective   Objective     Vital Signs:   Temp:  [98.1 °F (36.7 °C)-99 °F (37.2 °C)] 99 °F (37.2 °C)  Heart Rate:  [54-66] 54  Resp:  [16-18] 17  BP: (146-195)/(75-97) 168/93  Flow (L/min):  [2] 2     Physical Exam:  Constitutional: Elderly male, resting  HENT: NCAT, mucous membranes dry  Respiratory: Nonlabored  Cardiovascular: RRR, on tele  Gastrointestinal: Nondistended  Psychiatric: [place]  Neurologic: [place]      Results Reviewed:  LAB RESULTS:      Lab 24  0540 24  0030 24   WBC 7.54  --  9.73   HEMOGLOBIN 12.6*  --  13.2   HEMATOCRIT 37.8  --  40.4   PLATELETS 123*  --  125*   NEUTROS ABS 5.51  --  8.14*   IMMATURE GRANS (ABS) 0.04  --  0.03   LYMPHS ABS 0.84  --  0.53*   MONOS ABS 1.07*  --  0.94*   EOS ABS 0.06  --  0.07   MCV 90.6  --  92.4   LACTATE  --  1.5 2.4*         Lab 24  0629 24  0540 24   SODIUM 139 142 139   POTASSIUM 3.8 3.9 4.1   CHLORIDE 106 111* 106   CO2 23.0 21.0* 22.0   ANION GAP 10.0 10.0 11.0   BUN 14 20 23   CREATININE 1.20 1.35* 1.44*   EGFR 58.9* 51.1* 47.3*   GLUCOSE 81 130* 199*   CALCIUM 8.1* 7.6* 8.2*   MAGNESIUM  --  2.0  --    HEMOGLOBIN A1C  --  7.50*  --          Lab 24   TOTAL PROTEIN 6.4   ALBUMIN 3.6   GLOBULIN 2.8   ALT (SGPT) 28   AST (SGOT) 47*   BILIRUBIN 0.6   ALK PHOS 85   LIPASE 108*         Lab 24   HSTROP T 22*                 Brief Urine Lab Results  (Last result in the past 365 days)        Color   Clarity   Blood   Leuk Est   Nitrite   Protein   CREAT   Urine HCG        24 Yellow   Cloudy   Trace   Negative    Negative   30 mg/dL (1+)                   Microbiology Results Abnormal       Procedure Component Value - Date/Time    Blood Culture - Blood, Arm, Left [[id number]]  (Normal) Collected: 09/17/24 2130    Lab Status: Preliminary result Specimen: Blood from Arm, Left Updated: 09/21/24 0030     Blood Culture No growth at 3 days    Narrative:      Less than seven (7) mL's of blood was collected.  Insufficient quantity may yield false negative results.    Clostridioides difficile Toxin - Stool, Per Rectum [[id number]]  (Normal) Collected: 09/17/24 2341    Lab Status: Final result Specimen: Stool from Per Rectum Updated: 09/18/24 0758    Narrative:      The following orders were created for panel order Clostridioides difficile Toxin - Stool, Per Rectum.  Procedure                               Abnormality         Status                     ---------                               -----------         ------                     Clostridioides difficile...[[id number]]  Normal              Final result                 Please view results for these tests on the individual orders.    Clostridioides difficile Toxin, PCR - Stool, Per Rectum [[id number]]  (Normal) Collected: 09/17/24 2341    Lab Status: Final result Specimen: Stool from Per Rectum Updated: 09/18/24 0758     Toxigenic C. difficile by PCR Not Detected    Narrative:      The result indicates the absence of toxigenic C. difficile from stool specimen.     COVID PRE-OP / PRE-PROCEDURE SCREENING ORDER (NO ISOLATION) - Swab, Nasopharynx [[id number]]  (Normal) Collected: 09/17/24 2126    Lab Status: Final result Specimen: Swab from Nasopharynx Updated: 09/17/24 2236    Narrative:      The following orders were created for panel order COVID PRE-OP / PRE-PROCEDURE SCREENING ORDER (NO ISOLATION) - Swab, Nasopharynx.  Procedure                               Abnormality         Status                     ---------                               -----------         ------                      Respiratory Panel PCR w/...[[id number]]  Normal              Final result                 Please view results for these tests on the individual orders.    Respiratory Panel PCR w/COVID-19(SARS-CoV-2) BRIGID/ONI/LICO/PAD/COR/ADA In-House, NP Swab in UTM/VTM, 2 HR TAT - Swab, Nasopharynx [[id number]]  (Normal) Collected: 09/17/24 2126    Lab Status: Final result Specimen: Swab from Nasopharynx Updated: 09/17/24 2236     ADENOVIRUS, PCR Not Detected     Coronavirus 229E Not Detected     Coronavirus HKU1 Not Detected     Coronavirus NL63 Not Detected     Coronavirus OC43 Not Detected     COVID19 Not Detected     Human Metapneumovirus Not Detected     Human Rhinovirus/Enterovirus Not Detected     Influenza A PCR Not Detected     Influenza B PCR Not Detected     Parainfluenza Virus 1 Not Detected     Parainfluenza Virus 2 Not Detected     Parainfluenza Virus 3 Not Detected     Parainfluenza Virus 4 Not Detected     RSV, PCR Not Detected     Bordetella pertussis pcr Not Detected     Bordetella parapertussis PCR Not Detected     Chlamydophila pneumoniae PCR Not Detected     Mycoplasma pneumo by PCR Not Detected    Narrative:      In the setting of a positive respiratory panel with a viral infection PLUS a negative procalcitonin without other underlying concern for bacterial infection, consider observing off antibiotics or discontinuation of antibiotics and continue supportive care. If the respiratory panel is positive for atypical bacterial infection (Bordetella pertussis, Chlamydophila pneumoniae, or Mycoplasma pneumoniae), consider antibiotic de-escalation to target atypical bacterial infection.            No radiology results from the last 24 hrs    Results for orders placed in visit on 07/20/22    Adult Transthoracic Echo Complete W/ Cont if Necessary Per Protocol    Interpretation Summary  · Estimated left ventricular EF = 55% Estimated left ventricular EF was in disagreement with the calculated left ventricular EF.  Left ventricular ejection fraction appears to be 56 - 60%. Left ventricular systolic function is normal.  · Left ventricular diastolic function is consistent with (grade II w/high LAP) pseudonormalization.  · Left ventricular wall thickness is consistent with mild concentric hypertrophy.  · Estimated right ventricular systolic pressure from tricuspid regurgitation is normal (<35 mmHg).  · Mild aortic sclerosis without obstruction  · The RV has normal size and function  · Trvial TR  · No pericardial effusion      Current medications:  Scheduled Meds:aspirin, 81 mg, Oral, Daily  atorvastatin, 20 mg, Oral, Nightly  busPIRone, 5 mg, Oral, BID  citalopram, 20 mg, Oral, Daily  clopidogrel, 75 mg, Oral, Daily  donepezil, 10 mg, Oral, Q PM  insulin lispro, 2-7 Units, Subcutaneous, 4x Daily AC & at Bedtime  lisinopril, 5 mg, Oral, Daily  senna-docusate sodium, 2 tablet, Oral, BID  sodium chloride, 10 mL, Intravenous, Q12H  vancomycin, 1,250 mg, Intravenous, Q24H      Continuous Infusions:Pharmacy to dose vancomycin,       PRN Meds:.  acetaminophen **OR** acetaminophen **OR** acetaminophen    senna-docusate sodium **AND** polyethylene glycol **AND** bisacodyl **AND** bisacodyl    dextrose    dextrose    glucagon (human recombinant)    hydrALAZINE    nitroglycerin    Pharmacy to dose vancomycin    Sodium Chloride (PF)    [COMPLETED] Insert Peripheral IV **AND** sodium chloride    sodium chloride    sodium chloride    traZODone    Assessment & Plan   Assessment & Plan     Active Hospital Problems    Diagnosis  POA    **Weakness [R53.1]  Yes    Norovirus [A08.11]  Yes    MRSA bacteremia [R78.81, B95.62]  Yes    Bacteremia [R78.81]  Yes    Dementia without behavioral disturbance [F03.90]  Yes    Hyperlipidemia LDL goal <70 [E78.5]  Yes    Carotid artery stenosis, asymptomatic, bilateral [I65.23]  Yes    Type 2 diabetes mellitus with stage 3a chronic kidney disease, without long-term current use of insulin [E11.22, N18.31]  Yes     Benign essential hypertension [I10]  Yes      Resolved Hospital Problems   No resolved problems to display.        Brief Hospital Course to date:  Orestes Garcia Jr. is a 84 y.o. male 84-year-old male history of type 2 diabetes, CKD stage III hypertension, hyperlipidemia, dementia carotid stenosis, recent admission at OSH for acute pancreatitis for 2 days, discharged home 9/17.  He presented to [facility] with generalized weakness  s/p fall, found to have norovirus and MRSA bacteremia     Norovirus infection  MRSA bacteremia  -Patient with fever Tmax 101.6 and lactic acidosis   -CT abd/pelvis is unremarkable, respiratory PCR with COVID is negative, C. difficile toxin was negative, GI PCR panel is + for Norovirus  -Blood cultures, positive for MRSA,  ID consulted, etiology remains unknown, ?  Thrombophlebitis as patient had some infiltration of his IV on the left cubital fossa, continue vancomycin for now  -Continue supportive therapy .     Well-controlled type 2 diabetes A1c 7.5%  -Continue SSI     Hypertension  -BP stable continue lisinopril     Bilateral carotid artery disease  Hyperlipidemia  -Continue statin, aspirin and Plavix  -Follows with Dr. Wilkerson     Dementia  -Continue Aricept     Generalized weakness  Fall  -Suspect overall deconditioning from recent hospitalization  -PT/OT following, recommend rehab    Expected Discharge Location and Transportation: Rehab, [facility] pending insurance  Expected Discharge   Expected Discharge Date: 9/23/2024; Expected Discharge Time:      VTE Prophylaxis:  Mechanical VTE prophylaxis orders are present.       AM-PAC 6 Clicks Score (PT): 17 (09/21/24 0600)    CODE STATUS:   Code Status and Medical Interventions: CPR (Attempt to Resuscitate); Full Support   Ordered at: 09/18/24 0105     Level Of Support Discussed With:    Next of Kin (If No Surrogate)     Code Status (Patient has no pulse and is not breathing):    CPR (Attempt to Resuscitate)     Medical Interventions (Patient  has pulse or is breathing):    Full Support       Jonel Eduardo MD  09/21/24

## 2024-09-21 NOTE — THERAPY TREATMENT NOTE
Patient Name: Orestes Garcia Jr.  : 1940    MRN: [id number]                              Today's Date: 2024       Admit Date: 2024    Visit Dx:     ICD-10-CM ICD-9-CM   1. Nausea and vomiting in adult  R11.2 787.01   2. Fever, unspecified fever cause  R50.9 780.60   3. Weakness  R53.1 780.79   4. Acute confusion  R41.0 293.0     Patient Active Problem List   Diagnosis    Carotid artery stenosis, asymptomatic, bilateral    Hyperlipidemia LDL goal <70    Type 2 diabetes mellitus with stage 3a chronic kidney disease, without long-term current use of insulin    Encounter for long-term (current) use of other medications    Benign essential hypertension    Irritable bowel syndrome with diarrhea    High risk medication use    Diverticular disease of colon    Dementia without behavioral disturbance    Major depressive disorder with single episode, in partial remission    Situational anxiety    Insomnia due to anxiety and fear    DEEP (generalized anxiety disorder)    Weakness    Norovirus    MRSA bacteremia    Bacteremia     Past Medical History:   Diagnosis Date    Anemia     Benign essential HTN     Carotid artery stenosis 2020    RIGHT CAROTID  40-60% STENOSIS ON DOPPLER RECORD SAYS H/O TIA BUT PT DENIES THIS??    CKD (chronic kidney disease), stage III     Colon polyp 2012    LAST C QUESTIONABLE    Condition not found 2018    COLOGUARD NEG 9-18    Dementia     Depression     Diverticular disease of colon     WITHOUT HEMORRHAGE    Diverticulosis     WITH LOWR GI BLEED APPROS AGE 49    Hearing loss     High risk medication use     IBS (irritable bowel syndrome)     WITH DIARRHEA    Mixed hyperlipidemia     Overweight (BMI 25.0-29.9)     Skin cancer     LEG    Transient cerebral ischemia 2019    POSSIBLE A/W FALL    Type 2 diabetes mellitus     WITH STAGE 3 CKD WITHOUT LONG TERM CURRENT USE OF INSULIN   WITH POTEINURIA     Past Surgical History:   Procedure Laterality Date    CATARACT EXTRACTION       COLONOSCOPY  2012    [facility]    INTERVENTIONAL RADIOLOGY PROCEDURE Bilateral 8/31/2022    Procedure: Carotid Cerebral Angiogram;  Surgeon: Stan Falk MD;  Location: [facility] INVASIVE LOCATION;  Service: Interventional Radiology;  Laterality: Bilateral;    SKIN BIOPSY Right 2016    LEG    TONSILLECTOMY  1950      General Information       Row Name 09/21/24 1558          Physical Therapy Time and Intention    Document Type therapy note (daily note)  -CD     Mode of Treatment physical therapy  -CD       Row Name 09/21/24 1558          General Information    Patient Profile Reviewed yes  -CD     Existing Precautions/Restrictions fall  [place]  -CD     Barriers to Rehab hearing deficit  -CD       Row Name 09/21/24 1558          Cognition    Orientation Status (Cognition) oriented x 3  -CD       Row Name 09/21/24 1558          Safety Issues, Functional Mobility    Safety Issues Affecting Function (Mobility) insight into deficits/self-awareness;awareness of need for assistance;ability to follow commands;safety precautions follow-through/compliance;safety precaution awareness;positioning of assistive device;sequencing abilities  -CD     Impairments Affecting Function (Mobility) balance;endurance/activity tolerance;shortness of breath;strength  -CD     Comment, Safety Issues/Impairments (Mobility) VERY [place], NEEDS CUES FOR SEQUENCING MOBILITY SAFETY. GAIT UNSTEADY DESPITE ASSIST OF R WALKER.  -CD               User Key  (r) = Recorded By, (t) = Taken By, (c) = Cosigned By      Initials Name Provider Type    CD Mercedez Willard PT Physical Therapist                   Mobility       Row Name 09/21/24 1600          Bed Mobility    Bed Mobility supine-sit  -CD     Supine-Sit [place] (Bed Mobility) contact guard  -CD     Assistive Device (Bed Mobility) bed rails;head of bed elevated  -CD     Comment, (Bed Mobility) NO LOB UPON SITTING EOB.  -CD       Row Name 09/21/24 1600          Transfers    Comment, (Transfers) CUES FOR  HAND PLACEMENT. STS FROM EOB AND FROM COMMODE X 2 REPS. CUES TO UTILIZE GRAB BAR IN BATHROOM. PT DEPENDENT WITH HYGIENE AFTER BM. C/O DIZZINESS SO RECLINER GABRIELA TO BATHROOM DOOR.  -CD       Row Name 09/21/24 1600          Sit-Stand Transfer    Sit-Stand [place] (Transfers) contact guard  -CD     Assistive Device (Sit-Stand Transfers) walker, front-wheeled  -CD       Row Name 09/21/24 1600          Gait/Stairs (Locomotion)    [place] Level (Gait) minimum assist (75% patient effort)  -CD     Assistive Device (Gait) walker, front-wheeled  -CD     Distance in Feet (Gait) 60  60 + 60 + 5  -CD     Deviations/Abnormal Patterns (Gait) festinating/shuffling;gait speed decreased;stride length decreased;weight shifting decreased  -CD     Bilateral Gait Deviations heel strike decreased;forward flexed posture  -CD     Comment, (Gait/Stairs) PT DEMONSTRATES SHUFFLING GAIT PATTERN AND NEEDS MANUAL ASSIST TO GUIDE ROLLING WALKER ESPECIALLY WHEN NEGOTIATING TURNS. SEVERAL MINOR LOB DESPITE WALKER.  -CD               User Key  (r) = Recorded By, (t) = Taken By, (c) = Cosigned By      Initials Name Provider Type    CD Mercedez Willard, PT Physical Therapist                   Obj/Interventions       Row Name 09/21/24 1605          Motor Skills    Therapeutic Exercise --  COMPLETED SEATED B LE THER EX X 10 REPS EACH: HIP FLEX, LAQ, AP'S  -CD       Row Name 09/21/24 1605          Balance    Balance Assessment sitting static balance;sitting dynamic balance;sit to stand dynamic balance;standing static balance;standing dynamic balance  -CD     Static Sitting Balance standby assist  -CD     Dynamic Sitting Balance contact guard  -CD     Position, Sitting Balance unsupported;sitting edge of bed  -CD     Sit to Stand Dynamic Balance contact guard  -CD     Static Standing Balance contact guard  -CD     Dynamic Standing Balance minimal assist  -CD     Position/Device Used, Standing Balance walker, rolling  -CD     Comment, Balance VERY  UNSTEADY DESPITE R WALKER. MIN ASSIST FOR MOBILITY WITH WALKER.  -CD               User Key  (r) = Recorded By, (t) = Taken By, (c) = Cosigned By      Initials Name Provider Type    CD Mercedez Willard, PT Physical Therapist                   Goals/Plan    No documentation.                  Clinical Impression       Row Name 09/21/24 1608          Pain    Pretreatment Pain Rating 0/10 - no pain  -CD     Posttreatment Pain Rating 0/10 - no pain  -CD       Row Name 09/21/24 1608          Plan of Care Review    Outcome Evaluation INCREASED DISTANCE AMBULATED BUT RELIES HEAVILY ON B UE SUPPORT  AND UNSTEADY DESPITE R WALKER FOR SUPPORT. NEEDS CUES FOR SEQUENCING MOBILITY FOR SAFETY AND REQUIRES MANUAL ASSIST TO GUIDE R WALKER. C/O DIZZINESS AT END OF SESSION. /74, DECREASED FROM EARLIER IN DAY. RECOMMEND IRF AT D/C.  -CD       Row Name 09/21/24 1608          Therapy Assessment/Plan (PT)    Patient/Family Therapy Goals Statement (PT) TO RETURN TO PLOF.  -CD     Rehab Potential (PT) good, to achieve stated therapy goals  -CD     Criteria for Skilled Interventions Met (PT) yes;meets criteria;skilled treatment is necessary  -CD     Therapy Frequency (PT) daily  -CD       Row Name 09/21/24 1608          Vital Signs    Pre Systolic BP Rehab 125  -CD     Pre Treatment Diastolic BP 74  -CD     Post Systolic BP Rehab 127  -CD     Post Treatment Diastolic BP 75  -CD     Posttreatment Heart Rate (beats/min) 61  -CD     Pre SpO2 (%) 95  -CD     O2 Delivery Intra Treatment room air  -CD     Post SpO2 (%) 95  -CD     O2 Delivery Post Treatment room air  -CD     Pre Patient Position Supine  -CD     Intra Patient Position Standing  -CD     Post Patient Position Sitting  -CD       Row Name 09/21/24 1608          Positioning and Restraints    Pre-Treatment Position in bed  -CD     Post Treatment Position chair  -CD     In Chair reclined;call light within reach;encouraged to call for assist;exit alarm on;notified nsg;legs elevated   -CD               User Key  (r) = Recorded By, (t) = Taken By, (c) = Cosigned By      Initials Name Provider Type    CD Mercedez Willard, PT Physical Therapist                   Outcome Measures       Row Name 09/21/24 1612 09/21/24 0600       How much help from another person do you currently need...    Turning from your back to your side while in flat bed without using bedrails? 4  -CD 3  -LH    Moving from lying on back to sitting on the side of a flat bed without bedrails? 3  -CD 3  -LH    Moving to and from a bed to a chair (including a wheelchair)? 3  -CD 3  -LH    Standing up from a chair using your arms (e.g., wheelchair, bedside chair)? 3  -CD 3  -LH    Climbing 3-5 steps with a railing? 2  -CD 2  -LH    To walk in hospital room? 3  -CD 3  -LH    AM-PAC 6 Clicks Score (PT) 18  -CD 17  -LH    Highest Level of Mobility Goal 6 --> Walk 10 steps or more  -CD 5 --> Static standing  -              User Key  (r) = Recorded By, (t) = Taken By, (c) = Cosigned By      Initials Name Provider Type    Mercedez Rowe, PT Physical Therapist     Ngozi Estrada, RN Registered Nurse                                 Physical Therapy Education       Title: PT OT SLP Therapies (In Progress)       Topic: Physical Therapy (Done)       Point: Mobility training (Done)       Learning Progress Summary             Patient Acceptance, E,D, VU by CD at 9/21/2024 1614    Comment: SEE FLOWSHEET    Acceptance, E, VU by CK at 9/18/2024 1045                         Point: Home exercise program (Done)       Learning Progress Summary             Patient Acceptance, E,D, VU by CD at 9/21/2024 1614    Comment: SEE FLOWSHEET                         Point: Body mechanics (Done)       Learning Progress Summary             Patient Acceptance, E,D, VU by CD at 9/21/2024 1614    Comment: SEE FLOWSHEET    Acceptance, E, VU by CK at 9/18/2024 1045                         Point: Precautions (Done)       Learning Progress Summary             Patient  Acceptance, E,D, VU by CD at 9/21/2024 1614    Comment: SEE FLOWSHEET    Acceptance, E, VU by CK at 9/18/2024 1045                                         User Key       Initials Effective Dates Name Provider Type Discipline    CD 02/03/23 -  Mercedez Willard, PT Physical Therapist PT    CK 02/06/24 -  [place] PT Physical Therapist PT                  PT Recommendation and Plan     Outcome Evaluation: INCREASED DISTANCE AMBULATED BUT RELIES HEAVILY ON B UE SUPPORT  AND UNSTEADY DESPITE R WALKER FOR SUPPORT. NEEDS CUES FOR SEQUENCING MOBILITY FOR SAFETY AND REQUIRES MANUAL ASSIST TO GUIDE R WALKER. C/O DIZZINESS AT END OF SESSION. /74, DECREASED FROM EARLIER IN DAY. RECOMMEND IRF AT D/C.     Time Calculation:         PT Charges       Row Name 09/21/24 1615             Time Calculation    Start Time 1450  -CD      PT Received On 09/21/24  -CD         Time Calculation- PT    Total Timed Code Minutes- PT 55 minute(s)  -CD         Timed Charges    68494 - PT Therapeutic Exercise Minutes 10  -CD      26946 - Gait Training Minutes  15  -CD      67281 - PT Therapeutic Activity Minutes 30  -CD         Total Minutes    Timed Charges Total Minutes 55  -CD       Total Minutes 55  -CD                User Key  (r) = Recorded By, (t) = Taken By, (c) = Cosigned By      Initials Name Provider Type    CD Mercedez Willard, PT Physical Therapist                  Therapy Charges for Today       Code Description Service Date Service Provider Modifiers Qty    07559358966 HC PT THERAPEUTIC ACT EA 15 MIN 9/21/2024 Mercedez Willard, PT GP 2    24799586702 HC GAIT TRAINING EA 15 MIN 9/21/2024 Mercedez Willard, PT GP 1    14692767660 HC PT THER PROC EA 15 MIN 9/21/2024 Mercedez Willard, PT GP 1            PT G-Codes  Outcome Measure Options: AM-PAC 6 Clicks Basic Mobility (PT), AM-PAC 6 Clicks Daily Activity (OT)  AM-PAC 6 Clicks Score (PT): 18  AM-PAC 6 Clicks Score (OT): 17  PT Discharge Summary  Anticipated Discharge Disposition  (PT): inpatient rehabilitation facility    Mercedez Willard, PT  9/21/2024

## 2024-09-22 LAB
GLUCOSE BLDC GLUCOMTR-MCNC: 104 MG/DL (ref 70–130)
GLUCOSE BLDC GLUCOMTR-MCNC: 130 MG/DL (ref 70–130)
GLUCOSE BLDC GLUCOMTR-MCNC: 190 MG/DL (ref 70–130)
GLUCOSE BLDC GLUCOMTR-MCNC: 191 MG/DL (ref 70–130)

## 2024-09-22 PROCEDURE — 63710000001 INSULIN LISPRO (HUMAN) PER 5 UNITS: Performed by: NURSE PRACTITIONER

## 2024-09-22 PROCEDURE — 82948 REAGENT STRIP/BLOOD GLUCOSE: CPT

## 2024-09-22 PROCEDURE — 97535 SELF CARE MNGMENT TRAINING: CPT | Performed by: OCCUPATIONAL THERAPIST

## 2024-09-22 PROCEDURE — 25010000002 VANCOMYCIN HCL IN NACL 1.5-0.9 GM/500ML-% SOLUTION

## 2024-09-22 PROCEDURE — 99232 SBSQ HOSP IP/OBS MODERATE 35: CPT | Performed by: INTERNAL MEDICINE

## 2024-09-22 RX ADMIN — Medication 10 ML: at 20:24

## 2024-09-22 RX ADMIN — Medication 1500 MG: at 09:01

## 2024-09-22 RX ADMIN — CITALOPRAM HYDROBROMIDE 20 MG: 20 TABLET ORAL at 09:01

## 2024-09-22 RX ADMIN — ASPIRIN 81 MG: 81 TABLET, COATED ORAL at 09:00

## 2024-09-22 RX ADMIN — LISINOPRIL 5 MG: 5 TABLET ORAL at 09:00

## 2024-09-22 RX ADMIN — BUSPIRONE HYDROCHLORIDE 5 MG: 10 TABLET ORAL at 09:00

## 2024-09-22 RX ADMIN — Medication 10 ML: at 09:01

## 2024-09-22 RX ADMIN — CLOPIDOGREL BISULFATE 75 MG: 75 TABLET ORAL at 09:00

## 2024-09-22 RX ADMIN — SENNOSIDES AND DOCUSATE SODIUM 2 TABLET: 50; 8.6 TABLET ORAL at 09:00

## 2024-09-22 RX ADMIN — DONEPEZIL HYDROCHLORIDE 10 MG: 10 TABLET, FILM COATED ORAL at 20:24

## 2024-09-22 RX ADMIN — ATORVASTATIN CALCIUM 20 MG: 20 TABLET, FILM COATED ORAL at 20:23

## 2024-09-22 RX ADMIN — INSULIN LISPRO 2 UNITS: 100 INJECTION, SOLUTION INTRAVENOUS; SUBCUTANEOUS at 12:18

## 2024-09-22 RX ADMIN — BUSPIRONE HYDROCHLORIDE 5 MG: 10 TABLET ORAL at 20:24

## 2024-09-22 NOTE — PROGRESS NOTES
[facility] Medicine Services  PROGRESS NOTE    Patient Name: Orestes Garcia Jr.  : 1940  MRN: [id number]    Date of Admission: 2024  Primary Care Physician: Jose Alberto Vang MD    Subjective   Subjective     CC: Follow-up MRSA bacteremia, generalized weakness    HPI: Patient doing well, feels much better this morning      Objective   Objective     Vital Signs:   Temp:  [98.1 °F (36.7 °C)-99.2 °F (37.3 °C)] 98.9 °F (37.2 °C)  Heart Rate:  [47-74] 47  Resp:  [17-18] 17  BP: (127-177)/(73-95) 159/73  Flow (L/min):  [2] 2     Physical Exam:  Constitutional: Elderly male, in no acute distress  HENT: NCAT, mucous membranes moist  Respiratory: Nonlabored respirations  Cardiovascular: RRR, no murmurs, rubs, or gallops  Gastrointestinal: Nontender, nondistended soft  Musculoskeletal: No bilateral ankle edema  Psychiatric: Appropriate affect, cooperative  Neurologic: Nonfocal, hard of hearing  Skin: No rashes, erythema with some induration on the left cubital fossa      Results Reviewed:  LAB RESULTS:      Lab 24  0540 24  0030 24   WBC 7.54  --  9.73   HEMOGLOBIN 12.6*  --  13.2   HEMATOCRIT 37.8  --  40.4   PLATELETS 123*  --  125*   NEUTROS ABS 5.51  --  8.14*   IMMATURE GRANS (ABS) 0.04  --  0.03   LYMPHS ABS 0.84  --  0.53*   MONOS ABS 1.07*  --  0.94*   EOS ABS 0.06  --  0.07   MCV 90.6  --  92.4   LACTATE  --  1.5 2.4*         Lab 24  0810 24  0629 24  0540 24   SODIUM 139 139 142 139   POTASSIUM 3.6 3.8 3.9 4.1   CHLORIDE 104 106 111* 106   CO2 25.0 23.0 21.0* 22.0   ANION GAP 10.0 10.0 10.0 11.0   BUN 13 14 20 23   CREATININE 1.19 1.20 1.35* 1.44*   EGFR 59.5* 58.9* 51.1* 47.3*   GLUCOSE 118* 81 130* 199*   CALCIUM 8.8 8.1* 7.6* 8.2*   MAGNESIUM  --   --  2.0  --    HEMOGLOBIN A1C  --   --  7.50*  --          Lab 24  2121   TOTAL PROTEIN 6.4   ALBUMIN 3.6   GLOBULIN 2.8   ALT (SGPT) 28   AST (SGOT) 47*   BILIRUBIN 0.6    ALK PHOS 85   LIPASE 108*         Lab 09/17/24 2146   HSTROP T 22*                 Brief Urine Lab Results  (Last result in the past 365 days)        Color   Clarity   Blood   Leuk Est   Nitrite   Protein   CREAT   Urine HCG        09/17/24 2132 Yellow   Cloudy   Trace   Negative   Negative   30 mg/dL (1+)                   Microbiology Results Abnormal       Procedure Component Value - Date/Time    Blood Culture - Blood, Arm, Left [[id number]]  (Normal) Collected: 09/17/24 2130    Lab Status: Preliminary result Specimen: Blood from Arm, Left Updated: 09/22/24 0030     Blood Culture No growth at 4 days    Narrative:      Less than seven (7) mL's of blood was collected.  Insufficient quantity may yield false negative results.    Clostridioides difficile Toxin - Stool, Per Rectum [[id number]]  (Normal) Collected: 09/17/24 2341    Lab Status: Final result Specimen: Stool from Per Rectum Updated: 09/18/24 0758    Narrative:      The following orders were created for panel order Clostridioides difficile Toxin - Stool, Per Rectum.  Procedure                               Abnormality         Status                     ---------                               -----------         ------                     Clostridioides difficile...[[id number]]  Normal              Final result                 Please view results for these tests on the individual orders.    Clostridioides difficile Toxin, PCR - Stool, Per Rectum [[id number]]  (Normal) Collected: 09/17/24 2341    Lab Status: Final result Specimen: Stool from Per Rectum Updated: 09/18/24 0758     Toxigenic C. difficile by PCR Not Detected    Narrative:      The result indicates the absence of toxigenic C. difficile from stool specimen.     COVID PRE-OP / PRE-PROCEDURE SCREENING ORDER (NO ISOLATION) - Swab, Nasopharynx [[id number]]  (Normal) Collected: 09/17/24 2126    Lab Status: Final result Specimen: Swab from Nasopharynx Updated: 09/17/24 2236    Narrative:      The following  orders were created for panel order COVID PRE-OP / PRE-PROCEDURE SCREENING ORDER (NO ISOLATION) - Swab, Nasopharynx.  Procedure                               Abnormality         Status                     ---------                               -----------         ------                     Respiratory Panel PCR w/...[[id number]]  Normal              Final result                 Please view results for these tests on the individual orders.    Respiratory Panel PCR w/COVID-19(SARS-CoV-2) BRIGID/ONI/LICO/PAD/COR/ADA In-House, NP Swab in UTM/VTM, 2 HR TAT - Swab, Nasopharynx [[id number]]  (Normal) Collected: 09/17/24 2126    Lab Status: Final result Specimen: Swab from Nasopharynx Updated: 09/17/24 2236     ADENOVIRUS, PCR Not Detected     Coronavirus 229E Not Detected     Coronavirus HKU1 Not Detected     Coronavirus NL63 Not Detected     Coronavirus OC43 Not Detected     COVID19 Not Detected     Human Metapneumovirus Not Detected     Human Rhinovirus/Enterovirus Not Detected     Influenza A PCR Not Detected     Influenza B PCR Not Detected     Parainfluenza Virus 1 Not Detected     Parainfluenza Virus 2 Not Detected     Parainfluenza Virus 3 Not Detected     Parainfluenza Virus 4 Not Detected     RSV, PCR Not Detected     Bordetella pertussis pcr Not Detected     Bordetella parapertussis PCR Not Detected     Chlamydophila pneumoniae PCR Not Detected     Mycoplasma pneumo by PCR Not Detected    Narrative:      In the setting of a positive respiratory panel with a viral infection PLUS a negative procalcitonin without other underlying concern for bacterial infection, consider observing off antibiotics or discontinuation of antibiotics and continue supportive care. If the respiratory panel is positive for atypical bacterial infection (Bordetella pertussis, Chlamydophila pneumoniae, or Mycoplasma pneumoniae), consider antibiotic de-escalation to target atypical bacterial infection.            No radiology results from the last  24 hrs    Results for orders placed in visit on 07/20/22    Adult Transthoracic Echo Complete W/ Cont if Necessary Per Protocol    Interpretation Summary  · Estimated left ventricular EF = 55% Estimated left ventricular EF was in disagreement with the calculated left ventricular EF. Left ventricular ejection fraction appears to be 56 - 60%. Left ventricular systolic function is normal.  · Left ventricular diastolic function is consistent with (grade II w/high LAP) pseudonormalization.  · Left ventricular wall thickness is consistent with mild concentric hypertrophy.  · Estimated right ventricular systolic pressure from tricuspid regurgitation is normal (<35 mmHg).  · Mild aortic sclerosis without obstruction  · The RV has normal size and function  · Trvial TR  · No pericardial effusion      Current medications:  Scheduled Meds:aspirin, 81 mg, Oral, Daily  atorvastatin, 20 mg, Oral, Nightly  busPIRone, 5 mg, Oral, BID  citalopram, 20 mg, Oral, Daily  clopidogrel, 75 mg, Oral, Daily  donepezil, 10 mg, Oral, Q PM  insulin lispro, 2-7 Units, Subcutaneous, 4x Daily AC & at Bedtime  lisinopril, 5 mg, Oral, Daily  senna-docusate sodium, 2 tablet, Oral, BID  sodium chloride, 10 mL, Intravenous, Q12H  vancomycin, 1,500 mg, Intravenous, Q24H      Continuous Infusions:Pharmacy to dose vancomycin,       PRN Meds:.  acetaminophen **OR** acetaminophen **OR** acetaminophen    senna-docusate sodium **AND** polyethylene glycol **AND** bisacodyl **AND** bisacodyl    dextrose    dextrose    glucagon (human recombinant)    hydrALAZINE    nitroglycerin    Pharmacy to dose vancomycin    Sodium Chloride (PF)    [COMPLETED] Insert Peripheral IV **AND** sodium chloride    sodium chloride    sodium chloride    traZODone    Assessment & Plan   Assessment & Plan     Active Hospital Problems    Diagnosis  POA    **Weakness [R53.1]  Yes    Norovirus [A08.11]  Yes    MRSA bacteremia [R78.81, B95.62]  Yes    Bacteremia [R78.81]  Yes    Dementia  without behavioral disturbance [F03.90]  Yes    Hyperlipidemia LDL goal <70 [E78.5]  Yes    Carotid artery stenosis, asymptomatic, bilateral [I65.23]  Yes    Type 2 diabetes mellitus with stage 3a chronic kidney disease, without long-term current use of insulin [E11.22, N18.31]  Yes    Benign essential hypertension [I10]  Yes      Resolved Hospital Problems   No resolved problems to display.        Brief Hospital Course to date:  Orestes Garcia Jr. is a 84 y.o. male 84-year-old male history of type 2 diabetes, CKD stage III hypertension, hyperlipidemia, dementia carotid stenosis, recent admission at OSH for acute pancreatitis for 2 days, discharged home 9/17.  He presented to [facility] with generalized weakness  s/p fall, found to have norovirus and MRSA bacteremia     Norovirus infection  MRSA bacteremia  -Patient with fever Tmax 101.6 and lactic acidosis   -CT abd/pelvis is unremarkable, respiratory PCR with COVID is negative, C. difficile toxin was negative, GI PCR panel is + for Norovirus  -Blood cultures, positive for MRSA,  ID consulted, etiology remains unknown, ?  Thrombophlebitis as patient had some infiltration of his IV on the left cubital fossa, continue vancomycin for now  -Continue supportive therapy .     Well-controlled type 2 diabetes A1c 7.5%  -Continue SSI     Hypertension  -BP stable continue lisinopril     Bilateral carotid artery disease  Hyperlipidemia  -Continue statin, aspirin and Plavix  -Follows with Dr. Rock Kelly  -Continue Aricept     Generalized weakness  Fall  -Suspect overall deconditioning from recent hospitalization  -PT/OT following, recommend rehab, awaiting insurance approval     Expected Discharge Location and Transportation: Rehab  Expected Discharge   Expected Discharge Date: 9/23/2024; Expected Discharge Time:      VTE Prophylaxis:  Mechanical VTE prophylaxis orders are present.         AM-PAC 6 Clicks Score (PT): 17 (09/21/24 2035)    CODE STATUS:   Code Status and  Medical Interventions: CPR (Attempt to Resuscitate); Full Support   Ordered at: 09/18/24 0105     Level Of Support Discussed With:    Next of Kin (If No Surrogate)     Code Status (Patient has no pulse and is not breathing):    CPR (Attempt to Resuscitate)     Medical Interventions (Patient has pulse or is breathing):    Full Support       Jonel Eduardo MD  09/22/24

## 2024-09-22 NOTE — PLAN OF CARE
Goal Outcome Evaluation:  Plan of Care Reviewed With: patient        Progress: improving  Outcome Evaluation: Pt completed bed mobility with CGA, UB dressing with min assist and transfer training with min assist. Pt limited with generalized weakness, decreased occupational endurance, decreased balance and is performing below baseline status. Recommend IPR.      Anticipated Discharge Disposition (OT): inpatient rehabilitation facility

## 2024-09-22 NOTE — THERAPY TREATMENT NOTE
Patient Name: Orestes Garcia Jr.  : 1940    MRN: [id number]                              Today's Date: 2024       Admit Date: 2024    Visit Dx:     ICD-10-CM ICD-9-CM   1. Nausea and vomiting in adult  R11.2 787.01   2. Fever, unspecified fever cause  R50.9 780.60   3. Weakness  R53.1 780.79   4. Acute confusion  R41.0 293.0     Patient Active Problem List   Diagnosis    Carotid artery stenosis, asymptomatic, bilateral    Hyperlipidemia LDL goal <70    Type 2 diabetes mellitus with stage 3a chronic kidney disease, without long-term current use of insulin    Encounter for long-term (current) use of other medications    Benign essential hypertension    Irritable bowel syndrome with diarrhea    High risk medication use    Diverticular disease of colon    Dementia without behavioral disturbance    Major depressive disorder with single episode, in partial remission    Situational anxiety    Insomnia due to anxiety and fear    DEEP (generalized anxiety disorder)    Weakness    Norovirus    MRSA bacteremia    Bacteremia     Past Medical History:   Diagnosis Date    Anemia     Benign essential HTN     Carotid artery stenosis 2020    RIGHT CAROTID  40-60% STENOSIS ON DOPPLER RECORD SAYS H/O TIA BUT PT DENIES THIS??    CKD (chronic kidney disease), stage III     Colon polyp 2012    LAST C QUESTIONABLE    Condition not found 2018    COLOGUARD NEG 9-18    Dementia     Depression     Diverticular disease of colon     WITHOUT HEMORRHAGE    Diverticulosis     WITH LOWR GI BLEED APPROS AGE 49    Hearing loss     High risk medication use     IBS (irritable bowel syndrome)     WITH DIARRHEA    Mixed hyperlipidemia     Overweight (BMI 25.0-29.9)     Skin cancer     LEG    Transient cerebral ischemia 2019    POSSIBLE A/W FALL    Type 2 diabetes mellitus     WITH STAGE 3 CKD WITHOUT LONG TERM CURRENT USE OF INSULIN   WITH POTEINURIA     Past Surgical History:   Procedure Laterality Date    CATARACT EXTRACTION       COLONOSCOPY  2012    [facility]    INTERVENTIONAL RADIOLOGY PROCEDURE Bilateral 8/31/2022    Procedure: Carotid Cerebral Angiogram;  Surgeon: Stan Falk MD;  Location: [facility] INVASIVE LOCATION;  Service: Interventional Radiology;  Laterality: Bilateral;    SKIN BIOPSY Right 2016    LEG    TONSILLECTOMY  1950      General Information       Row Name 09/22/24 1526          OT Time and Intention    Document Type therapy note (daily note)  -AR     Mode of Treatment individual therapy;occupational therapy  -AR       Row Name 09/22/24 1526          General Information    Existing Precautions/Restrictions fall  -AR       Row Name 09/22/24 1526          Cognition    Orientation Status (Cognition) oriented x 3  -AR       Row Name 09/22/24 1526          Safety Issues, Functional Mobility    Safety Issues Affecting Function (Mobility) judgment;problem-solving;safety precaution awareness;safety precautions follow-through/compliance;sequencing abilities  -AR     Impairments Affecting Function (Mobility) balance;endurance/activity tolerance;shortness of breath;strength;cognition  -AR               User Key  (r) = Recorded By, (t) = Taken By, (c) = Cosigned By      Initials Name Provider Type    AR Daniela Cross OT Occupational Therapist                     Mobility/ADL's       Row Name 09/22/24 1527          Bed Mobility    Bed Mobility scooting/bridging;supine-sit  -AR     Scooting/Bridging Coffee (Bed Mobility) supervision;verbal cues  -AR     Supine-Sit Coffee (Bed Mobility) contact guard;verbal cues  -AR     Assistive Device (Bed Mobility) bed rails;head of bed elevated  -AR     Comment, (Bed Mobility) No c/o dizziness  -AR       Row Name 09/22/24 1527          Transfers    Transfers sit-stand transfer;stand-sit transfer;bed-chair transfer  -AR     Comment, (Transfers) Cues for hand placement and sequencing. Following transition to chair he transitioned sit-to-stand with CGA for line management.  -AR        Row Name 09/22/24 1527          Bed-Chair Transfer    Bed-Chair Early (Transfers) verbal cues;minimum assist (75% patient effort)  -AR     Assistive Device (Bed-Chair Transfers) other (see comments)  bed rail and armrest on chair  -AR       Row Name 09/22/24 1527          Sit-Stand Transfer    Sit-Stand Early (Transfers) contact guard;verbal cues  -AR       Row Name 09/22/24 1527          Stand-Sit Transfer    Stand-Sit Early (Transfers) verbal cues;minimum assist (75% patient effort)  -AR       Row Name 09/22/24 1527          Activities of Daily Living    BADL Assessment/Intervention upper body dressing;grooming  -AR       Row Name 09/22/24 1527          Grooming Assessment/Training    Early Level (Grooming) other (see comments);maximum assist (25% patient effort)  lotion to back  -AR     Position (Grooming) edge of bed sitting  -AR       Row Name 09/22/24 1527          Upper Body Dressing Assessment/Training    Early Level (Upper Body Dressing) don;pajama/robe;minimum assist (75% patient effort)  -AR     Position (Upper Body Dressing) edge of bed sitting  -AR               User Key  (r) = Recorded By, (t) = Taken By, (c) = Cosigned By      Initials Name Provider Type    Daniela Lacy OT Occupational Therapist                   Obj/Interventions       [facility] Name 09/22/24 1529          Balance    Balance Assessment sitting static balance;sitting dynamic balance;standing dynamic balance;standing static balance  -AR     Static Sitting Balance supervision  -AR     Dynamic Sitting Balance contact guard  -AR     Position, Sitting Balance unsupported;sitting edge of bed  -AR     Static Standing Balance contact guard  -AR     Dynamic Standing Balance minimal assist  -AR     Position/Device Used, Standing Balance supported  -AR               User Key  (r) = Recorded By, (t) = Taken By, (c) = Cosigned By      Initials Name Provider Type    Daniela Lacy OT  Occupational Therapist                   Goals/Plan    No documentation.                  Clinical Impression       Row Name 09/22/24 1529          Pain Assessment    Pretreatment Pain Rating 0/10 - no pain  -AR     Posttreatment Pain Rating 0/10 - no pain  -AR       Row Name 09/22/24 1529          Plan of Care Review    Plan of Care Reviewed With patient  -AR     Progress improving  -AR     Outcome Evaluation Pt completed bed mobility with CGA, UB dressing with min assist and transfer training with min assist. Pt limited with generalized weakness, decreased occupational endurance, decreased balance and is performing below baseline status. Recommend IPR.  -AR       Row Name 09/22/24 1529          Therapy Plan Review/Discharge Plan (OT)    Anticipated Discharge Disposition (OT) inpatient rehabilitation facility  -AR       Row Name 09/22/24 1529          Vital Signs    Pre Patient Position Supine  -AR     Intra Patient Position Standing  -AR     Post Patient Position Sitting  -AR       Row Name 09/22/24 1529          Positioning and Restraints    Pre-Treatment Position in bed  -AR     Post Treatment Position chair  Rash noted to back, OT called RN who was aware  -AR     In Chair reclined;call light within reach;encouraged to call for assist;exit alarm on;legs elevated  OT notified RN that pt has rash on back- RN already aware  -AR               User Key  (r) = Recorded By, (t) = Taken By, (c) = Cosigned By      Initials Name Provider Type    Daniela Lacy, OT Occupational Therapist                   Outcome Measures       Row Name 09/22/24 1533          How much help from another is currently needed...    Putting on and taking off regular lower body clothing? 2  -AR     Bathing (including washing, rinsing, and drying) 2  -AR     Toileting (which includes using toilet bed pan or urinal) 2  -AR     Putting on and taking off regular upper body clothing 3  -AR     Taking care of personal grooming (such as  brushing teeth) 3  -AR     Eating meals 3  -AR     AM-PAC 6 Clicks Score (OT) 15  -AR       Row Name 09/22/24 1533          Functional Assessment    Outcome Measure Options AM-PAC 6 Clicks Daily Activity (OT)  -AR               User Key  (r) = Recorded By, (t) = Taken By, (c) = Cosigned By      Initials Name Provider Type    AR Daniela Cross, OT Occupational Therapist                    Occupational Therapy Education       Title: PT OT SLP Therapies (In Progress)       Topic: Occupational Therapy (In Progress)       Point: ADL training (Done)       Description:   Instruct learner(s) on proper safety adaptation and remediation techniques during self care or transfers.   Instruct in proper use of assistive devices.                  Learning Progress Summary             Patient Eager, E,D, VU,NR by AR at 9/22/2024 1534    Acceptance, E,D, NR by CS at 9/18/2024 1113                         Point: Home exercise program (Not Started)       Description:   Instruct learner(s) on appropriate technique for monitoring, assisting and/or progressing therapeutic exercises/activities.                  Learner Progress:  Not documented in this visit.              Point: Precautions (Done)       Description:   Instruct learner(s) on prescribed precautions during self-care and functional transfers.                  Learning Progress Summary             Patient Eager, E,D, VU,NR by AR at 9/22/2024 1534    Acceptance, E,D, NR by CS at 9/18/2024 1113                         Point: Body mechanics (Done)       Description:   Instruct learner(s) on proper positioning and spine alignment during self-care, functional mobility activities and/or exercises.                  Learning Progress Summary             Patient Eager, E,D, VU,NR by AR at 9/22/2024 1534    Acceptance, E,D, NR by CS at 9/18/2024 1113                                         User Key       Initials Effective Dates Name Provider Type Discipline    AR 07/11/23 -   Daniela Cross OT Occupational Therapist OT    CS 06/16/21 -  Jhonatan Lee OT Occupational Therapist OT                  OT Recommendation and Plan     Plan of Care Review  Plan of Care Reviewed With: patient  Progress: improving  Outcome Evaluation: Pt completed bed mobility with CGA, UB dressing with min assist and transfer training with min assist. Pt limited with generalized weakness, decreased occupational endurance, decreased balance and is performing below baseline status. Recommend IPR.     Time Calculation:         Time Calculation- OT       Row Name 09/22/24 1534             Time Calculation- OT    OT Start Time 1400  -AR      OT Received On 09/22/24  -AR      OT Goal Re-Cert Due Date 09/28/24  -AR         Timed Charges    39431 - OT Self Care/Mgmt Minutes 24  -AR         Total Minutes    Timed Charges Total Minutes 24  -AR       Total Minutes 24  -AR                User Key  (r) = Recorded By, (t) = Taken By, (c) = Cosigned By      Initials Name Provider Type    AR Daniela Cross OT Occupational Therapist                  Therapy Charges for Today       Code Description Service Date Service Provider Modifiers Qty    [id number] HC OT SELF CARE/MGMT/TRAIN EA 15 MIN 9/22/2024 Daniela Cross OT GO 2                 Daniela Cross OT  9/22/2024

## 2024-09-22 NOTE — PROGRESS NOTES
INFECTIOUS DISEASE f/u     Orestes Garcia Jr.  1940  [id number]    Date of Consult: 9/22/2024    Admission Date: 9/17/2024      Requesting Provider: Jose Alberto Vang MD  Evaluating Physician: Mariano Mancuso MD    Reason for Consultation: MRSA bacteremia    History of present illness:    Patient is a 84 y.o. male  With hypertension, hyperlipidemia, coronary carotid artery disease, chronic kidney disease, dementia, diabetes mellitus type 2 has had nausea vomiting diarrhea x 3 days patient received medical care at [facility] for pancreatitis patient discharged home but then had generalized weakness patient given fluids and admitted to hospital for further observation has had normal CT scan abdomen pelvis and chest x-ray showed bilateral pleural effusions because of positive blood cultures for MRSA we are being consulted for further evaluation    Patient had a positive GI PCR panel for norovirus    Patient denies any pain in his arms from previous IV sites patient has any indwelling hardware denies back pain as well    9/20/24 has left arm redness, pain at piv site; no complaints    9/21/2024 patient is awake alert no complaints poor historian denies pain in his left arm patient is afebrile and has intermittent hypertension    9/22/2024 afebrile no events overnight normotensive    Past Medical History:   Diagnosis Date    Anemia     Benign essential HTN     Carotid artery stenosis 07/2020    RIGHT CAROTID  40-60% STENOSIS ON DOPPLER RECORD SAYS H/O TIA BUT PT DENIES THIS??    CKD (chronic kidney disease), stage III     Colon polyp 2012    LAST C QUESTIONABLE    Condition not found 2018    COLOGUARD NEG 9-18    Dementia     Depression     Diverticular disease of colon     WITHOUT HEMORRHAGE    Diverticulosis     WITH LOWR GI BLEED APPROS AGE 49    Hearing loss     High risk medication use     IBS (irritable bowel syndrome)     WITH DIARRHEA    Mixed hyperlipidemia     Overweight (BMI  25.0-29.9)     Skin cancer     LEG    Transient cerebral ischemia 2019    POSSIBLE A/W FALL    Type 2 diabetes mellitus     WITH STAGE 3 CKD WITHOUT LONG TERM CURRENT USE OF INSULIN   WITH POTEINURIA       Past Surgical History:   Procedure Laterality Date    CATARACT EXTRACTION  2020    COLONOSCOPY  2012    [facility]    INTERVENTIONAL RADIOLOGY PROCEDURE Bilateral 8/31/2022    Procedure: Carotid Cerebral Angiogram;  Surgeon: Stan Falk MD;  Location: [facility] INVASIVE LOCATION;  Service: Interventional Radiology;  Laterality: Bilateral;    SKIN BIOPSY Right 2016    LEG    TONSILLECTOMY  1950       Family History   Problem Relation Age of Onset    Cancer Mother     Hypertension Mother     Pancreatic cancer Mother     Alzheimer's disease Father     Coronary artery disease Father 86    Diabetes Father     Hearing loss Father        Social History     Socioeconomic History    Marital status:    Tobacco Use    Smoking status: Never    Smokeless tobacco: Never   Vaping Use    Vaping status: Never Used   Substance and Sexual Activity    Alcohol use: Never    Drug use: Never    Sexual activity: Defer       No Known Allergies      Medication:    Current Facility-Administered Medications:     acetaminophen (TYLENOL) tablet 650 mg, 650 mg, Oral, Q4H PRN, 650 mg at 09/21/24 2005 **OR** acetaminophen (TYLENOL) 160 MG/5ML oral solution 650 mg, 650 mg, Oral, Q4H PRN **OR** acetaminophen (TYLENOL) suppository 650 mg, 650 mg, Rectal, Q4H PRN, Micaela Alcantara APRN    aspirin EC tablet 81 mg, 81 mg, Oral, Daily, Micaela Alcantara, APRN, 81 mg at 09/22/24 0900    atorvastatin (LIPITOR) tablet 20 mg, 20 mg, Oral, Nightly, Micaela Alcantara APRN, 20 mg at 09/21/24 2005    sennosides-docusate (PERICOLACE) 8.6-50 MG per tablet 2 tablet, 2 tablet, Oral, BID, 2 tablet at 09/22/24 0900 **AND** polyethylene glycol (MIRALAX) packet 17 g, 17 g, Oral, Daily PRN **AND** bisacodyl (DULCOLAX) EC tablet 5 mg, 5 mg, Oral, Daily  PRN, 5 mg at 09/20/24 1745 **AND** bisacodyl (DULCOLAX) suppository 10 mg, 10 mg, Rectal, Daily PRN, Jonel Eduardo MD    busPIRone (BUSPAR) tablet 5 mg, 5 mg, Oral, BID, Micaela Alcantara APRN, 5 mg at 09/22/24 0900    citalopram (CeleXA) tablet 20 mg, 20 mg, Oral, Daily, Jonel Eduardo MD, 20 mg at 09/22/24 0901    clopidogrel (PLAVIX) tablet 75 mg, 75 mg, Oral, Daily, Micaela Alcantara APRN, 75 mg at 09/22/24 0900    dextrose (D50W) (25 g/50 mL) IV injection 25 g, 25 g, Intravenous, Q15 Min PRN, Micaela Alcantara APRN    dextrose (GLUTOSE) oral gel 15 g, 15 g, Oral, Q15 Min PRN, Micaela Alcantara APRN    donepezil (ARICEPT) tablet 10 mg, 10 mg, Oral, Q PM, Micaela Alcantara APRN, 10 mg at 09/21/24 2005    glucagon (GLUCAGEN) injection 1 mg, 1 mg, Intramuscular, Q15 Min PRN, Micaela Alcantara APRN    hydrALAZINE (APRESOLINE) injection 20 mg, 20 mg, Intravenous, Q6H PRN, Jonel Eduardo MD    Insulin Lispro (humaLOG) injection 2-7 Units, 2-7 Units, Subcutaneous, 4x Daily AC & at Bedtime, Micaela Alcantara APRN, 2 Units at 09/22/24 1218    lisinopril (PRINIVIL,ZESTRIL) tablet 5 mg, 5 mg, Oral, Daily, Micaela Alcantara APRN, 5 mg at 09/22/24 0900    nitroglycerin (NITROSTAT) SL tablet 0.4 mg, 0.4 mg, Sublingual, Q5 Min PRN, Micaela Alcantara APRN    Pharmacy to dose vancomycin, , Does not apply, Continuous PRN, Jonel Eduardo MD    Sodium Chloride (PF) 0.9 % 10 mL, 10 mL, Intravenous, PRN, Herlinda Quispe MD    [COMPLETED] Insert Peripheral IV, , , Once **AND** sodium chloride 0.9 % flush 10 mL, 10 mL, Intravenous, PRN, Herlinda Quispe MD    sodium chloride 0.9 % flush 10 mL, 10 mL, Intravenous, Q12H, Micaela Alcantara APRN, 10 mL at 09/22/24 0901    sodium chloride 0.9 % flush 10 mL, 10 mL, Intravenous, PRN, Micaela Alcantara, RANI    sodium chloride 0.9 % infusion 40 mL, 40 mL, Intravenous, PRN, Micaela Alcantara APRN    traZODone (DESYREL) tablet 50 mg,  50 mg, Oral, Nightly PRN, Micaela Alcantara, APRN, 50 mg at 24    vancomycin IVPB 1500 mg in 0.9% NaCl (Premix) 500 mL, 1,500 mg, Intravenous, Q24H, Rocio Schmidt, PharmD, Last Rate: 333.3 mL/hr at 24 0901, 1,500 mg at 24 0901    Antibiotics:  Anti-Infectives (From admission, onward)      Ordered     Dose/Rate Route Frequency Start Stop    24 0856  vancomycin IVPB 1500 mg in 0.9% NaCl (Premix) 500 mL        Ordering Provider: Rocio Schmidt, PharmD    1,500 mg  333.3 mL/hr over 90 Minutes Intravenous Every 24 Hours 24 0945 24 0859    24 0957  vancomycin IVPB 1750 mg in 0.9% Sodium Chloride (premix) 500 mL        Ordering Provider: Tod Hodges RPH    1,750 mg  285.7 mL/hr over 105 Minutes Intravenous Once 24 1045 24 1247    24 0938  Pharmacy to dose vancomycin        Ordering Provider: Jonel Eduardo MD     Does not apply Continuous PRN 24 0937 24 0936              Review of Systems:  See HPI    Physical Exam:   Vital Signs  Temp (24hrs), Av.4 °F (36.9 °C), Min:97.8 °F (36.6 °C), Max:99.2 °F (37.3 °C)    Temp  Min: 97.8 °F (36.6 °C)  Max: 99.2 °F (37.3 °C)  BP  Min: 143/77  Max: 199/97  Pulse  Min: 47  Max: 67  Resp  Min: 16  Max: 18  SpO2  Min: 93 %  Max: 97 %    GENERAL: Awake and alert, in no acute distress.   HEENT: Normocephalic, atraumatic.  PERRL. EOMI. No conjunctival injection. No icterus.  No external oral lesions      ABDOMEN: Soft, nontender,  EXT:  No cyanosis, clubbing or edema. No cord.  :  Without Calderon catheter.  MSK: No joint effusions or erythema  SKIN: No rash      Laboratory Data    Results from last 7 days   Lab Units 24  0540 24  2121   WBC 10*3/mm3 7.54 9.73   HEMOGLOBIN g/dL 12.6* 13.2   HEMATOCRIT % 37.8 40.4   PLATELETS 10*3/mm3 123* 125*     Results from last 7 days   Lab Units 24  0810   SODIUM mmol/L 139   POTASSIUM mmol/L 3.6   CHLORIDE mmol/L 104   CO2 mmol/L 25.0   BUN  "mg/dL 13   CREATININE mg/dL 1.19   GLUCOSE mg/dL 118*   CALCIUM mg/dL 8.8     Results from last 7 days   Lab Units 09/17/24  2121   ALK PHOS U/L 85   BILIRUBIN mg/dL 0.6   ALT (SGPT) U/L 28   AST (SGOT) U/L 47*             Results from last 7 days   Lab Units 09/18/24  0030   LACTATE mmol/L 1.5         Results from last 7 days   Lab Units 09/21/24  0810   VANCOMYCIN RM mcg/mL 10.00     Estimated Creatinine Clearance: 55.1 mL/min (by C-G formula based on SCr of 1.19 mg/dL).      Microbiology:  Blood Culture   Date Value Ref Range Status   09/17/2024 Abnormal Stain (C)  Preliminary     BCID, PCR   Date Value Ref Range Status   09/17/2024 (C) Negative by BCID PCR. Culture to Follow. Final    Staph aureus. mecA/C and MREJ (methicillin resistance gene) detected. Identification by BCID2 PCR.     No results found for: \"CULTURES\", \"HSVCX\", \"URCX\"  No results found for: \"EYECULTURE\", \"GCCX\", \"HSVCULTURE\", \"LABHSV\"  No results found for: \"LEGIONELLA\", \"MRSACX\", \"MUMPSCX\", \"MYCOPLASCX\"  No results found for: \"NOCARDIACX\", \"STOOLCX\"  No results found for: \"THROATCX\", \"UNSTIMCULT\", \"URINECX\", \"CULTURE\", \"VZVCULTUR\"  No results found for: \"VIRALCULTU\", \"WOUNDCX\"        Radiology:  Imaging Results (Last 72 Hours)       ** No results found for the last 72 hours. **              Impression:   MRSA bacteremia  Norovirus gastroenteritis  Fever  Lactic acidosis concerning for tissue perfusion mismatch  PLAN/RECOMMENDATIONS:   Thank you for asking us to see Orestes Garcia Jr., I recommend the following:    Based on physical exam and radiology studies I do not see a source of MRSA bacteremia.    Unclear significance of positive blood cultures certainly MRSA in the blood needs to be treated as if it is real.    Patient clearly had nausea vomiting and diarrhea in the norovirus makes sense to explain the gastro enteritis presentation    Sometimes if patients are mid to the hospital they can have peripheral IVs infiltrated could have a skin " soft tissue phlebitis which can lead to bacteremia.    Cover for MRSA with vancomycin per pharmacy dosing    And for area under the cover 400 600    Continue supportive care and appropriate isolation for gastroenteritis from norovirus    Apply warm compresses to left arm elevate left arm    Follow bl cx  Mariano Mancuso MD  9/22/2024  15:46 EDT

## 2024-09-23 LAB
ANION GAP SERPL CALCULATED.3IONS-SCNC: 12 MMOL/L (ref 5–15)
BACTERIA SPEC AEROBE CULT: ABNORMAL
BACTERIA SPEC AEROBE CULT: NORMAL
BASOPHILS # BLD AUTO: 0.06 10*3/MM3 (ref 0–0.2)
BASOPHILS NFR BLD AUTO: 0.7 % (ref 0–1.5)
BUN SERPL-MCNC: 12 MG/DL (ref 8–23)
BUN/CREAT SERPL: 9.9 (ref 7–25)
CALCIUM SPEC-SCNC: 9 MG/DL (ref 8.6–10.5)
CHLORIDE SERPL-SCNC: 105 MMOL/L (ref 98–107)
CO2 SERPL-SCNC: 24 MMOL/L (ref 22–29)
CREAT SERPL-MCNC: 1.21 MG/DL (ref 0.76–1.27)
DEPRECATED RDW RBC AUTO: 40.8 FL (ref 37–54)
EGFRCR SERPLBLD CKD-EPI 2021: 58.3 ML/MIN/1.73
EOSINOPHIL # BLD AUTO: 0.25 10*3/MM3 (ref 0–0.4)
EOSINOPHIL NFR BLD AUTO: 3 % (ref 0.3–6.2)
ERYTHROCYTE [DISTWIDTH] IN BLOOD BY AUTOMATED COUNT: 12.4 % (ref 12.3–15.4)
GLUCOSE BLDC GLUCOMTR-MCNC: 129 MG/DL (ref 70–130)
GLUCOSE BLDC GLUCOMTR-MCNC: 163 MG/DL (ref 70–130)
GLUCOSE BLDC GLUCOMTR-MCNC: 175 MG/DL (ref 70–130)
GLUCOSE BLDC GLUCOMTR-MCNC: 181 MG/DL (ref 70–130)
GLUCOSE SERPL-MCNC: 120 MG/DL (ref 65–99)
GRAM STN SPEC: ABNORMAL
HCT VFR BLD AUTO: 42.6 % (ref 37.5–51)
HGB BLD-MCNC: 14.1 G/DL (ref 13–17.7)
IMM GRANULOCYTES # BLD AUTO: 0.04 10*3/MM3 (ref 0–0.05)
IMM GRANULOCYTES NFR BLD AUTO: 0.5 % (ref 0–0.5)
ISOLATED FROM: ABNORMAL
LYMPHOCYTES # BLD AUTO: 1.13 10*3/MM3 (ref 0.7–3.1)
LYMPHOCYTES NFR BLD AUTO: 13.7 % (ref 19.6–45.3)
MCH RBC QN AUTO: 29.7 PG (ref 26.6–33)
MCHC RBC AUTO-ENTMCNC: 33.1 G/DL (ref 31.5–35.7)
MCV RBC AUTO: 89.9 FL (ref 79–97)
MONOCYTES # BLD AUTO: 0.73 10*3/MM3 (ref 0.1–0.9)
MONOCYTES NFR BLD AUTO: 8.8 % (ref 5–12)
NEUTROPHILS NFR BLD AUTO: 6.05 10*3/MM3 (ref 1.7–7)
NEUTROPHILS NFR BLD AUTO: 73.3 % (ref 42.7–76)
NRBC BLD AUTO-RTO: 0 /100 WBC (ref 0–0.2)
PLATELET # BLD AUTO: 213 10*3/MM3 (ref 140–450)
PMV BLD AUTO: 10.7 FL (ref 6–12)
POTASSIUM SERPL-SCNC: 4 MMOL/L (ref 3.5–5.2)
RBC # BLD AUTO: 4.74 10*6/MM3 (ref 4.14–5.8)
SODIUM SERPL-SCNC: 141 MMOL/L (ref 136–145)
WBC NRBC COR # BLD AUTO: 8.26 10*3/MM3 (ref 3.4–10.8)

## 2024-09-23 PROCEDURE — 85025 COMPLETE CBC W/AUTO DIFF WBC: CPT | Performed by: INTERNAL MEDICINE

## 2024-09-23 PROCEDURE — 63710000001 INSULIN LISPRO (HUMAN) PER 5 UNITS: Performed by: NURSE PRACTITIONER

## 2024-09-23 PROCEDURE — 87040 BLOOD CULTURE FOR BACTERIA: CPT | Performed by: STUDENT IN AN ORGANIZED HEALTH CARE EDUCATION/TRAINING PROGRAM

## 2024-09-23 PROCEDURE — 99232 SBSQ HOSP IP/OBS MODERATE 35: CPT | Performed by: STUDENT IN AN ORGANIZED HEALTH CARE EDUCATION/TRAINING PROGRAM

## 2024-09-23 PROCEDURE — 80048 BASIC METABOLIC PNL TOTAL CA: CPT | Performed by: INTERNAL MEDICINE

## 2024-09-23 PROCEDURE — 82948 REAGENT STRIP/BLOOD GLUCOSE: CPT

## 2024-09-23 PROCEDURE — 25010000002 VANCOMYCIN HCL IN NACL 1.5-0.9 GM/500ML-% SOLUTION

## 2024-09-23 RX ORDER — LISINOPRIL 10 MG/1
10 TABLET ORAL DAILY
Status: DISCONTINUED | OUTPATIENT
Start: 2024-09-24 | End: 2024-09-25 | Stop reason: HOSPADM

## 2024-09-23 RX ADMIN — BUSPIRONE HYDROCHLORIDE 5 MG: 10 TABLET ORAL at 08:29

## 2024-09-23 RX ADMIN — BUSPIRONE HYDROCHLORIDE 5 MG: 10 TABLET ORAL at 21:27

## 2024-09-23 RX ADMIN — ASPIRIN 81 MG: 81 TABLET, COATED ORAL at 08:30

## 2024-09-23 RX ADMIN — Medication 10 ML: at 21:28

## 2024-09-23 RX ADMIN — LISINOPRIL 5 MG: 5 TABLET ORAL at 08:29

## 2024-09-23 RX ADMIN — INSULIN LISPRO 2 UNITS: 100 INJECTION, SOLUTION INTRAVENOUS; SUBCUTANEOUS at 12:34

## 2024-09-23 RX ADMIN — DONEPEZIL HYDROCHLORIDE 10 MG: 10 TABLET, FILM COATED ORAL at 21:27

## 2024-09-23 RX ADMIN — Medication 1500 MG: at 08:39

## 2024-09-23 RX ADMIN — Medication 10 ML: at 08:30

## 2024-09-23 RX ADMIN — INSULIN LISPRO 2 UNITS: 100 INJECTION, SOLUTION INTRAVENOUS; SUBCUTANEOUS at 17:29

## 2024-09-23 RX ADMIN — CITALOPRAM HYDROBROMIDE 20 MG: 20 TABLET ORAL at 08:30

## 2024-09-23 RX ADMIN — ATORVASTATIN CALCIUM 20 MG: 20 TABLET, FILM COATED ORAL at 21:27

## 2024-09-23 RX ADMIN — CLOPIDOGREL BISULFATE 75 MG: 75 TABLET ORAL at 08:29

## 2024-09-23 NOTE — CASE MANAGEMENT/SOCIAL WORK
Continued Stay Note  [facility]     Patient Name: Orestes Garcia Jr.  MRN: [id number]  Today's Date: 9/23/2024    Admit Date: 9/17/2024    Plan: [facility]   Discharge Plan       Row Name 09/23/24 1329       Plan    Plan [facility]    Plan Comments Spoke with Anny.  Patient's insurance has approved a skilled bed at [facility] when medically ready for discharge.  Discussed patient in MDR.  Waiting on final recommendations from ID.  Updated wife by phone.  Patient's son will transport.  CM will continue to follow.    Final Discharge Disposition Code 03 - skilled nursing facility (SNF)                   Discharge Codes    No documentation.                 Expected Discharge Date and Time       Expected Discharge Date Expected Discharge Time    Sep 24, 2024               Fatuma Smith RN

## 2024-09-23 NOTE — PROGRESS NOTES
"          Clinical Nutrition Assessment     Patient Name: Orestes Garcia Jr.  YOB: 1940  MRN: [id number]  Date of Encounter: 09/23/24 12:00 EDT  Admission date: 9/17/2024  Reason for Visit: Follow-up protocol, NPO/Clear liquid x6 days     Assessment   Nutrition Assessment   Admission Diagnosis:  Weakness [R53.1]  Bacteremia [R78.81]    Problem List:    Weakness    Carotid artery stenosis, asymptomatic, bilateral    Hyperlipidemia LDL goal <70    Type 2 diabetes mellitus with stage 3a chronic kidney disease, without long-term current use of insulin    Benign essential hypertension    Dementia without behavioral disturbance    Norovirus    MRSA bacteremia    Bacteremia      PMH:   He  has a past medical history of Anemia, Benign essential HTN, Carotid artery stenosis (07/2020), CKD (chronic kidney disease), stage III, Colon polyp (2012), Condition not found (2018), Dementia, Depression, Diverticular disease of colon, Diverticulosis, Hearing loss, High risk medication use, IBS (irritable bowel syndrome), Mixed hyperlipidemia, Overweight (BMI 25.0-29.9), Skin cancer, Transient cerebral ischemia (2019), and Type 2 diabetes mellitus.    PSH:  He  has a past surgical history that includes Skin biopsy (Right, 2016); Colonoscopy (2012); Tonsillectomy (1950); Cataract extraction (2020); and Interventional radiology procedure (Bilateral, 8/31/2022).    Applicable Nutrition History:     Anthropometrics     Height: Height: 177.8 cm (70\")  Last Filed Weight: Weight: 87.8 kg (193 lb 8 oz) (09/22/24 2300)  Method: Weight Method: Bed scale  BMI: BMI (Calculated): 27.8    UBW: Per EMR wt of 191 lbs on 2/16/24    Weight change: No significant changes noted at this time   Measured wt ordered.    Nutrition Focused Physical Exam    Date: 9/22    Unable to perform due to Defer pending indication     Subjective   Reported/Observed/Food/Nutrition Related History:   09/23/24   Patient reported tolerating current CLD. Stated he " "isn't currently hungry. Willing to advance diet, stated \"will do whatever you all think I need to do\". Denied any GI issues, no documented n/v/d. Unclear why patient has been on CLD for so long. Spoke with today's provider who is agreeable with advancing PO diet as well. Declined any chewing or swallowing difficulties. NKFA.    9/22  Pt allows at usual wt and was eating  ok PTA.    Current Nutrition Prescription   PO: Diet: Diabetic; Consistent Carbohydrate; Fluid Consistency: Thin (IDDSI 0)  Oral Nutrition Supplement: n/a  Intake: no meals documented since previous visit    Assessment & Plan   Nutrition Diagnosis   Date:  9/22            Updated:  09/23/24  Problem Inadequate oral intake    Etiology Recent N/V   Signs/Symptoms NPO/Clr Liq diet > 120 hjr   Status: Discontinued - PO diet adv      Goal / Objectives:   Nutrition to support treatment and Establish PO, Tolerate PO    Nutrition Intervention      Follow treatment progress, Care plan reviewed, Interview for preferences, Menu adjusted, Encourage intake    Following to ensure adequate PO intake since advanced PO diet    Monitoring/Evaluation:   Per protocol, PO intake, Weight, GI status, Symptoms, POC/GOC    Elma Carr, MS,RD,LD  Time Spent: 25min  "

## 2024-09-23 NOTE — PROGRESS NOTES
INFECTIOUS DISEASE f/u     Orestes Garcia Jr.  1940  [id number]      Admission Date: 9/17/2024    Reason for Consultation: MRSA bacteremia    History of present illness:    Patient is a 84 y.o. male  With hypertension, hyperlipidemia, coronary carotid artery disease, chronic kidney disease, dementia, diabetes mellitus type 2 has had nausea vomiting diarrhea x 3 days patient received medical care at [facility] for pancreatitis patient discharged home but then had generalized weakness patient given fluids and admitted to hospital for further observation has had normal CT scan abdomen pelvis and chest x-ray showed bilateral pleural effusions because of positive blood cultures for MRSA we are being consulted for further evaluation    Patient had a positive GI PCR panel for norovirus    Patient denies any pain in his arms from previous IV sites patient has any indwelling hardware denies back pain as well    9/20/24 has left arm redness, pain at piv site; no complaints    9/21/2024 patient is awake alert no complaints poor historian denies pain in his left arm patient is afebrile and has intermittent hypertension    9/22/2024 afebrile no events overnight normotensive    9/23/24: Afebrile. No new events overnight.  Very [place].  Poor historian and unable to provide a reliable history.  Denies worsening diarrhea.  Left arm with indurated and warmth at site of prior IV line, likely thrombophlebitis.     Past Medical History:   Diagnosis Date    Anemia     Benign essential HTN     Carotid artery stenosis 07/2020    RIGHT CAROTID  40-60% STENOSIS ON DOPPLER RECORD SAYS H/O TIA BUT PT DENIES THIS??    CKD (chronic kidney disease), stage III     Colon polyp 2012    LAST C QUESTIONABLE    Condition not found 2018    COLOGUARD NEG 9-18    Dementia     Depression     Diverticular disease of colon     WITHOUT HEMORRHAGE    Diverticulosis     WITH LOWR GI BLEED APPROS AGE 49    Hearing loss     High  risk medication use     IBS (irritable bowel syndrome)     WITH DIARRHEA    Mixed hyperlipidemia     Overweight (BMI 25.0-29.9)     Skin cancer     LEG    Transient cerebral ischemia 2019    POSSIBLE A/W FALL    Type 2 diabetes mellitus     WITH STAGE 3 CKD WITHOUT LONG TERM CURRENT USE OF INSULIN   WITH POTEINURIA       Past Surgical History:   Procedure Laterality Date    CATARACT EXTRACTION  2020    COLONOSCOPY  2012    [facility]    INTERVENTIONAL RADIOLOGY PROCEDURE Bilateral 8/31/2022    Procedure: Carotid Cerebral Angiogram;  Surgeon: Stan Falk MD;  Location: [facility] INVASIVE LOCATION;  Service: Interventional Radiology;  Laterality: Bilateral;    SKIN BIOPSY Right 2016    LEG    TONSILLECTOMY  1950       Family History   Problem Relation Age of Onset    Cancer Mother     Hypertension Mother     Pancreatic cancer Mother     Alzheimer's disease Father     Coronary artery disease Father 86    Diabetes Father     Hearing loss Father        Social History     Socioeconomic History    Marital status:    Tobacco Use    Smoking status: Never    Smokeless tobacco: Never   Vaping Use    Vaping status: Never Used   Substance and Sexual Activity    Alcohol use: Never    Drug use: Never    Sexual activity: Defer       No Known Allergies      Medication:    Current Facility-Administered Medications:     acetaminophen (TYLENOL) tablet 650 mg, 650 mg, Oral, Q4H PRN, 650 mg at 09/21/24 2005 **OR** acetaminophen (TYLENOL) 160 MG/5ML oral solution 650 mg, 650 mg, Oral, Q4H PRN **OR** acetaminophen (TYLENOL) suppository 650 mg, 650 mg, Rectal, Q4H PRN, Micaela Alcantara APRN    aspirin EC tablet 81 mg, 81 mg, Oral, Daily, Micaela Alcantara APRN, 81 mg at 09/23/24 0830    atorvastatin (LIPITOR) tablet 20 mg, 20 mg, Oral, Nightly, Micaela Alcantara APRN, 20 mg at 09/22/24 2023    sennosides-docusate (PERICOLACE) 8.6-50 MG per tablet 2 tablet, 2 tablet, Oral, BID, 2 tablet at 09/22/24 0900 **AND**  polyethylene glycol (MIRALAX) packet 17 g, 17 g, Oral, Daily PRN **AND** bisacodyl (DULCOLAX) EC tablet 5 mg, 5 mg, Oral, Daily PRN, 5 mg at 09/20/24 1745 **AND** bisacodyl (DULCOLAX) suppository 10 mg, 10 mg, Rectal, Daily PRN, Jonel Eduardo MD    busPIRone (BUSPAR) tablet 5 mg, 5 mg, Oral, BID, Micaela Alcantara APRN, 5 mg at 09/23/24 0829    citalopram (CeleXA) tablet 20 mg, 20 mg, Oral, Daily, Jonel Eduardo MD, 20 mg at 09/23/24 0830    clopidogrel (PLAVIX) tablet 75 mg, 75 mg, Oral, Daily, Micaela Alcantara APRN, 75 mg at 09/23/24 0829    dextrose (D50W) (25 g/50 mL) IV injection 25 g, 25 g, Intravenous, Q15 Min PRN, Micaela Alcantara APRN    dextrose (GLUTOSE) oral gel 15 g, 15 g, Oral, Q15 Min PRN, Micaela Alcantara APRN    donepezil (ARICEPT) tablet 10 mg, 10 mg, Oral, Q PM, Micaela Alcantara APRN, 10 mg at 09/22/24 2024    glucagon (GLUCAGEN) injection 1 mg, 1 mg, Intramuscular, Q15 Min PRN, Micaela Alcantara APRN    hydrALAZINE (APRESOLINE) injection 20 mg, 20 mg, Intravenous, Q6H PRN, Jonel Eduardo MD    Insulin Lispro (humaLOG) injection 2-7 Units, 2-7 Units, Subcutaneous, 4x Daily AC & at Bedtime, Micaela Alcantara APRN, 2 Units at 09/23/24 1234    [START ON 9/24/2024] lisinopril (PRINIVIL,ZESTRIL) tablet 10 mg, 10 mg, Oral, Daily, Danie Mobley DO    nitroglycerin (NITROSTAT) SL tablet 0.4 mg, 0.4 mg, Sublingual, Q5 Min PRN, Micaela Alcantara APRN    Pharmacy to dose vancomycin, , Does not apply, Continuous PRN, Jonel Eduardo MD    Sodium Chloride (PF) 0.9 % 10 mL, 10 mL, Intravenous, PRN, Herlinda Quispe MD    [COMPLETED] Insert Peripheral IV, , , Once **AND** sodium chloride 0.9 % flush 10 mL, 10 mL, Intravenous, PRN, Herlinda Quispe MD    sodium chloride 0.9 % flush 10 mL, 10 mL, Intravenous, Q12H, Micaela Alcantara APRN, 10 mL at 09/23/24 0830    sodium chloride 0.9 % flush 10 mL, 10 mL, Intravenous, PRN, Micaela Alcantara APRN     sodium chloride 0.9 % infusion 40 mL, 40 mL, Intravenous, PRN, Micaela Alcantara, APRN    traZODone (DESYREL) tablet 50 mg, 50 mg, Oral, Nightly PRN, Micaela Alcantara APRN, 50 mg at 24    vancomycin IVPB 1500 mg in 0.9% NaCl (Premix) 500 mL, 1,500 mg, Intravenous, Q24H, Rocio Schmidt, PharmD, Last Rate: 333.3 mL/hr at 24 0839, 1,500 mg at 24 0839    Antibiotics:  Anti-Infectives (From admission, onward)      Ordered     Dose/Rate Route Frequency Start Stop    24 0856  vancomycin IVPB 1500 mg in 0.9% NaCl (Premix) 500 mL        Ordering Provider: Rocio Schmidt, PharmD    1,500 mg  333.3 mL/hr over 90 Minutes Intravenous Every 24 Hours 24 0945 24 0859    24 0957  vancomycin IVPB 1750 mg in 0.9% Sodium Chloride (premix) 500 mL        Ordering Provider: Tod Hodges RPH    1,750 mg  285.7 mL/hr over 105 Minutes Intravenous Once 24 1045 24 1247    24 0938  Pharmacy to dose vancomycin        Ordering Provider: Jonel Eduardo MD     Does not apply Continuous PRN 24 0937 24 0936              Review of Systems:  See HPI    Physical Exam:   Vital Signs  Temp (24hrs), Av.1 °F (36.7 °C), Min:97.8 °F (36.6 °C), Max:98.3 °F (36.8 °C)    Temp  Min: 97.8 °F (36.6 °C)  Max: 98.3 °F (36.8 °C)  BP  Min: 152/74  Max: 189/100  Pulse  Min: 56  Max: 73  Resp  Min: 16  Max: 18  SpO2  Min: 90 %  Max: 96 %    GENERAL: Awake and alert, in no acute distress.   HEENT: Normocephalic, atraumatic.  PERRL. EOMI. No conjunctival injection. No icterus.  No external oral lesions  ABDOMEN: Soft, nontender,  EXT:  No cyanosis, clubbing or edema. No cord.  :  Without Calderon catheter.  MSK: No joint effusions. Left medial upper arm with erythema, induration, warmth, no tenderness.   SKIN: No rash      Laboratory Data    Results from last 7 days   Lab Units 24  0540 24  2121   WBC 10*3/mm3 7.54 9.73   HEMOGLOBIN g/dL 12.6* 13.2   HEMATOCRIT %  37.8 40.4   PLATELETS 10*3/mm3 123* 125*     Results from last 7 days   Lab Units 09/21/24  0810   SODIUM mmol/L 139   POTASSIUM mmol/L 3.6   CHLORIDE mmol/L 104   CO2 mmol/L 25.0   BUN mg/dL 13   CREATININE mg/dL 1.19   GLUCOSE mg/dL 118*   CALCIUM mg/dL 8.8     Results from last 7 days   Lab Units 09/17/24  2121   ALK PHOS U/L 85   BILIRUBIN mg/dL 0.6   ALT (SGPT) U/L 28   AST (SGOT) U/L 47*             Results from last 7 days   Lab Units 09/18/24  0030   LACTATE mmol/L 1.5         Results from last 7 days   Lab Units 09/21/24  0810   VANCOMYCIN RM mcg/mL 10.00     Estimated Creatinine Clearance: 49.7 mL/min (by C-G formula based on SCr of 1.19 mg/dL).      Microbiology:  Microbiology Results (last 10 days)       Procedure Component Value - Date/Time    Gastrointestinal Panel, PCR - Stool, Per Rectum [[id number]]  (Abnormal) Collected: 09/17/24 2341    Lab Status: Final result Specimen: Stool from Per Rectum Updated: 09/18/24 0844     Campylobacter Not Detected     Plesiomonas shigelloides Not Detected     Salmonella Not Detected     Vibrio Not Detected     Vibrio cholerae Not Detected     Yersinia enterocolitica Not Detected     Enteroaggregative E. coli (EAEC) Not Detected     Enteropathogenic E. coli (EPEC) Not Detected     Enterotoxigenic E. coli (ETEC) lt/st Not Detected     Shiga-like toxin-producing E. coli (STEC) stx1/stx2 Not Detected     Shigella/Enteroinvasive E. coli (EIEC) Not Detected     Cryptosporidium Not Detected     Cyclospora cayetanensis Not Detected     Entamoeba histolytica Not Detected     Giardia lamblia Not Detected     Adenovirus F40/41 Not Detected     Astrovirus Not Detected     Norovirus GI/GII Detected     Comment: If a positive Norovirus result is inconsistent with clinical presentation, the positive Norovirus result should be confirmed using another method.        Rotavirus A Not Detected     Sapovirus (I, II, IV or V) Not Detected    Clostridioides difficile Toxin - Stool, Per  Rectum [[id number]]  (Normal) Collected: 09/17/24 2341    Lab Status: Final result Specimen: Stool from Per Rectum Updated: 09/18/24 0758    Narrative:      The following orders were created for panel order Clostridioides difficile Toxin - Stool, Per Rectum.  Procedure                               Abnormality         Status                     ---------                               -----------         ------                     Clostridioides difficile...[[id number]]  Normal              Final result                 Please view results for these tests on the individual orders.    Clostridioides difficile Toxin, PCR - Stool, Per Rectum [[id number]]  (Normal) Collected: 09/17/24 2341    Lab Status: Final result Specimen: Stool from Per Rectum Updated: 09/18/24 0758     Toxigenic C. difficile by PCR Not Detected    Narrative:      The result indicates the absence of toxigenic C. difficile from stool specimen.     Blood Culture - Blood, Hand, Left [[id number]]  (Abnormal)  (Susceptibility) Collected: 09/17/24 2140    Lab Status: Final result Specimen: Blood from Hand, Left Updated: 09/23/24 0640     Blood Culture Staphylococcus aureus, MRSA     Comment: Infectious disease consultation is highly recommended to rule out distant foci of infection.  Methicillin resistant Staphylococcus aureus, Patient may be an isolation risk.          Isolated from Anaerobic Bottle     Gram Stain Anaerobic Bottle Gram positive cocci in pairs and clusters    Narrative:      Less than seven (7) mL's of blood was collected.  Insufficient quantity may yield false negative results.    Susceptibility        Staphylococcus aureus, MRSA      ELO      Gentamicin Susceptible      Oxacillin Resistant      Rifampin Susceptible      Vancomycin Susceptible                           Blood Culture ID, PCR - Blood, Hand, Left [[id number]]  (Abnormal) Collected: 09/17/24 2140    Lab Status: Final result Specimen: Blood from Hand, Left Updated: 09/19/24 0815      BCID, PCR Staph aureus. mecA/C and MREJ (methicillin resistance gene) detected. Identification by BCID2 PCR.     BOTTLE TYPE Anaerobic Bottle    Narrative:      Infectious disease consultation is highly recommended to rule out distant foci of infection.    Blood Culture - Blood, Arm, Left [[id number]]  (Normal) Collected: 09/17/24 2130    Lab Status: Final result Specimen: Blood from Arm, Left Updated: 09/23/24 0030     Blood Culture No growth at 5 days    Narrative:      Less than seven (7) mL's of blood was collected.  Insufficient quantity may yield false negative results.    COVID PRE-OP / PRE-PROCEDURE SCREENING ORDER (NO ISOLATION) - Swab, Nasopharynx [[id number]]  (Normal) Collected: 09/17/24 2126    Lab Status: Final result Specimen: Swab from Nasopharynx Updated: 09/17/24 2236    Narrative:      The following orders were created for panel order COVID PRE-OP / PRE-PROCEDURE SCREENING ORDER (NO ISOLATION) - Swab, Nasopharynx.  Procedure                               Abnormality         Status                     ---------                               -----------         ------                     Respiratory Panel PCR w/...[[id number]]  Normal              Final result                 Please view results for these tests on the individual orders.    Respiratory Panel PCR w/COVID-19(SARS-CoV-2) BRIGID/ONI/LICO/PAD/COR/ADA In-House, NP Swab in UTM/VTM, 2 HR TAT - Swab, Nasopharynx [[id number]]  (Normal) Collected: 09/17/24 2126    Lab Status: Final result Specimen: Swab from Nasopharynx Updated: 09/17/24 2236     ADENOVIRUS, PCR Not Detected     Coronavirus 229E Not Detected     Coronavirus HKU1 Not Detected     Coronavirus NL63 Not Detected     Coronavirus OC43 Not Detected     COVID19 Not Detected     Human Metapneumovirus Not Detected     Human Rhinovirus/Enterovirus Not Detected     Influenza A PCR Not Detected     Influenza B PCR Not Detected     Parainfluenza Virus 1 Not Detected     Parainfluenza Virus 2 Not  Detected     Parainfluenza Virus 3 Not Detected     Parainfluenza Virus 4 Not Detected     RSV, PCR Not Detected     Bordetella pertussis pcr Not Detected     Bordetella parapertussis PCR Not Detected     Chlamydophila pneumoniae PCR Not Detected     Mycoplasma pneumo by PCR Not Detected    Narrative:      In the setting of a positive respiratory panel with a viral infection PLUS a negative procalcitonin without other underlying concern for bacterial infection, consider observing off antibiotics or discontinuation of antibiotics and continue supportive care. If the respiratory panel is positive for atypical bacterial infection (Bordetella pertussis, Chlamydophila pneumoniae, or Mycoplasma pneumoniae), consider antibiotic de-escalation to target atypical bacterial infection.                  Radiology:  Imaging Results (Last 72 Hours)       ** No results found for the last 72 hours. **              Impression:   MRSA bacteremia  Norovirus gastroenteritis  Fever  Lactic acidosis concerning for tissue perfusion mismatch  Left upper arm probable thrombophlebitis and probable source of MRSA bacteremia.     PLAN/RECOMMENDATIONS:   Thank you for asking us to see Orestes Garcia Jr., I recommend the following:    Based on physical exam and radiology studies I do not see a source of MRSA bacteremia.    Unclear significance of positive blood cultures certainly MRSA in the blood needs to be treated as if it is real.    Patient clearly had nausea vomiting and diarrhea in the norovirus makes sense to explain the gastro enteritis presentation    Sometimes if patients are mid to the hospital they can have peripheral IVs infiltrated could have a skin soft tissue phlebitis which can lead to bacteremia.    Cover for MRSA with vancomycin per pharmacy dosing    And for area under the cover 400 600    Continue supportive care and appropriate isolation for gastroenteritis from norovirus    Apply warm compresses to left arm elevate left  lotus Mancuso MD saw and examined patient, reviewed labs and imaging, and formulated all medical decision making.      SONAM HendricksC for Mariano Mancuso MD    Initial blood cultures were low-grade positive for MRSA repeat blood cultures have been ordered    Left arm phlebitis is less red still indurated    Anticipate a 2-3 course of antibiotics followed by oral    Case discussed with family  PRANAY Hendricks  9/23/2024  13:27 EDT

## 2024-09-23 NOTE — PROGRESS NOTES
"          Clinical Nutrition Assessment     Patient Name: Orestes Garcia Jr.  YOB: 1940  MRN: [id number]  Date of Encounter: 09/22/24 23:20 EDT  Admission date: 9/17/2024  Reason for Visit: NPO/Clear liquid > 120hr    Assessment   Nutrition Assessment   Admission Diagnosis:  Weakness [R53.1]  Bacteremia [R78.81]    Problem List:    Weakness    Carotid artery stenosis, asymptomatic, bilateral    Hyperlipidemia LDL goal <70    Type 2 diabetes mellitus with stage 3a chronic kidney disease, without long-term current use of insulin    Benign essential hypertension    Dementia without behavioral disturbance    Norovirus    MRSA bacteremia    Bacteremia      PMH:   He  has a past medical history of Anemia, Benign essential HTN, Carotid artery stenosis (07/2020), CKD (chronic kidney disease), stage III, Colon polyp (2012), Condition not found (2018), Dementia, Depression, Diverticular disease of colon, Diverticulosis, Hearing loss, High risk medication use, IBS (irritable bowel syndrome), Mixed hyperlipidemia, Overweight (BMI 25.0-29.9), Skin cancer, Transient cerebral ischemia (2019), and Type 2 diabetes mellitus.    PSH:  He  has a past surgical history that includes Skin biopsy (Right, 2016); Colonoscopy (2012); Tonsillectomy (1950); Cataract extraction (2020); and Interventional radiology procedure (Bilateral, 8/31/2022).    Applicable Nutrition History:       Anthropometrics     Height: Height: 177.8 cm (70\")  Last Filed Weight: Weight: 87.5 kg (193 lb) (09/17/24 2049)  Method: Weight Method: Stated  BMI: BMI (Calculated): 27.7    UBW: Per EMR wt of 191 lbs on 2/16/24    Weight change: No significant changes noted at this time   Measured wt ordered.      Nutrition Focused Physical Exam    Date: 9/22    Unable to perform due to Defer pending indication     Subjective   Reported/Observed/Food/Nutrition Related History:     9/22  Pt allows at usual wt and was eating  ok PTA.    Current Nutrition Prescription "   PO: Diet: Liquid; Clear Liquid; Fluid Consistency: Thin (IDDSI 0)  Oral Nutrition Supplement:   Intake: Insufficient data 100% x 3 clr liq meals recorded    Assessment & Plan   Nutrition Diagnosis   Date:  9/22            Updated:    Problem Inadequate oral intake    Etiology Recent N/V   Signs/Symptoms NPO/Clr Liq diet > 120 hjr   Status: New      Goal / Objectives:   Nutrition to support treatment and Advance diet as medically feasible/appropriate      Nutrition Intervention      Follow treatment progress, Care plan reviewed, Encourage intake    Did not order suppl given potential for BS elevation.   Made sitcky note suggesting allow FL suppl and/or advance diet.    Monitoring/Evaluation:   Per protocol, I&O, PO intake, Pertinent labs, Weight, GI status, Symptoms    Lazara Snyder RD  Time Spent: 25 min

## 2024-09-23 NOTE — PROGRESS NOTES
[facility]  PROGRESS NOTE    Patient Name: Orestes Garcia Jr.  : 1940  MRN: [id number]    Date of Admission: 2024  Primary Care Physician: Jose Alberto Vang MD    Subjective   Subjective     CC:  Weakness     HPI:  Patient reports feeling well this morning, denies fevers, chills, sweats.  Is hopeful to leave soon.      Objective   Objective     Vital Signs:   Temp:  [97.8 °F (36.6 °C)-98.3 °F (36.8 °C)] 97.8 °F (36.6 °C)  Heart Rate:  [56-73] 73  Resp:  [16-18] 16  BP: (152-189)/() 189/100     Physical Exam:  General appearance: alert, awake, oriented, no acute distress   Cardiovascular: RRR, no murmurs or rubs, radial pulse full 2/4 BL   Respiratory: CTAB, no crackles or wheezes   Abdomen: soft, non-tender, no organomegaly, bowel sounds normoactive    Neuro/CNS: alert and oriented x3, normal speech    Results Reviewed:  LAB RESULTS:      Lab 24  0540 24  0030 24   WBC 7.54  --  9.73   HEMOGLOBIN 12.6*  --  13.2   HEMATOCRIT 37.8  --  40.4   PLATELETS 123*  --  125*   NEUTROS ABS 5.51  --  8.14*   IMMATURE GRANS (ABS) 0.04  --  0.03   LYMPHS ABS 0.84  --  0.53*   MONOS ABS 1.07*  --  0.94*   EOS ABS 0.06  --  0.07   MCV 90.6  --  92.4   LACTATE  --  1.5 2.4*         Lab 24  0810 24  0629 24  0540 24  2121   SODIUM 139 139 142 139   POTASSIUM 3.6 3.8 3.9 4.1   CHLORIDE 104 106 111* 106   CO2 25.0 23.0 21.0* 22.0   ANION GAP 10.0 10.0 10.0 11.0   BUN 13 14 20 23   CREATININE 1.19 1.20 1.35* 1.44*   EGFR 59.5* 58.9* 51.1* 47.3*   GLUCOSE 118* 81 130* 199*   CALCIUM 8.8 8.1* 7.6* 8.2*   MAGNESIUM  --   --  2.0  --    HEMOGLOBIN A1C  --   --  7.50*  --          Lab 24  2121   TOTAL PROTEIN 6.4   ALBUMIN 3.6   GLOBULIN 2.8   ALT (SGPT) 28   AST (SGOT) 47*   BILIRUBIN 0.6   ALK PHOS 85   LIPASE 108*         Lab 24  2146   HSTROP T 22*                 Brief Urine Lab Results  (Last result in the past 365  days)        Color   Clarity   Blood   Leuk Est   Nitrite   Protein   CREAT   Urine HCG        09/17/24 2132 Yellow   Cloudy   Trace   Negative   Negative   30 mg/dL (1+)                   Microbiology Results Abnormal       Procedure Component Value - Date/Time    Blood Culture - Blood, Arm, Left [[id number]]  (Normal) Collected: 09/17/24 2130    Lab Status: Final result Specimen: Blood from Arm, Left Updated: 09/23/24 0030     Blood Culture No growth at 5 days    Narrative:      Less than seven (7) mL's of blood was collected.  Insufficient quantity may yield false negative results.    Clostridioides difficile Toxin - Stool, Per Rectum [[id number]]  (Normal) Collected: 09/17/24 2341    Lab Status: Final result Specimen: Stool from Per Rectum Updated: 09/18/24 0758    Narrative:      The following orders were created for panel order Clostridioides difficile Toxin - Stool, Per Rectum.  Procedure                               Abnormality         Status                     ---------                               -----------         ------                     Clostridioides difficile...[[id number]]  Normal              Final result                 Please view results for these tests on the individual orders.    Clostridioides difficile Toxin, PCR - Stool, Per Rectum [[id number]]  (Normal) Collected: 09/17/24 2341    Lab Status: Final result Specimen: Stool from Per Rectum Updated: 09/18/24 0758     Toxigenic C. difficile by PCR Not Detected    Narrative:      The result indicates the absence of toxigenic C. difficile from stool specimen.     COVID PRE-OP / PRE-PROCEDURE SCREENING ORDER (NO ISOLATION) - Swab, Nasopharynx [[id number]]  (Normal) Collected: 09/17/24 2126    Lab Status: Final result Specimen: Swab from Nasopharynx Updated: 09/17/24 2236    Narrative:      The following orders were created for panel order COVID PRE-OP / PRE-PROCEDURE SCREENING ORDER (NO ISOLATION) - Swab, Nasopharynx.  Procedure                                Abnormality         Status                     ---------                               -----------         ------                     Respiratory Panel PCR w/...[[id number]]  Normal              Final result                 Please view results for these tests on the individual orders.    Respiratory Panel PCR w/COVID-19(SARS-CoV-2) BR[facility]/ONI/LICO/PAD/COR/ADA In-House, NP Swab in UTM/VTM, 2 HR TAT - Swab, Nasopharynx [[id number]]  (Normal) Collected: 09/17/24 2126    Lab Status: Final result Specimen: Swab from Nasopharynx Updated: 09/17/24 2236     ADENOVIRUS, PCR Not Detected     Coronavirus 229E Not Detected     Coronavirus HKU1 Not Detected     Coronavirus NL63 Not Detected     Coronavirus OC43 Not Detected     COVID19 Not Detected     Human Metapneumovirus Not Detected     Human Rhinovirus/Enterovirus Not Detected     Influenza A PCR Not Detected     Influenza B PCR Not Detected     Parainfluenza Virus 1 Not Detected     Parainfluenza Virus 2 Not Detected     Parainfluenza Virus 3 Not Detected     Parainfluenza Virus 4 Not Detected     RSV, PCR Not Detected     Bordetella pertussis pcr Not Detected     Bordetella parapertussis PCR Not Detected     Chlamydophila pneumoniae PCR Not Detected     Mycoplasma pneumo by PCR Not Detected    Narrative:      In the setting of a positive respiratory panel with a viral infection PLUS a negative procalcitonin without other underlying concern for bacterial infection, consider observing off antibiotics or discontinuation of antibiotics and continue supportive care. If the respiratory panel is positive for atypical bacterial infection (Bordetella pertussis, Chlamydophila pneumoniae, or Mycoplasma pneumoniae), consider antibiotic de-escalation to target atypical bacterial infection.            No radiology results from the last 24 hrs    Results for orders placed in visit on 07/20/22    Adult Transthoracic Echo Complete W/ Cont if Necessary Per Protocol    Interpretation  Summary  · Estimated left ventricular EF = 55% Estimated left ventricular EF was in disagreement with the calculated left ventricular EF. Left ventricular ejection fraction appears to be 56 - 60%. Left ventricular systolic function is normal.  · Left ventricular diastolic function is consistent with (grade II w/high LAP) pseudonormalization.  · Left ventricular wall thickness is consistent with mild concentric hypertrophy.  · Estimated right ventricular systolic pressure from tricuspid regurgitation is normal (<35 mmHg).  · Mild aortic sclerosis without obstruction  · The RV has normal size and function  · Trvial TR  · No pericardial effusion      Current medications:  Scheduled Meds:aspirin, 81 mg, Oral, Daily  atorvastatin, 20 mg, Oral, Nightly  busPIRone, 5 mg, Oral, BID  citalopram, 20 mg, Oral, Daily  clopidogrel, 75 mg, Oral, Daily  donepezil, 10 mg, Oral, Q PM  insulin lispro, 2-7 Units, Subcutaneous, 4x Daily AC & at Bedtime  lisinopril, 5 mg, Oral, Daily  senna-docusate sodium, 2 tablet, Oral, BID  sodium chloride, 10 mL, Intravenous, Q12H  vancomycin, 1,500 mg, Intravenous, Q24H      Continuous Infusions:Pharmacy to dose vancomycin,       PRN Meds:.  acetaminophen **OR** acetaminophen **OR** acetaminophen    senna-docusate sodium **AND** polyethylene glycol **AND** bisacodyl **AND** bisacodyl    dextrose    dextrose    glucagon (human recombinant)    hydrALAZINE    nitroglycerin    Pharmacy to dose vancomycin    Sodium Chloride (PF)    [COMPLETED] Insert Peripheral IV **AND** sodium chloride    sodium chloride    sodium chloride    traZODone    Assessment & Plan   Assessment & Plan     Active Hospital Problems    Diagnosis  POA    **Weakness [R53.1]  Yes    Norovirus [A08.11]  Yes    MRSA bacteremia [R78.81, B95.62]  Yes    Bacteremia [R78.81]  Yes    Dementia without behavioral disturbance [F03.90]  Yes    Hyperlipidemia LDL goal <70 [E78.5]  Yes    Carotid artery stenosis, asymptomatic, bilateral  [I65.23]  Yes    Type 2 diabetes mellitus with stage 3a chronic kidney disease, without long-term current use of insulin [E11.22, N18.31]  Yes    Benign essential hypertension [I10]  Yes      Resolved Hospital Problems   No resolved problems to display.     This patient's assessments and plans were partially entered by my partner and updated as appropriate by me on 9/23/2024    Brief Hospital Course to date:  Orestes Garcia Jr. is a 84 y.o. male 84-year-old male history of type 2 diabetes, CKD stage III hypertension, hyperlipidemia, dementia carotid stenosis, recent admission at OSH for acute pancreatitis for 2 days, discharged home 9/17.  He presented to [facility] with generalized weakness  s/p fall, found to have norovirus and MRSA bacteremia     Norovirus infection  MRSA bacteremia, undefined etiology  -CT abd/pelvis is unremarkable, respiratory PCR with COVID is negative, C. difficile toxin was negative, GI PCR panel is + for Norovirus  -Blood cultures, positive for MRSA,  ID consulted  -Continue vancomycin  -Repeat blood cultures to ensure clearance  -Discharge once outpatient antibiotics have been finalized and SNF established      Well-controlled type 2 diabetes A1c 7.5%  -Continue SSI     Hypertension  -BP uncontrolled, increase lisinopril to 10mg      Bilateral carotid artery disease  Hyperlipidemia  -Continue statin, aspirin and Plavix  -Follows with Dr. Rock Kelly  -Continue Aricept     Generalized weakness  Fall  -Suspect overall deconditioning from recent hospitalization  -PT/OT following, recommend rehab, awaiting insurance approval        Expected Discharge Location and Transportation: SNF  Expected Discharge   Expected Discharge Date: 9/24/2024; Expected Discharge Time:      VTE Prophylaxis:  Mechanical VTE prophylaxis orders are present.         AM-PAC 6 Clicks Score (PT): 17 (09/21/24 2035)    CODE STATUS:   Code Status and Medical Interventions: CPR (Attempt to Resuscitate); Full Support    Ordered at: 09/18/24 0105     Level Of Support Discussed With:    Next of Kin (If No Surrogate)     Code Status (Patient has no pulse and is not breathing):    CPR (Attempt to Resuscitate)     Medical Interventions (Patient has pulse or is breathing):    Full Support       Danie Mobley, DO  09/23/24

## 2024-09-24 LAB
ANION GAP SERPL CALCULATED.3IONS-SCNC: 9 MMOL/L (ref 5–15)
BUN SERPL-MCNC: 13 MG/DL (ref 8–23)
BUN/CREAT SERPL: 10.1 (ref 7–25)
CALCIUM SPEC-SCNC: 8.7 MG/DL (ref 8.6–10.5)
CHLORIDE SERPL-SCNC: 106 MMOL/L (ref 98–107)
CO2 SERPL-SCNC: 23 MMOL/L (ref 22–29)
CREAT SERPL-MCNC: 1.29 MG/DL (ref 0.76–1.27)
EGFRCR SERPLBLD CKD-EPI 2021: 54 ML/MIN/1.73
GLUCOSE BLDC GLUCOMTR-MCNC: 130 MG/DL (ref 70–130)
GLUCOSE BLDC GLUCOMTR-MCNC: 154 MG/DL (ref 70–130)
GLUCOSE BLDC GLUCOMTR-MCNC: 175 MG/DL (ref 70–130)
GLUCOSE BLDC GLUCOMTR-MCNC: 227 MG/DL (ref 70–130)
GLUCOSE SERPL-MCNC: 142 MG/DL (ref 65–99)
POTASSIUM SERPL-SCNC: 3.6 MMOL/L (ref 3.5–5.2)
SODIUM SERPL-SCNC: 138 MMOL/L (ref 136–145)
VANCOMYCIN SERPL-MCNC: 17.4 MCG/ML (ref 5–40)

## 2024-09-24 PROCEDURE — 80202 ASSAY OF VANCOMYCIN: CPT

## 2024-09-24 PROCEDURE — 80048 BASIC METABOLIC PNL TOTAL CA: CPT

## 2024-09-24 PROCEDURE — 99232 SBSQ HOSP IP/OBS MODERATE 35: CPT | Performed by: STUDENT IN AN ORGANIZED HEALTH CARE EDUCATION/TRAINING PROGRAM

## 2024-09-24 PROCEDURE — 02HV33Z INSERTION OF INFUSION DEVICE INTO SUPERIOR VENA CAVA, PERCUTANEOUS APPROACH: ICD-10-PCS | Performed by: INTERNAL MEDICINE

## 2024-09-24 PROCEDURE — 63710000001 INSULIN LISPRO (HUMAN) PER 5 UNITS: Performed by: NURSE PRACTITIONER

## 2024-09-24 PROCEDURE — 25010000002 VANCOMYCIN HCL IN NACL 1.5-0.9 GM/500ML-% SOLUTION

## 2024-09-24 PROCEDURE — 82948 REAGENT STRIP/BLOOD GLUCOSE: CPT

## 2024-09-24 RX ORDER — ONDANSETRON 2 MG/ML
4 INJECTION INTRAMUSCULAR; INTRAVENOUS EVERY 6 HOURS PRN
Status: DISCONTINUED | OUTPATIENT
Start: 2024-09-24 | End: 2024-09-25 | Stop reason: HOSPADM

## 2024-09-24 RX ORDER — FAMOTIDINE 20 MG/1
20 TABLET, FILM COATED ORAL
Status: DISCONTINUED | OUTPATIENT
Start: 2024-09-24 | End: 2024-09-25 | Stop reason: HOSPADM

## 2024-09-24 RX ORDER — AMLODIPINE BESYLATE 5 MG/1
5 TABLET ORAL
Status: DISCONTINUED | OUTPATIENT
Start: 2024-09-24 | End: 2024-09-25 | Stop reason: HOSPADM

## 2024-09-24 RX ADMIN — CLOPIDOGREL BISULFATE 75 MG: 75 TABLET ORAL at 08:35

## 2024-09-24 RX ADMIN — INSULIN LISPRO 2 UNITS: 100 INJECTION, SOLUTION INTRAVENOUS; SUBCUTANEOUS at 12:50

## 2024-09-24 RX ADMIN — CITALOPRAM HYDROBROMIDE 20 MG: 20 TABLET ORAL at 08:35

## 2024-09-24 RX ADMIN — INSULIN LISPRO 2 UNITS: 100 INJECTION, SOLUTION INTRAVENOUS; SUBCUTANEOUS at 17:31

## 2024-09-24 RX ADMIN — BUSPIRONE HYDROCHLORIDE 5 MG: 10 TABLET ORAL at 20:00

## 2024-09-24 RX ADMIN — BUSPIRONE HYDROCHLORIDE 5 MG: 10 TABLET ORAL at 08:36

## 2024-09-24 RX ADMIN — Medication 10 ML: at 20:01

## 2024-09-24 RX ADMIN — DONEPEZIL HYDROCHLORIDE 10 MG: 10 TABLET, FILM COATED ORAL at 20:00

## 2024-09-24 RX ADMIN — AMLODIPINE BESYLATE 5 MG: 5 TABLET ORAL at 08:35

## 2024-09-24 RX ADMIN — ASPIRIN 81 MG: 81 TABLET, COATED ORAL at 08:35

## 2024-09-24 RX ADMIN — Medication 1500 MG: at 08:46

## 2024-09-24 RX ADMIN — ATORVASTATIN CALCIUM 20 MG: 20 TABLET, FILM COATED ORAL at 20:00

## 2024-09-24 RX ADMIN — FAMOTIDINE 20 MG: 20 TABLET, FILM COATED ORAL at 18:34

## 2024-09-24 RX ADMIN — LISINOPRIL 10 MG: 10 TABLET ORAL at 08:35

## 2024-09-24 NOTE — PROGRESS NOTES
[facility] Medicine Services  PROGRESS NOTE    Patient Name: Orestes Garcia Jr.  : 1940  MRN: [id number]    Date of Admission: 2024  Primary Care Physician: Jose Alberto Vang MD    Subjective   Subjective     CC:  Weakness     HPI:  Patient reports anxiety over his medical condition which was resolved after explanation that he is responding well to his therapies.  He otherwise has no complaints.  Admits to mild soreness in his left arm but denies fevers, chills, sweats.      Objective   Objective     Vital Signs:   Temp:  [98 °F (36.7 °C)-98.6 °F (37 °C)] 98.1 °F (36.7 °C)  Heart Rate:  [51-66] 56  Resp:  [16] 16  BP: (153-186)/() 155/61     Physical Exam:  General appearance: alert, awake, oriented, no acute distress   Cardiovascular: RRR, no murmurs or rubs, radial pulse full 2/4 BL   Respiratory: CTAB, no crackles or wheezes   Abdomen: soft, non-tender, no organomegaly, bowel sounds normoactive    Neuro/CNS: alert and oriented x3, normal speech  Skin: Left AC with erythema, induration, tenderness to palpation    Results Reviewed:  LAB RESULTS:      Lab 24  1350 24  0540 24  0030 24  2121   WBC 8.26 7.54  --  9.73   HEMOGLOBIN 14.1 12.6*  --  13.2   HEMATOCRIT 42.6 37.8  --  40.4   PLATELETS 213 123*  --  125*   NEUTROS ABS 6.05 5.51  --  8.14*   IMMATURE GRANS (ABS) 0.04 0.04  --  0.03   LYMPHS ABS 1.13 0.84  --  0.53*   MONOS ABS 0.73 1.07*  --  0.94*   EOS ABS 0.25 0.06  --  0.07   MCV 89.9 90.6  --  92.4   LACTATE  --   --  1.5 2.4*         Lab 24  0551 24  1351 24  0810 24  0629 24  0540   SODIUM 138 141 139 139 142   POTASSIUM 3.6 4.0 3.6 3.8 3.9   CHLORIDE 106 105 104 106 111*   CO2 23.0 24.0 25.0 23.0 21.0*   ANION GAP 9.0 12.0 10.0 10.0 10.0   BUN 13 12 13 14 20   CREATININE 1.29* 1.21 1.19 1.20 1.35*   EGFR 54.0* 58.3* 59.5* 58.9* 51.1*   GLUCOSE 142* 120* 118* 81 130*   CALCIUM 8.7 9.0 8.8 8.1* 7.6*    MAGNESIUM  --   --   --   --  2.0   HEMOGLOBIN A1C  --   --   --   --  7.50*         Lab 09/17/24 2121   TOTAL PROTEIN 6.4   ALBUMIN 3.6   GLOBULIN 2.8   ALT (SGPT) 28   AST (SGOT) 47*   BILIRUBIN 0.6   ALK PHOS 85   LIPASE 108*         Lab 09/17/24 2146   HSTROP T 22*                 Brief Urine Lab Results  (Last result in the past 365 days)        Color   Clarity   Blood   Leuk Est   Nitrite   Protein   CREAT   Urine HCG        09/17/24 2132 Yellow   Cloudy   Trace   Negative   Negative   30 mg/dL (1+)                   Microbiology Results Abnormal       Procedure Component Value - Date/Time    Blood Culture - Blood, Arm, Left [[id number]]  (Normal) Collected: 09/17/24 2130    Lab Status: Final result Specimen: Blood from Arm, Left Updated: 09/23/24 0030     Blood Culture No growth at 5 days    Narrative:      Less than seven (7) mL's of blood was collected.  Insufficient quantity may yield false negative results.    Clostridioides difficile Toxin - Stool, Per Rectum [[id number]]  (Normal) Collected: 09/17/24 2341    Lab Status: Final result Specimen: Stool from Per Rectum Updated: 09/18/24 0758    Narrative:      The following orders were created for panel order Clostridioides difficile Toxin - Stool, Per Rectum.  Procedure                               Abnormality         Status                     ---------                               -----------         ------                     Clostridioides difficile...[[id number]]  Normal              Final result                 Please view results for these tests on the individual orders.    Clostridioides difficile Toxin, PCR - Stool, Per Rectum [[id number]]  (Normal) Collected: 09/17/24 2341    Lab Status: Final result Specimen: Stool from Per Rectum Updated: 09/18/24 0758     Toxigenic C. difficile by PCR Not Detected    Narrative:      The result indicates the absence of toxigenic C. difficile from stool specimen.     COVID PRE-OP / PRE-PROCEDURE SCREENING ORDER  (NO ISOLATION) - Swab, Nasopharynx [[id number]]  (Normal) Collected: 09/17/24 2126    Lab Status: Final result Specimen: Swab from Nasopharynx Updated: 09/17/24 2236    Narrative:      The following orders were created for panel order COVID PRE-OP / PRE-PROCEDURE SCREENING ORDER (NO ISOLATION) - Swab, Nasopharynx.  Procedure                               Abnormality         Status                     ---------                               -----------         ------                     Respiratory Panel PCR w/...[[id number]]  Normal              Final result                 Please view results for these tests on the individual orders.    Respiratory Panel PCR w/COVID-19(SARS-CoV-2) BRIG[facility]/ONI/LICO/PAD/COR/ADA In-House, NP Swab in UTM/VTM, 2 HR TAT - Swab, Nasopharynx [[id number]]  (Normal) Collected: 09/17/24 2126    Lab Status: Final result Specimen: Swab from Nasopharynx Updated: 09/17/24 2236     ADENOVIRUS, PCR Not Detected     Coronavirus 229E Not Detected     Coronavirus HKU1 Not Detected     Coronavirus NL63 Not Detected     Coronavirus OC43 Not Detected     COVID19 Not Detected     Human Metapneumovirus Not Detected     Human Rhinovirus/Enterovirus Not Detected     Influenza A PCR Not Detected     Influenza B PCR Not Detected     Parainfluenza Virus 1 Not Detected     Parainfluenza Virus 2 Not Detected     Parainfluenza Virus 3 Not Detected     Parainfluenza Virus 4 Not Detected     RSV, PCR Not Detected     Bordetella pertussis pcr Not Detected     Bordetella parapertussis PCR Not Detected     Chlamydophila pneumoniae PCR Not Detected     Mycoplasma pneumo by PCR Not Detected    Narrative:      In the setting of a positive respiratory panel with a viral infection PLUS a negative procalcitonin without other underlying concern for bacterial infection, consider observing off antibiotics or discontinuation of antibiotics and continue supportive care. If the respiratory panel is positive for atypical bacterial  infection (Bordetella pertussis, Chlamydophila pneumoniae, or Mycoplasma pneumoniae), consider antibiotic de-escalation to target atypical bacterial infection.            No radiology results from the last 24 hrs    Results for orders placed in visit on 07/20/22    Adult Transthoracic Echo Complete W/ Cont if Necessary Per Protocol    Interpretation Summary  · Estimated left ventricular EF = 55% Estimated left ventricular EF was in disagreement with the calculated left ventricular EF. Left ventricular ejection fraction appears to be 56 - 60%. Left ventricular systolic function is normal.  · Left ventricular diastolic function is consistent with (grade II w/high LAP) pseudonormalization.  · Left ventricular wall thickness is consistent with mild concentric hypertrophy.  · Estimated right ventricular systolic pressure from tricuspid regurgitation is normal (<35 mmHg).  · Mild aortic sclerosis without obstruction  · The RV has normal size and function  · Trvial TR  · No pericardial effusion      Current medications:  Scheduled Meds:amLODIPine, 5 mg, Oral, Q24H  aspirin, 81 mg, Oral, Daily  atorvastatin, 20 mg, Oral, Nightly  busPIRone, 5 mg, Oral, BID  citalopram, 20 mg, Oral, Daily  clopidogrel, 75 mg, Oral, Daily  donepezil, 10 mg, Oral, Q PM  insulin lispro, 2-7 Units, Subcutaneous, 4x Daily AC & at Bedtime  lisinopril, 10 mg, Oral, Daily  senna-docusate sodium, 2 tablet, Oral, BID  sodium chloride, 10 mL, Intravenous, Q12H  vancomycin, 1,500 mg, Intravenous, Q24H      Continuous Infusions:Pharmacy to dose vancomycin,       PRN Meds:.  acetaminophen **OR** acetaminophen **OR** acetaminophen    senna-docusate sodium **AND** polyethylene glycol **AND** bisacodyl **AND** bisacodyl    dextrose    dextrose    glucagon (human recombinant)    hydrALAZINE    nitroglycerin    Pharmacy to dose vancomycin    Sodium Chloride (PF)    [COMPLETED] Insert Peripheral IV **AND** sodium chloride    sodium chloride    sodium chloride     traZODone    Assessment & Plan   Assessment & Plan     Active Hospital Problems    Diagnosis  POA    **Weakness [R53.1]  Yes    Norovirus [A08.11]  Yes    MRSA bacteremia [R78.81, B95.62]  Yes    Bacteremia [R78.81]  Yes    Dementia without behavioral disturbance [F03.90]  Yes    Hyperlipidemia LDL goal <70 [E78.5]  Yes    Carotid artery stenosis, asymptomatic, bilateral [I65.23]  Yes    Type 2 diabetes mellitus with stage 3a chronic kidney disease, without long-term current use of insulin [E11.22, N18.31]  Yes    Benign essential hypertension [I10]  Yes      Resolved Hospital Problems   No resolved problems to display.        Brief Hospital Course to date:  Orestes Garcia Jr. is a 84 y.o. male 84-year-old male history of type 2 diabetes, CKD stage III hypertension, hyperlipidemia, dementia carotid stenosis, recent admission at OSH for acute pancreatitis for 2 days, discharged home 9/17.  He presented to [facility] with generalized weakness  s/p fall, found to have norovirus and MRSA bacteremia     Norovirus infection  MRSA bacteremia, undefined etiology  Left Upper Extremity Thrombophlebitis   -CT abd/pelvis is unremarkable, respiratory PCR with COVID is negative, C. difficile toxin was negative, GI PCR panel is + for Norovirus  -Blood cultures, positive for MRSA,  ID consulted  -Continue vancomycin, sensitivities reveal resistance to oxacillin   -Repeat blood cultures to ensure clearance, pending   -Discharge once outpatient antibiotics have been finalized and need for PICC established      Well-controlled type 2 diabetes A1c 7.5%  -Continue SSI     Hypertension  -BP uncontrolled, increased lisinopril to 10mg   -Add amlodipine      Bilateral carotid artery disease  Hyperlipidemia  -Continue statin, aspirin and Plavix  -Follows with Dr. Wlikerson     Dementia  -Continue Aricept     Generalized weakness  Fall  -Suspect overall deconditioning from recent hospitalization  -PT/OT following, recommend rehab, awaiting insurance  approval        Expected Discharge Location and Transportation: Rehab   Expected Discharge   Expected Discharge Date: 9/25/2024; Expected Discharge Time:      VTE Prophylaxis:  Mechanical VTE prophylaxis orders are present.         AM-PAC 6 Clicks Score (PT): 17 (09/21/24 2035)    CODE STATUS:   Code Status and Medical Interventions: CPR (Attempt to Resuscitate); Full Support   Ordered at: 09/18/24 0105     Level Of Support Discussed With:    Next of Kin (If No Surrogate)     Code Status (Patient has no pulse and is not breathing):    CPR (Attempt to Resuscitate)     Medical Interventions (Patient has pulse or is breathing):    Full Support       Danie Mobley, DO  09/24/24

## 2024-09-24 NOTE — PROGRESS NOTES
"Pharmacy Consult - Vancomycin Dosing and Monitoring    Orestes Garcia Jr. is a 84 y.o. male receiving vancomycin therapy.     Indication: MRSA bacteremia  Consulting Provider: Hospitalist  ID Consult: Yes - Dr. Mancuso     Goal AUC: 400-600 mg/L*hr    Current Antimicrobial Therapy  Anti-Infectives (From admission, onward)      Ordered     Dose/Rate Route Frequency Start Stop    09/21/24 0856  vancomycin IVPB 1500 mg in 0.9% NaCl (Premix) 500 mL        Ordering Provider: Rocio Schmidt, PharmD    1,500 mg  333.3 mL/hr over 90 Minutes Intravenous Every 24 Hours 09/21/24 0945 09/27/24 0859    09/19/24 0957  vancomycin IVPB 1750 mg in 0.9% Sodium Chloride (premix) 500 mL        Ordering Provider: Tod Hodges RPH    1,750 mg  285.7 mL/hr over 105 Minutes Intravenous Once 09/19/24 1045 09/19/24 1247    09/19/24 0938  Pharmacy to dose vancomycin        Ordering Provider: Jonel Eduardo MD     Does not apply Continuous PRN 09/19/24 0937 09/26/24 0936          Allergies  Allergies as of 09/17/2024    (No Known Allergies)     Labs  Results from last 7 days   Lab Units 09/24/24  0551 09/23/24  1351 09/21/24  0810   BUN mg/dL 13 12 13   CREATININE mg/dL 1.29* 1.21 1.19     Results from last 7 days   Lab Units 09/23/24  1350 09/18/24  0540 09/17/24  2121   WBC 10*3/mm3 8.26 7.54 9.73     Evaluation of Dosing     Last Dose Received in the ED/Outside Facility: N/A  Is Patient on Dialysis or Renal Replacement: No    Height - 177.8 cm (70\")  Weight - 87.8 kg (193 lb 8 oz)    Estimated Creatinine Clearance: 45.9 mL/min (A) (by C-G formula based on SCr of 1.29 mg/dL (H)).    I/O last 3 completed shifts:  In: -   Out: 2200 [Urine:2200]    Microbiology and Radiology  Microbiology Results (last 10 days)       Procedure Component Value - Date/Time    Gastrointestinal Panel, PCR - Stool, Per Rectum [[id number]]  (Abnormal) Collected: 09/17/24 1264    Lab Status: Final result Specimen: Stool from Per Rectum Updated: 09/18/24 0844     " Campylobacter Not Detected     Plesiomonas shigelloides Not Detected     Salmonella Not Detected     Vibrio Not Detected     Vibrio cholerae Not Detected     Yersinia enterocolitica Not Detected     Enteroaggregative E. coli (EAEC) Not Detected     Enteropathogenic E. coli (EPEC) Not Detected     Enterotoxigenic E. coli (ETEC) lt/st Not Detected     Shiga-like toxin-producing E. coli (STEC) stx1/stx2 Not Detected     Shigella/Enteroinvasive E. coli (EIEC) Not Detected     Cryptosporidium Not Detected     Cyclospora cayetanensis Not Detected     Entamoeba histolytica Not Detected     Giardia lamblia Not Detected     Adenovirus F40/41 Not Detected     Astrovirus Not Detected     Norovirus GI/GII Detected     Comment: If a positive Norovirus result is inconsistent with clinical presentation, the positive Norovirus result should be confirmed using another method.        Rotavirus A Not Detected     Sapovirus (I, II, IV or V) Not Detected    Clostridioides difficile Toxin - Stool, Per Rectum [[id number]]  (Normal) Collected: 09/17/24 2341    Lab Status: Final result Specimen: Stool from Per Rectum Updated: 09/18/24 0758    Narrative:      The following orders were created for panel order Clostridioides difficile Toxin - Stool, Per Rectum.  Procedure                               Abnormality         Status                     ---------                               -----------         ------                     Clostridioides difficile...[[id number]]  Normal              Final result                 Please view results for these tests on the individual orders.    Clostridioides difficile Toxin, PCR - Stool, Per Rectum [[id number]]  (Normal) Collected: 09/17/24 2341    Lab Status: Final result Specimen: Stool from Per Rectum Updated: 09/18/24 0758     Toxigenic C. difficile by PCR Not Detected    Narrative:      The result indicates the absence of toxigenic C. difficile from stool specimen.     Blood Culture - Blood, Hand,  Left [[id number]]  (Abnormal)  (Susceptibility) Collected: 09/17/24 2140    Lab Status: Final result Specimen: Blood from Hand, Left Updated: 09/23/24 0640     Blood Culture Staphylococcus aureus, MRSA     Comment: Infectious disease consultation is highly recommended to rule out distant foci of infection.  Methicillin resistant Staphylococcus aureus, Patient may be an isolation risk.          Isolated from Anaerobic Bottle     Gram Stain Anaerobic Bottle Gram positive cocci in pairs and clusters    Narrative:      Less than seven (7) mL's of blood was collected.  Insufficient quantity may yield false negative results.    Susceptibility        Staphylococcus aureus, MRSA      ELO      Gentamicin <=0.5 ug/ml Susceptible      Oxacillin >=4 ug/ml Resistant      Rifampin <=0.5 ug/ml Susceptible      Vancomycin 1 ug/ml Susceptible                           Blood Culture ID, PCR - Blood, Hand, Left [[id number]]  (Abnormal) Collected: 09/17/24 2140    Lab Status: Final result Specimen: Blood from Hand, Left Updated: 09/19/24 0815     BCID, PCR Staph aureus. mecA/C and MREJ (methicillin resistance gene) detected. Identification by BCID2 PCR.     BOTTLE TYPE Anaerobic Bottle    Narrative:      Infectious disease consultation is highly recommended to rule out distant foci of infection.    Blood Culture - Blood, Arm, Left [[id number]]  (Normal) Collected: 09/17/24 2130    Lab Status: Final result Specimen: Blood from Arm, Left Updated: 09/23/24 0030     Blood Culture No growth at 5 days    Narrative:      Less than seven (7) mL's of blood was collected.  Insufficient quantity may yield false negative results.    COVID PRE-OP / PRE-PROCEDURE SCREENING ORDER (NO ISOLATION) - Swab, Nasopharynx [[id number]]  (Normal) Collected: 09/17/24 2126    Lab Status: Final result Specimen: Swab from Nasopharynx Updated: 09/17/24 2236    Narrative:      The following orders were created for panel order COVID PRE-OP / PRE-PROCEDURE SCREENING ORDER  (NO ISOLATION) - Swab, Nasopharynx.  Procedure                               Abnormality         Status                     ---------                               -----------         ------                     Respiratory Panel PCR w/...[[id number]]  Normal              Final result                 Please view results for these tests on the individual orders.    Respiratory Panel PCR w/COVID-19(SARS-CoV-2) [facility]/ON[facility]/L[facility]ADA In-House, NP Swab in UTM/VTM, 2 HR TAT - Swab, Nasopharynx [[id number]]  (Normal) Collected: 09/17/24 2126    Lab Status: Final result Specimen: Swab from Nasopharynx Updated: 09/17/24 2236     ADENOVIRUS, PCR Not Detected     Coronavirus 229E Not Detected     Coronavirus HKU1 Not Detected     Coronavirus NL63 Not Detected     Coronavirus OC43 Not Detected     COVID19 Not Detected     Human Metapneumovirus Not Detected     Human Rhinovirus/Enterovirus Not Detected     Influenza A PCR Not Detected     Influenza B PCR Not Detected     Parainfluenza Virus 1 Not Detected     Parainfluenza Virus 2 Not Detected     Parainfluenza Virus 3 Not Detected     Parainfluenza Virus 4 Not Detected     RSV, PCR Not Detected     Bordetella pertussis pcr Not Detected     Bordetella parapertussis PCR Not Detected     Chlamydophila pneumoniae PCR Not Detected     Mycoplasma pneumo by PCR Not Detected    Narrative:      In the setting of a positive respiratory panel with a viral infection PLUS a negative procalcitonin without other underlying concern for bacterial infection, consider observing off antibiotics or discontinuation of antibiotics and continue supportive care. If the respiratory panel is positive for atypical bacterial infection (Bordetella pertussis, Chlamydophila pneumoniae, or Mycoplasma pneumoniae), consider antibiotic de-escalation to target atypical bacterial infection.          Reported Vancomycin Levels  Results from last 7 days   Lab Units 09/24/24  0551 09/21/24  0810   VANCOMYCIN RM mcg/mL  17.40 10.00     [facility] AUC Calculation:    Current AUC: 510 mg/L*hr    Predicted Steady State AUC on Current Dose: 569 mg/L*hr  _________________________________    Predicted Steady State AUC on New Dose: -- mg/L*hr    Assessment/Plan:  Pharmacy to dose vancomycin for MRSA bacteremia. Goal -600 mg/L*hr.  Patient continues on maintenance dose of vancomycin 1500mg (~17mg/kg) IV Q24hr. Trough level this AM resulted 17.4mcg/mL which correlates to a therapeutic AUC of 510 mg/L*hr.   Renal function remains relatively stable with adequate UOP. Continue current regimen and reassess clearance by trough level in 2-3 days, if patient is still admitted.    Pharmacy will continue to monitor renal function, cultures and sensitivities, and clinical status to adjust regimen as necessary.    Taylor Riedel-Rogers, PharmD, BCPS  9/24/2024  07:28 EDT

## 2024-09-24 NOTE — PROGRESS NOTES
INFECTIOUS DISEASE f/u     Orestes Garcia Jr.  1940  [id number]      Admission Date: 9/17/2024    Reason for Consultation: MRSA bacteremia    History of present illness:    Patient is a 84 y.o. male  With hypertension, hyperlipidemia, coronary carotid artery disease, chronic kidney disease, dementia, diabetes mellitus type 2 has had nausea vomiting diarrhea x 3 days patient received medical care at [facility] for pancreatitis patient discharged home but then had generalized weakness patient given fluids and admitted to hospital for further observation has had normal CT scan abdomen pelvis and chest x-ray showed bilateral pleural effusions because of positive blood cultures for MRSA we are being consulted for further evaluation    Patient had a positive GI PCR panel for norovirus    Patient denies any pain in his arms from previous IV sites patient has any indwelling hardware denies back pain as well    9/20/24 has left arm redness, pain at piv site; no complaints    9/21/2024 patient is awake alert no complaints poor historian denies pain in his left arm patient is afebrile and has intermittent hypertension    9/22/2024 afebrile no events overnight normotensive    9/23/24: Afebrile. No new events overnight.  Very [place].  Poor historian and unable to provide a reliable history.  Denies worsening diarrhea.  Left arm with indurated and warmth at site of prior IV line, likely thrombophlebitis.     9/24/24; doing well; no evnets overnight no fever, rash, sore throat    Past Medical History:   Diagnosis Date    Anemia     Benign essential HTN     Carotid artery stenosis 07/2020    RIGHT CAROTID  40-60% STENOSIS ON DOPPLER RECORD SAYS H/O TIA BUT PT DENIES THIS??    CKD (chronic kidney disease), stage III     Colon polyp 2012    LAST C QUESTIONABLE    Condition not found 2018    COLOGUARD NEG 9-18    Dementia     Depression     Diverticular disease of colon     WITHOUT HEMORRHAGE     Diverticulosis     WITH LOWR GI BLEED APPROS AGE 49    Hearing loss     High risk medication use     IBS (irritable bowel syndrome)     WITH DIARRHEA    Mixed hyperlipidemia     Overweight (BMI 25.0-29.9)     Skin cancer     LEG    Transient cerebral ischemia 2019    POSSIBLE A/W FALL    Type 2 diabetes mellitus     WITH STAGE 3 CKD WITHOUT LONG TERM CURRENT USE OF INSULIN   WITH POTEINURIA       Past Surgical History:   Procedure Laterality Date    CATARACT EXTRACTION  2020    COLONOSCOPY  2012    [facility]    INTERVENTIONAL RADIOLOGY PROCEDURE Bilateral 8/31/2022    Procedure: Carotid Cerebral Angiogram;  Surgeon: Stan Falk MD;  Location: [facility] INVASIVE LOCATION;  Service: Interventional Radiology;  Laterality: Bilateral;    SKIN BIOPSY Right 2016    LEG    TONSILLECTOMY  1950       Family History   Problem Relation Age of Onset    Cancer Mother     Hypertension Mother     Pancreatic cancer Mother     Alzheimer's disease Father     Coronary artery disease Father 86    Diabetes Father     Hearing loss Father        Social History     Socioeconomic History    Marital status:    Tobacco Use    Smoking status: Never    Smokeless tobacco: Never   Vaping Use    Vaping status: Never Used   Substance and Sexual Activity    Alcohol use: Never    Drug use: Never    Sexual activity: Defer       No Known Allergies      Medication:    Current Facility-Administered Medications:     acetaminophen (TYLENOL) tablet 650 mg, 650 mg, Oral, Q4H PRN, 650 mg at 09/21/24 2005 **OR** acetaminophen (TYLENOL) 160 MG/5ML oral solution 650 mg, 650 mg, Oral, Q4H PRN **OR** acetaminophen (TYLENOL) suppository 650 mg, 650 mg, Rectal, Q4H PRN, Micaela Alcantara, APRN    amLODIPine (NORVASC) tablet 5 mg, 5 mg, Oral, Q24H, Danie Mobley DO, 5 mg at 09/24/24 0835    aspirin EC tablet 81 mg, 81 mg, Oral, Daily, Micaela Alcantara APRN, 81 mg at 09/24/24 0835    atorvastatin (LIPITOR) tablet 20 mg, 20 mg, Oral, Nightly,  Micaela Alcantara APRN, 20 mg at 09/23/24 2127    sennosides-docusate (PERICOLACE) 8.6-50 MG per tablet 2 tablet, 2 tablet, Oral, BID, 2 tablet at 09/22/24 0900 **AND** polyethylene glycol (MIRALAX) packet 17 g, 17 g, Oral, Daily PRN **AND** bisacodyl (DULCOLAX) EC tablet 5 mg, 5 mg, Oral, Daily PRN, 5 mg at 09/20/24 1745 **AND** bisacodyl (DULCOLAX) suppository 10 mg, 10 mg, Rectal, Daily PRN, Jonel Eduardo MD    busPIRone (BUSPAR) tablet 5 mg, 5 mg, Oral, BID, Micaela Alcantara, APRN, 5 mg at 09/24/24 0836    citalopram (CeleXA) tablet 20 mg, 20 mg, Oral, Daily, Jonel Eduardo MD, 20 mg at 09/24/24 0835    clopidogrel (PLAVIX) tablet 75 mg, 75 mg, Oral, Daily, Micaela Alcantara APRN, 75 mg at 09/24/24 0835    dextrose (D50W) (25 g/50 mL) IV injection 25 g, 25 g, Intravenous, Q15 Min PRN, Micaela Alcatnara APRN    dextrose (GLUTOSE) oral gel 15 g, 15 g, Oral, Q15 Min PRN, Micaela Alcantara APRN    donepezil (ARICEPT) tablet 10 mg, 10 mg, Oral, Q PM, Micaela Alcantara APRN, 10 mg at 09/23/24 2127    glucagon (GLUCAGEN) injection 1 mg, 1 mg, Intramuscular, Q15 Min PRN, Micaela Alcantara APRN    hydrALAZINE (APRESOLINE) injection 20 mg, 20 mg, Intravenous, Q6H PRN, Jonel Eduardo MD    Insulin Lispro (humaLOG) injection 2-7 Units, 2-7 Units, Subcutaneous, 4x Daily AC & at Bedtime, Micaela Alcantara APRN, 2 Units at 09/24/24 1250    lisinopril (PRINIVIL,ZESTRIL) tablet 10 mg, 10 mg, Oral, Daily, Danie Mobley, DO, 10 mg at 09/24/24 0835    nitroglycerin (NITROSTAT) SL tablet 0.4 mg, 0.4 mg, Sublingual, Q5 Min PRN, Micaela Alcantara, RANI    Pharmacy to dose vancomycin, , Does not apply, Continuous PRN, Jonel Eduardo MD    Sodium Chloride (PF) 0.9 % 10 mL, 10 mL, Intravenous, PRN, Herlinda Quispe MD    [COMPLETED] Insert Peripheral IV, , , Once **AND** sodium chloride 0.9 % flush 10 mL, 10 mL, Intravenous, PRN, Herlinda Quispe MD    sodium chloride 0.9 % flush 10  mL, 10 mL, Intravenous, Q12H, Micaela Alcantara, APRN, 10 mL at 24    sodium chloride 0.9 % flush 10 mL, 10 mL, Intravenous, PRN, Micaela Alcantara, APRN    sodium chloride 0.9 % infusion 40 mL, 40 mL, Intravenous, PRN, Micaela Alcantara W, APRN    traZODone (DESYREL) tablet 50 mg, 50 mg, Oral, Nightly PRN, Micaela Alcantara APRN, 50 mg at 24    vancomycin IVPB 1500 mg in 0.9% NaCl (Premix) 500 mL, 1,500 mg, Intravenous, Q24H, Rocio Schmidt, PharmD, Last Rate: 333.3 mL/hr at 24 0846, 1,500 mg at 24 0846    Antibiotics:  Anti-Infectives (From admission, onward)      Ordered     Dose/Rate Route Frequency Start Stop    24 0856  vancomycin IVPB 1500 mg in 0.9% NaCl (Premix) 500 mL        Ordering Provider: Rocio Schmidt, PharmD    1,500 mg  333.3 mL/hr over 90 Minutes Intravenous Every 24 Hours 24 0945 24 0859    24 0957  vancomycin IVPB 1750 mg in 0.9% Sodium Chloride (premix) 500 mL        Ordering Provider: Tod Hodges RPH    1,750 mg  285.7 mL/hr over 105 Minutes Intravenous Once 24 1045 24 1247    24 0938  Pharmacy to dose vancomycin        Ordering Provider: Jonel Eduardo MD     Does not apply Continuous PRN 24 0937 24 0936              Review of Systems:  See HPI    Physical Exam:   Vital Signs  Temp (24hrs), Av.2 °F (36.8 °C), Min:98 °F (36.7 °C), Max:98.6 °F (37 °C)    Temp  Min: 98 °F (36.7 °C)  Max: 98.6 °F (37 °C)  BP  Min: 153/78  Max: 186/86  Pulse  Min: 51  Max: 66  Resp  Min: 16  Max: 16  SpO2  Min: 82 %  Max: 98 %    GENERAL: Awake and alert, in no acute distress.   HEENT: Normocephalic, atraumatic.  PERRL. EOMI. No conjunctival injection. No icterus.  No external oral lesions  ABDOMEN: Soft, nontender,  EXT:  No cyanosis, clubbing or edema. No cord.  :  Without Calderon catheter.  MSK: No joint effusions. Left medial upper arm with erythema, induration, warmth, no tenderness.   SKIN: No  rash      Laboratory Data    Results from last 7 days   Lab Units 09/23/24  1350 09/18/24  0540 09/17/24  2121   WBC 10*3/mm3 8.26 7.54 9.73   HEMOGLOBIN g/dL 14.1 12.6* 13.2   HEMATOCRIT % 42.6 37.8 40.4   PLATELETS 10*3/mm3 213 123* 125*     Results from last 7 days   Lab Units 09/24/24  0551   SODIUM mmol/L 138   POTASSIUM mmol/L 3.6   CHLORIDE mmol/L 106   CO2 mmol/L 23.0   BUN mg/dL 13   CREATININE mg/dL 1.29*   GLUCOSE mg/dL 142*   CALCIUM mg/dL 8.7     Results from last 7 days   Lab Units 09/17/24  2121   ALK PHOS U/L 85   BILIRUBIN mg/dL 0.6   ALT (SGPT) U/L 28   AST (SGOT) U/L 47*             Results from last 7 days   Lab Units 09/18/24  0030   LACTATE mmol/L 1.5         Results from last 7 days   Lab Units 09/24/24  0551 09/21/24  0810   VANCOMYCIN RM mcg/mL 17.40 10.00     Estimated Creatinine Clearance: 45.9 mL/min (A) (by C-G formula based on SCr of 1.29 mg/dL (H)).      Microbiology:  Microbiology Results (last 10 days)       Procedure Component Value - Date/Time    Blood Culture - Blood, Arm, Left [[id number]]  (Normal) Collected: 09/23/24 1351    Lab Status: Preliminary result Specimen: Blood from Arm, Left Updated: 09/24/24 1515     Blood Culture No growth at 24 hours    Gastrointestinal Panel, PCR - Stool, Per Rectum [[id number]]  (Abnormal) Collected: 09/17/24 2341    Lab Status: Final result Specimen: Stool from Per Rectum Updated: 09/18/24 0844     Campylobacter Not Detected     Plesiomonas shigelloides Not Detected     Salmonella Not Detected     Vibrio Not Detected     Vibrio cholerae Not Detected     Yersinia enterocolitica Not Detected     Enteroaggregative E. coli (EAEC) Not Detected     Enteropathogenic E. coli (EPEC) Not Detected     Enterotoxigenic E. coli (ETEC) lt/st Not Detected     Shiga-like toxin-producing E. coli (STEC) stx1/stx2 Not Detected     Shigella/Enteroinvasive E. coli (EIEC) Not Detected     Cryptosporidium Not Detected     Cyclospora cayetanensis Not Detected      Entamoeba histolytica Not Detected     Giardia lamblia Not Detected     Adenovirus F40/41 Not Detected     Astrovirus Not Detected     Norovirus GI/GII Detected     Comment: If a positive Norovirus result is inconsistent with clinical presentation, the positive Norovirus result should be confirmed using another method.        Rotavirus A Not Detected     Sapovirus (I, II, IV or V) Not Detected    Clostridioides difficile Toxin - Stool, Per Rectum [[id number]]  (Normal) Collected: 09/17/24 2341    Lab Status: Final result Specimen: Stool from Per Rectum Updated: 09/18/24 0758    Narrative:      The following orders were created for panel order Clostridioides difficile Toxin - Stool, Per Rectum.  Procedure                               Abnormality         Status                     ---------                               -----------         ------                     Clostridioides difficile...[[id number]]  Normal              Final result                 Please view results for these tests on the individual orders.    Clostridioides difficile Toxin, PCR - Stool, Per Rectum [[id number]]  (Normal) Collected: 09/17/24 2341    Lab Status: Final result Specimen: Stool from Per Rectum Updated: 09/18/24 0758     Toxigenic C. difficile by PCR Not Detected    Narrative:      The result indicates the absence of toxigenic C. difficile from stool specimen.     Blood Culture - Blood, Hand, Left [[id number]]  (Abnormal)  (Susceptibility) Collected: 09/17/24 2140    Lab Status: Final result Specimen: Blood from Hand, Left Updated: 09/23/24 0640     Blood Culture Staphylococcus aureus, MRSA     Comment: Infectious disease consultation is highly recommended to rule out distant foci of infection.  Methicillin resistant Staphylococcus aureus, Patient may be an isolation risk.          Isolated from Anaerobic Bottle     Gram Stain Anaerobic Bottle Gram positive cocci in pairs and clusters    Narrative:      Less than seven (7) mL's of blood  was collected.  Insufficient quantity may yield false negative results.    Susceptibility        Staphylococcus aureus, MRSA      ELO      Gentamicin Susceptible      Oxacillin Resistant      Rifampin Susceptible      Vancomycin Susceptible                           Blood Culture ID, PCR - Blood, Hand, Left [[id number]]  (Abnormal) Collected: 09/17/24 2140    Lab Status: Final result Specimen: Blood from Hand, Left Updated: 09/19/24 0815     BCID, PCR Staph aureus. mecA/C and MREJ (methicillin resistance gene) detected. Identification by BCID2 PCR.     BOTTLE TYPE Anaerobic Bottle    Narrative:      Infectious disease consultation is highly recommended to rule out distant foci of infection.    Blood Culture - Blood, Arm, Left [[id number]]  (Normal) Collected: 09/17/24 2130    Lab Status: Final result Specimen: Blood from Arm, Left Updated: 09/23/24 0030     Blood Culture No growth at 5 days    Narrative:      Less than seven (7) mL's of blood was collected.  Insufficient quantity may yield false negative results.    COVID PRE-OP / PRE-PROCEDURE SCREENING ORDER (NO ISOLATION) - Swab, Nasopharynx [[id number]]  (Normal) Collected: 09/17/24 2126    Lab Status: Final result Specimen: Swab from Nasopharynx Updated: 09/17/24 2236    Narrative:      The following orders were created for panel order COVID PRE-OP / PRE-PROCEDURE SCREENING ORDER (NO ISOLATION) - Swab, Nasopharynx.  Procedure                               Abnormality         Status                     ---------                               -----------         ------                     Respiratory Panel PCR w/...[[id number]]  Normal              Final result                 Please view results for these tests on the individual orders.    Respiratory Panel PCR w/COVID-19(SARS-CoV-2) BRIGID/ONI/LICO/PAD/COR/ADA In-House, NP Swab in UTM/VTM, 2 HR TAT - Swab, Nasopharynx [[id number]]  (Normal) Collected: 09/17/24 2126    Lab Status: Final result Specimen: Swab from  Nasopharynx Updated: 09/17/24 2236     ADENOVIRUS, PCR Not Detected     Coronavirus 229E Not Detected     Coronavirus HKU1 Not Detected     Coronavirus NL63 Not Detected     Coronavirus OC43 Not Detected     COVID19 Not Detected     Human Metapneumovirus Not Detected     Human Rhinovirus/Enterovirus Not Detected     Influenza A PCR Not Detected     Influenza B PCR Not Detected     Parainfluenza Virus 1 Not Detected     Parainfluenza Virus 2 Not Detected     Parainfluenza Virus 3 Not Detected     Parainfluenza Virus 4 Not Detected     RSV, PCR Not Detected     Bordetella pertussis pcr Not Detected     Bordetella parapertussis PCR Not Detected     Chlamydophila pneumoniae PCR Not Detected     Mycoplasma pneumo by PCR Not Detected    Narrative:      In the setting of a positive respiratory panel with a viral infection PLUS a negative procalcitonin without other underlying concern for bacterial infection, consider observing off antibiotics or discontinuation of antibiotics and continue supportive care. If the respiratory panel is positive for atypical bacterial infection (Bordetella pertussis, Chlamydophila pneumoniae, or Mycoplasma pneumoniae), consider antibiotic de-escalation to target atypical bacterial infection.                  Radiology:  Imaging Results (Last 72 Hours)       ** No results found for the last 72 hours. **              Impression:   MRSA bacteremia  Norovirus gastroenteritis  Fever  Lactic acidosis concerning for tissue perfusion mismatch  Left upper arm probable thrombophlebitis and probable source of MRSA bacteremia.     PLAN/RECOMMENDATIONS:   Thank you for asking us to see Orestes Garcia Jr., I recommend the following:    Based on physical exam and radiology studies I do not see a source of MRSA bacteremia.    Unclear significance of positive blood cultures certainly MRSA in the blood needs to be treated as if it is real.    Patient clearly had nausea vomiting and diarrhea in the norovirus  makes sense to explain the gastro enteritis presentation    Sometimes if patients are mid to the hospital they can have peripheral IVs infiltrated could have a skin soft tissue phlebitis which can lead to bacteremia.    Cover for MRSA with vancomycin per pharmacy dosing    And for area under the cover [phone]    Place picc line    Cont iv vancomycin x 2-3 weeks    Patient to go to SNF at [facility]  Mariano Mancuso MD  9/24/2024  15:41 EDT

## 2024-09-24 NOTE — CASE MANAGEMENT/SOCIAL WORK
Continued Stay Note  [facility]     Patient Name: Orestes Garcia Jr.  MRN: [id number]  Today's Date: 9/24/2024    Admit Date: 9/17/2024    Plan: [facility]   Discharge Plan       Row Name 09/24/24 1148       Plan    Plan [facility]    Plan Comments Patient's plan is a skilled bed at [facility] when medically ready for discharge.  Discussed patient in MDR.  Awaiting final recommendations from ID.  Updated Anny with Signature.  Patient's insurance precert is good for him to admit through through tomorrow, 9/25.  CM will continue to follow.    Final Discharge Disposition Code 03 - skilled nursing facility (SNF)                   Discharge Codes    No documentation.                 Expected Discharge Date and Time       Expected Discharge Date Expected Discharge Time    Sep 25, 2024               Fatuma Smith RN

## 2024-09-25 VITALS
WEIGHT: 193.5 LBS | DIASTOLIC BLOOD PRESSURE: 72 MMHG | SYSTOLIC BLOOD PRESSURE: 146 MMHG | RESPIRATION RATE: 18 BRPM | HEART RATE: 55 BPM | OXYGEN SATURATION: 96 % | BODY MASS INDEX: 27.7 KG/M2 | HEIGHT: 70 IN | TEMPERATURE: 98.1 F

## 2024-09-25 LAB
GLUCOSE BLDC GLUCOMTR-MCNC: 122 MG/DL (ref 70–130)
GLUCOSE BLDC GLUCOMTR-MCNC: 168 MG/DL (ref 70–130)

## 2024-09-25 PROCEDURE — 99239 HOSP IP/OBS DSCHRG MGMT >30: CPT | Performed by: NURSE PRACTITIONER

## 2024-09-25 PROCEDURE — 82948 REAGENT STRIP/BLOOD GLUCOSE: CPT

## 2024-09-25 PROCEDURE — 25010000002 VANCOMYCIN HCL IN NACL 1.5-0.9 GM/500ML-% SOLUTION

## 2024-09-25 PROCEDURE — C1894 INTRO/SHEATH, NON-LASER: HCPCS

## 2024-09-25 PROCEDURE — 63710000001 INSULIN LISPRO (HUMAN) PER 5 UNITS: Performed by: NURSE PRACTITIONER

## 2024-09-25 PROCEDURE — C1751 CATH, INF, PER/CENT/MIDLINE: HCPCS

## 2024-09-25 RX ORDER — SODIUM CHLORIDE 0.9 % (FLUSH) 0.9 %
10 SYRINGE (ML) INJECTION AS NEEDED
Status: DISCONTINUED | OUTPATIENT
Start: 2024-09-25 | End: 2024-09-25 | Stop reason: HOSPADM

## 2024-09-25 RX ORDER — CITALOPRAM HYDROBROMIDE 40 MG/1
20 TABLET ORAL DAILY
Start: 2024-09-25

## 2024-09-25 RX ORDER — LISINOPRIL 10 MG/1
10 TABLET ORAL DAILY
Start: 2024-09-26

## 2024-09-25 RX ORDER — SODIUM CHLORIDE 0.9 % (FLUSH) 0.9 %
10 SYRINGE (ML) INJECTION EVERY 12 HOURS SCHEDULED
Status: DISCONTINUED | OUTPATIENT
Start: 2024-09-25 | End: 2024-09-25 | Stop reason: HOSPADM

## 2024-09-25 RX ORDER — SODIUM CHLORIDE 9 MG/ML
40 INJECTION, SOLUTION INTRAVENOUS AS NEEDED
Status: DISCONTINUED | OUTPATIENT
Start: 2024-09-25 | End: 2024-09-25 | Stop reason: HOSPADM

## 2024-09-25 RX ORDER — AMLODIPINE BESYLATE 5 MG/1
5 TABLET ORAL
Start: 2024-09-26

## 2024-09-25 RX ORDER — VANCOMYCIN/0.9 % SOD CHLORIDE 1.5G/250ML
1500 PLASTIC BAG, INJECTION (ML) INTRAVENOUS EVERY 24 HOURS
Start: 2024-09-26 | End: 2024-10-10

## 2024-09-25 RX ORDER — SODIUM CHLORIDE 0.9 % (FLUSH) 0.9 %
20 SYRINGE (ML) INJECTION AS NEEDED
Status: DISCONTINUED | OUTPATIENT
Start: 2024-09-25 | End: 2024-09-25 | Stop reason: HOSPADM

## 2024-09-25 RX ADMIN — ASPIRIN 81 MG: 81 TABLET, COATED ORAL at 08:28

## 2024-09-25 RX ADMIN — LISINOPRIL 10 MG: 10 TABLET ORAL at 08:27

## 2024-09-25 RX ADMIN — CLOPIDOGREL BISULFATE 75 MG: 75 TABLET ORAL at 08:27

## 2024-09-25 RX ADMIN — AMLODIPINE BESYLATE 5 MG: 5 TABLET ORAL at 08:28

## 2024-09-25 RX ADMIN — BUSPIRONE HYDROCHLORIDE 5 MG: 10 TABLET ORAL at 08:27

## 2024-09-25 RX ADMIN — INSULIN LISPRO 2 UNITS: 100 INJECTION, SOLUTION INTRAVENOUS; SUBCUTANEOUS at 13:07

## 2024-09-25 RX ADMIN — FAMOTIDINE 20 MG: 20 TABLET, FILM COATED ORAL at 08:27

## 2024-09-25 RX ADMIN — CITALOPRAM HYDROBROMIDE 20 MG: 20 TABLET ORAL at 08:27

## 2024-09-25 RX ADMIN — Medication 1500 MG: at 10:58

## 2024-09-25 RX ADMIN — SENNOSIDES AND DOCUSATE SODIUM 2 TABLET: 50; 8.6 TABLET ORAL at 08:28

## 2024-09-25 NOTE — DISCHARGE SUMMARY
[facility] Medicine Services  DISCHARGE SUMMARY    Patient Name: Orestes Garcia Jr.  : 1940  MRN: [id number]    Date of Admission: 2024  8:42 PM  Date of Discharge:  2024  Primary Care Physician: Jose Alberto Vang MD    Consults       Date and Time Order Name Status Description    2024  9:40 AM Inpatient Infectious Diseases Consult Completed             Hospital Course       Active Hospital Problems    Diagnosis  POA   • **Weakness [R53.1]  Yes   • Norovirus [A08.11]  Yes   • MRSA bacteremia [R78.81, B95.62]  Yes   • Bacteremia [R78.81]  Yes   • Dementia without behavioral disturbance [F03.90]  Yes   • Hyperlipidemia LDL goal <70 [E78.5]  Yes   • Carotid artery stenosis, asymptomatic, bilateral [I65.23]  Yes   • Type 2 diabetes mellitus with stage 3a chronic kidney disease, without long-term current use of insulin [E11.22, N18.31]  Yes   • Benign essential hypertension [I10]  Yes      Resolved Hospital Problems   No resolved problems to display.          Hospital Course:  Orestes Garcia Jr. is a 84 y.o. male    history of type 2 diabetes, CKD stage III hypertension, hyperlipidemia, dementia carotid stenosis, recent admission at OSH for acute pancreatitis for 2 days, discharged home .  He presented to [facility] with generalized weakness  s/p fall, found to have norovirus and MRSA bacteremia     Norovirus infection  MRSA bacteremia, undefined etiology  Left Upper Extremity Thrombophlebitis   -CT abd/pelvis is unremarkable, respiratory PCR with COVID is negative, C. difficile toxin was negative, GI PCR panel is + for Norovirus  -Blood cultures, positive for MRSA,  ID consulted/ following  -Continue vancomycin, sensitivities reveal resistance to oxacillin   -Repeat blood cultures to ensure clearance, NGTD  -Discharge to rehab today with PICC and [facility] will [facility] at [facility]      Well-controlled type 2 diabetes A1c 7.5%  -Continue  SSI     Hypertension  -BP uncontrolled, increased lisinopril to 10mg   -Added and continuing amlodipine   --BP improved      Bilateral carotid artery disease  Hyperlipidemia  -Continue statin, aspirin and Plavix  -Follows with Dr. Wilkerson     Dementia  -Continue Aricept     Generalized weakness  Fall  -Suspect overall deconditioning from recent hospitalization  -PT/OT following, recommend rehab, awaiting insurance approval        Discharge Follow Up Recommendations for outpatient labs/diagnostics:   PCP 1 week after rehab  [facility] will follow at rehab     Day of Discharge     HPI:   Pleasantly confused to details, NAD. No pain. PICC in place. No soa or cough. No fj/c, n/v/d, soa or cp       Vital Signs:   Temp:  [98.1 °F (36.7 °C)-98.4 °F (36.9 °C)] 98.1 °F (36.7 °C)  Heart Rate:  [51-63] 55  Resp:  [17-18] 18  BP: (146-172)/(70-90) 146/72      Physical Exam:  Constitutional: No acute distress, awake, alert  HENT: NCAT, mucous membranes moist  Respiratory: Clear to auscultation bilaterally, respiratory effort normal   Cardiovascular: RRR, no murmurs, rubs, or gallops  Gastrointestinal: Positive bowel sounds, soft, nontender, nondistended  Musculoskeletal: No bilateral ankle edema, left arm/ hand mild edema   Psychiatric: Appropriate affect, cooperative  Neurologic: Oriented x 3 with confusion to details, strength symmetric in all extremities, Cranial Nerves grossly intact to confrontation, speech clear  Skin: no rashes       Pertinent  and/or Most Recent Results     LAB RESULTS:      Lab 09/23/24  1350   WBC 8.26   HEMOGLOBIN 14.1   HEMATOCRIT 42.6   PLATELETS 213   NEUTROS ABS 6.05   IMMATURE GRANS (ABS) 0.04   LYMPHS ABS 1.13   MONOS ABS 0.73   EOS ABS 0.25   MCV 89.9         Lab 09/24/24  0551 09/23/24  1351 09/21/24  0810 09/19/24  0629   SODIUM 138 141 139 139   POTASSIUM 3.6 4.0 3.6 3.8   CHLORIDE 106 105 104 106   CO2 23.0 24.0 25.0 23.0   ANION GAP 9.0 12.0 10.0 10.0   BUN 13 12 13 14   CREATININE 1.29* 1.21  1.19 1.20   EGFR 54.0* 58.3* 59.5* 58.9*   GLUCOSE 142* 120* 118* 81   CALCIUM 8.7 9.0 8.8 8.1*                         Brief Urine Lab Results  (Last result in the past 365 days)        Color   Clarity   Blood   Leuk Est   Nitrite   Protein   CREAT   Urine HCG        09/17/24 2132 Yellow   Cloudy   Trace   Negative   Negative   30 mg/dL (1+)                 Microbiology Results (last 10 days)       Procedure Component Value - Date/Time    Blood Culture - Blood, Arm, Left [[id number]]  (Normal) Collected: 09/23/24 1351    Lab Status: Preliminary result Specimen: Blood from Arm, Left Updated: 09/24/24 1515     Blood Culture No growth at 24 hours    Gastrointestinal Panel, PCR - Stool, Per Rectum [[id number]]  (Abnormal) Collected: 09/17/24 2341    Lab Status: Final result Specimen: Stool from Per Rectum Updated: 09/18/24 0844     Campylobacter Not Detected     Plesiomonas shigelloides Not Detected     Salmonella Not Detected     Vibrio Not Detected     Vibrio cholerae Not Detected     Yersinia enterocolitica Not Detected     Enteroaggregative E. coli (EAEC) Not Detected     Enteropathogenic E. coli (EPEC) Not Detected     Enterotoxigenic E. coli (ETEC) lt/st Not Detected     Shiga-like toxin-producing E. coli (STEC) stx1/stx2 Not Detected     Shigella/Enteroinvasive E. coli (EIEC) Not Detected     Cryptosporidium Not Detected     Cyclospora cayetanensis Not Detected     Entamoeba histolytica Not Detected     Giardia lamblia Not Detected     Adenovirus F40/41 Not Detected     Astrovirus Not Detected     Norovirus GI/GII Detected     Comment: If a positive Norovirus result is inconsistent with clinical presentation, the positive Norovirus result should be confirmed using another method.        Rotavirus A Not Detected     Sapovirus (I, II, IV or V) Not Detected    Clostridioides difficile Toxin - Stool, Per Rectum [[id number]]  (Normal) Collected: 09/17/24 2341    Lab Status: Final result Specimen: Stool from Per Rectum  Updated: 09/18/24 0758    Narrative:      The following orders were created for panel order Clostridioides difficile Toxin - Stool, Per Rectum.  Procedure                               Abnormality         Status                     ---------                               -----------         ------                     Clostridioides difficile...[[id number]]  Normal              Final result                 Please view results for these tests on the individual orders.    Clostridioides difficile Toxin, PCR - Stool, Per Rectum [[id number]]  (Normal) Collected: 09/17/24 2341    Lab Status: Final result Specimen: Stool from Per Rectum Updated: 09/18/24 0758     Toxigenic C. difficile by PCR Not Detected    Narrative:      The result indicates the absence of toxigenic C. difficile from stool specimen.     Blood Culture - Blood, Hand, Left [[id number]]  (Abnormal)  (Susceptibility) Collected: 09/17/24 2140    Lab Status: Final result Specimen: Blood from Hand, Left Updated: 09/23/24 0640     Blood Culture Staphylococcus aureus, MRSA     Comment: Infectious disease consultation is highly recommended to rule out distant foci of infection.  Methicillin resistant Staphylococcus aureus, Patient may be an isolation risk.          Isolated from Anaerobic Bottle     Gram Stain Anaerobic Bottle Gram positive cocci in pairs and clusters    Narrative:      Less than seven (7) mL's of blood was collected.  Insufficient quantity may yield false negative results.    Susceptibility        Staphylococcus aureus, MRSA      ELO      Gentamicin Susceptible      Oxacillin Resistant      Rifampin Susceptible      Vancomycin Susceptible                           Blood Culture ID, PCR - Blood, Hand, Left [[id number]]  (Abnormal) Collected: 09/17/24 2140    Lab Status: Final result Specimen: Blood from Hand, Left Updated: 09/19/24 0815     BCID, PCR Staph aureus. mecA/C and MREJ (methicillin resistance gene) detected. Identification by BCID2 PCR.      BOTTLE TYPE Anaerobic Bottle    Narrative:      Infectious disease consultation is highly recommended to rule out distant foci of infection.    Blood Culture - Blood, Arm, Left [[id number]]  (Normal) Collected: 09/17/24 2130    Lab Status: Final result Specimen: Blood from Arm, Left Updated: 09/23/24 0030     Blood Culture No growth at 5 days    Narrative:      Less than seven (7) mL's of blood was collected.  Insufficient quantity may yield false negative results.    COVID PRE-OP / PRE-PROCEDURE SCREENING ORDER (NO ISOLATION) - Swab, Nasopharynx [[id number]]  (Normal) Collected: 09/17/24 2126    Lab Status: Final result Specimen: Swab from Nasopharynx Updated: 09/17/24 2236    Narrative:      The following orders were created for panel order COVID PRE-OP / PRE-PROCEDURE SCREENING ORDER (NO ISOLATION) - Swab, Nasopharynx.  Procedure                               Abnormality         Status                     ---------                               -----------         ------                     Respiratory Panel PCR w/...[[id number]]  Normal              Final result                 Please view results for these tests on the individual orders.    Respiratory Panel PCR w/COVID-19(SARS-CoV-2) BRIGID/ONI/LICO/PAD/COR/ADA In-House, NP Swab in UTM/VTM, 2 HR TAT - Swab, Nasopharynx [[id number]]  (Normal) Collected: 09/17/24 2126    Lab Status: Final result Specimen: Swab from Nasopharynx Updated: 09/17/24 2236     ADENOVIRUS, PCR Not Detected     Coronavirus 229E Not Detected     Coronavirus HKU1 Not Detected     Coronavirus NL63 Not Detected     Coronavirus OC43 Not Detected     COVID19 Not Detected     Human Metapneumovirus Not Detected     Human Rhinovirus/Enterovirus Not Detected     Influenza A PCR Not Detected     Influenza B PCR Not Detected     Parainfluenza Virus 1 Not Detected     Parainfluenza Virus 2 Not Detected     Parainfluenza Virus 3 Not Detected     Parainfluenza Virus 4 Not Detected     RSV, PCR Not Detected      Bordetella pertussis pcr Not Detected     Bordetella parapertussis PCR Not Detected     Chlamydophila pneumoniae PCR Not Detected     Mycoplasma pneumo by PCR Not Detected    Narrative:      In the setting of a positive respiratory panel with a viral infection PLUS a negative procalcitonin without other underlying concern for bacterial infection, consider observing off antibiotics or discontinuation of antibiotics and continue supportive care. If the respiratory panel is positive for atypical bacterial infection (Bordetella pertussis, Chlamydophila pneumoniae, or Mycoplasma pneumoniae), consider antibiotic de-escalation to target atypical bacterial infection.            CT Abdomen Pelvis Without Contrast    Result Date: 9/17/2024  CT ABDOMEN PELVIS WO CONTRAST Date of Exam: 9/17/2024 10:21 PM EDT Indication: abdominal pain/fever. Comparison: None available. Technique: Axial CT images were obtained of the abdomen and pelvis without the administration of contrast. Reconstructed coronal and sagittal images were also obtained. Automated exposure control and iterative construction methods were used. FINDINGS: Lung bases: Calcified granulomata. Liver:No masses. No intrahepatic biliary ductal dilatation. Spleen: Calcified granulomata. Pancreas:No pancreatic masses. No evidence of pancreatitis. Gallbladder and common bile duct:No evidence of cholelithiasis. No evidence of cholecystitis. Adrenal glands:No adrenal masses Kidneys and ureters: Multiple bilateral renal cysts. No calculi present within the ureters. Normal caliber ureters. Urinary bladder:No urinary bladder wall thickening. No bladder masses. Small bowel:Normal caliber small bowel. Large bowel: Extensive colonic diverticulosis. Appendix: Normal GENITOURINARY: Normal prostate Ascites or pneumoperitoneum:None. Adenopathy:None present Osseous structures: The proximal femurs are intact. The pubic bones are intact. Lumbar vertebral body height and alignment are normal.  No lytic or blastic disease. Other findings: None     No acute abdominal or pelvic pathology. Electronically Signed: Karsten Anderson MD  9/17/2024 10:43 PM EDT  Workstation ID: [id number]    CT Head Without Contrast    Result Date: 9/17/2024  CT HEAD WO CONTRAST Date of Exam: 9/17/2024 10:21 PM EDT Indication: CONFUSION. Comparison: None available. Technique: Axial CT images were obtained of the head without contrast administration.  Automated exposure control and iterative construction methods were used. Findings: No intracranial hemorrhage. Gray-white matter differentiation is maintained without evidence of an acute infarction. Multiple foci of decreased attenuation are present within the subcortical, deep cerebral, and periventricular white matter consistent with chronic small vessel/microangiopathic ischemic changes. No extra-axial mass or collection. The ventricles and sulci are prominent commensurate with involutional changes. The posterior fossa appears grossly normal. Sellar and suprasellar structures are normal. Bilateral lens replacements. The paranasal sinuses, ethmoid air cells, and mastoid air cells are aerated. The bony calvarium is intact.     Impression: No acute intracranial pathology. Electronically Signed: Karsten Anderson MD  9/17/2024 10:30 PM EDT  Workstation ID: [id number]    XR Chest 1 View    Result Date: 9/17/2024  XR CHEST 1 VW Date of Exam: 9/17/2024 9:45 PM EDT Indication: abdominal pain/fever Comparison: None available. Findings: Low lung volumes. Enlarged cardiac silhouette. No focal airspace consolidation. Possible trace bilateral pleural effusions. No pneumothorax. No acute osseous abnormality.     Impression: Enlarged cardiac silhouette with possible trace bilateral pleural effusions. Hypoventilatory changes without definite airspace opacity. Electronically Signed: Vishal Valadez MD  9/17/2024 10:04 PM EDT  Workstation ID: [id number]     Results for orders placed in visit on 03/01/22    Duplex  Carotid Ultrasound CAR    Interpretation Summary  · Mild plaques no significant obstruction, bilateral  · Normal antegrade vertebral flow bilateral      Results for orders placed in visit on 03/01/22    Duplex Carotid Ultrasound CAR    Interpretation Summary  · Mild plaques no significant obstruction, bilateral  · Normal antegrade vertebral flow bilateral      Results for orders placed in visit on 07/20/22    Adult Transthoracic Echo Complete W/ Cont if Necessary Per Protocol    Interpretation Summary  · Estimated left ventricular EF = 55% Estimated left ventricular EF was in disagreement with the calculated left ventricular EF. Left ventricular ejection fraction appears to be 56 - 60%. Left ventricular systolic function is normal.  · Left ventricular diastolic function is consistent with (grade II w/high LAP) pseudonormalization.  · Left ventricular wall thickness is consistent with mild concentric hypertrophy.  · Estimated right ventricular systolic pressure from tricuspid regurgitation is normal (<35 mmHg).  · Mild aortic sclerosis without obstruction  · The RV has normal size and function  · Trvial TR  · No pericardial effusion      Plan for Follow-up of Pending Labs/Results: [facility]   Pending Labs       Order Current Status    Blood Culture - Blood, Arm, Left Preliminary result          Discharge Details        Discharge Medications        New Medications        Instructions Start Date   amLODIPine 5 MG tablet  Commonly known as: NORVASC   5 mg, Oral, Every 24 Hours Scheduled   Start Date: September 26, 2024     Vancomycin HCl in NaCl 1.5-0.9 GM/500ML-% solution IVPB   1,500 mg, Intravenous, Every 24 Hours   Start Date: September 26, 2024            Changes to Medications        Instructions Start Date   busPIRone 5 MG tablet  Commonly known as: BUSPAR  What changed: when to take this   5 mg, Oral, 2 Times Daily, FOR ANXIETY      citalopram 40 MG tablet  Commonly known as: CeleXA  What changed: how much to take    20 mg, Oral, Daily      lisinopril 10 MG tablet  Commonly known as: PRINIVIL,ZESTRIL  What changed:   medication strength  how much to take   10 mg, Oral, Daily   Start Date: September 26, 2024            Continue These Medications        Instructions Start Date   aspirin 81 MG EC tablet   81 mg, Oral, Daily, occasionally      atorvastatin 20 MG tablet  Commonly known as: LIPITOR   20 mg, Oral, Nightly      clopidogrel 75 MG tablet  Commonly known as: PLAVIX   75 mg, Oral, Daily      donepezil 10 MG tablet  Commonly known as: ARICEPT   10 mg, Oral, Every Evening      traZODone 50 MG tablet  Commonly known as: DESYREL   Take 1-2 pills po 30-60 min before bedtime to help with nerves and sleep      Vitamin D3 25 MCG (1000 UT) capsule   1,000 Units, Oral, Daily             Stop These Medications      glucose blood test strip     metFORMIN  MG 24 hr tablet  Commonly known as: GLUCOPHAGE-XR              No Known Allergies      Discharge Disposition:  Rehab Facility or Unit (DC - External)    Diet:  Hospital:  Diet Order   Procedures   • Diet: Diabetic; Consistent Carbohydrate; Fluid Consistency: Thin (IDDSI 0)               CODE STATUS:    Code Status and Medical Interventions: CPR (Attempt to Resuscitate); Full Support   Ordered at: 09/18/24 0105     Level Of Support Discussed With:    Next of Kin (If No Surrogate)     Code Status (Patient has no pulse and is not breathing):    CPR (Attempt to Resuscitate)     Medical Interventions (Patient has pulse or is breathing):    Full Support       Future Appointments   Date Time Provider Department Center   11/4/2024  1:30 PM Greg Wilkerson MD [facility]  09/25/24      Time Spent on Discharge:  I spent  40  minutes on this discharge activity which included: face-to-face encounter with the patient, reviewing the data in the system, coordination of the care with the nursing staff as well as consultants, documentation, and  entering orders.

## 2024-09-25 NOTE — PROGRESS NOTES
"          Clinical Nutrition Assessment     Patient Name: Orestes Garcia Jr.  YOB: 1940  MRN: [id number]  Date of Encounter: 09/25/24 12:33 EDT  Admission date: 9/17/2024  Reason for Visit: Follow-up protocol     Assessment   Nutrition Assessment   Admission Diagnosis:  Weakness [R53.1]  Bacteremia [R78.81]    Problem List:    Weakness    Carotid artery stenosis, asymptomatic, bilateral    Hyperlipidemia LDL goal <70    Type 2 diabetes mellitus with stage 3a chronic kidney disease, without long-term current use of insulin    Benign essential hypertension    Dementia without behavioral disturbance    Norovirus    MRSA bacteremia    Bacteremia      PMH:   He  has a past medical history of Anemia, Benign essential HTN, Carotid artery stenosis (07/2020), CKD (chronic kidney disease), stage III, Colon polyp (2012), Condition not found (2018), Dementia, Depression, Diverticular disease of colon, Diverticulosis, Hearing loss, High risk medication use, IBS (irritable bowel syndrome), Mixed hyperlipidemia, Overweight (BMI 25.0-29.9), Skin cancer, Transient cerebral ischemia (2019), and Type 2 diabetes mellitus.    PSH:  He  has a past surgical history that includes Skin biopsy (Right, 2016); Colonoscopy (2012); Tonsillectomy (1950); Cataract extraction (2020); and Interventional radiology procedure (Bilateral, 8/31/2022).    Applicable Nutrition History:     Anthropometrics     Height: Height: 177.8 cm (70\")  Last Filed Weight: Weight: 87.8 kg (193 lb 8 oz) (09/22/24 2300)  Method: Weight Method: Bed scale  BMI: BMI (Calculated): 27.8    UBW: Per EMR wt of 191 lbs on 2/16/24    Weight change: No significant changes noted at this time   Measured wt ordered.    Nutrition Focused Physical Exam    Date: 9/22    Unable to perform due to Defer pending indication     Subjective   Reported/Observed/Food/Nutrition Related History:   09/25/24   Patient endorsed a good appetite and PO intake. Denied any n/v. Declined any " "new nutrition needs or concerns at this time.    09/23/24   Patient reported tolerating current CLD. Stated he isn't currently hungry. Willing to advance diet, stated \"will do whatever you all think I need to do\". Denied any GI issues, no documented n/v/d. Unclear why patient has been on CLD for so long. Spoke with today's provider who is agreeable with advancing PO diet as well. Declined any chewing or swallowing difficulties. NKFA.    9/22  Pt allows at usual wt and was eating  ok PTA.    Current Nutrition Prescription   PO: Diet: Diabetic; Consistent Carbohydrate; Fluid Consistency: Thin (IDDSI 0)  Oral Nutrition Supplement: n/a  Intake: 9/24 B 100%; 9/25 B 100% PO intake documented     Assessment & Plan   Nutrition Diagnosis   Date:  9/22            Updated:  09/23/24  Problem Inadequate oral intake    Etiology Recent N/V   Signs/Symptoms NPO/Clr Liq diet > 120 hjr   Status: Discontinued - PO diet adv    Goal / Objectives:   Maintain nutrition and Maintain intake    Nutrition Intervention      Care plan reviewed    No further nutrition monitoring warranted at this time. RDN will follow peripherally. Available via consult.    Monitoring/Evaluation:   Per protocol    Elma aCrr, MS,RD,LD  Time Spent: 25min  "

## 2024-09-25 NOTE — CASE MANAGEMENT/SOCIAL WORK
Case Management Discharge Note      Final Note: Patient's plan is a skilled bed at [facility] today, 9/25.  Family to transport.  Nurse to call report to [phone].  Facility will get discharge summary out of EPIC.         Selected Continued Care - Admitted Since 9/17/2024       Destination Coordination complete.      Service Provider Selected Services Address Phone Fax Patient Preferred    [facility] AT [address] [address] [phone] --              Durable Medical Equipment    No services have been selected for the patient.                Dialysis/Infusion    No services have been selected for the patient.                Home Medical Care    No services have been selected for the patient.                Therapy    No services have been selected for the patient.                Community Resources    No services have been selected for the patient.                Community & DME    No services have been selected for the patient.                         Final Discharge Disposition Code: 03 - skilled nursing facility (SNF)

## 2024-09-28 LAB — BACTERIA SPEC AEROBE CULT: NORMAL

## 2024-10-17 DIAGNOSIS — I65.23 CAROTID ARTERY STENOSIS, ASYMPTOMATIC, BILATERAL: ICD-10-CM

## 2024-10-17 DIAGNOSIS — E78.5 HYPERLIPIDEMIA LDL GOAL <70: ICD-10-CM

## 2024-10-17 RX ORDER — ATORVASTATIN CALCIUM 20 MG/1
20 TABLET, FILM COATED ORAL NIGHTLY
Qty: 90 TABLET | Refills: 3 | OUTPATIENT
Start: 2024-10-17

## 2024-10-17 RX ORDER — CITALOPRAM HYDROBROMIDE 40 MG/1
20 TABLET ORAL DAILY
Start: 2024-10-17

## 2024-10-17 NOTE — TELEPHONE ENCOUNTER
Caller: Kim Garcia    Relationship: Emergency Contact    Best call back number: [phone]     Requested Prescriptions:   Requested Prescriptions     Pending Prescriptions Disp Refills    citalopram (CeleXA) 40 MG tablet       Sig: Take 0.5 tablets by mouth Daily.    atorvastatin (LIPITOR) 20 MG tablet 90 tablet 3     Sig: Take 1 tablet by mouth Every Night.        Pharmacy where request should be sent: [address] [phone] [facility] [phone] FX     Last office visit with prescribing clinician: 4/1/2024   Last telemedicine visit with prescribing clinician: Visit date not found   Next office visit with prescribing clinician: Visit date not found     Additional details provided by patient: PT IS OUT OF MEDICATION.    Does the patient have less than a 3 day supply:  [x] Yes  [] No    Would you like a call back once the refill request has been completed: [] Yes [x] No    If the office needs to give you a call back, can they leave a voicemail: [] Yes [x] No    René Gonzalez Rep   10/17/24 09:09 EDT

## 2024-10-19 ENCOUNTER — TELEPHONE (OUTPATIENT)
Dept: FAMILY MEDICINE CLINIC | Facility: CLINIC | Age: 84
End: 2024-10-19
Payer: MEDICARE

## 2024-10-21 LAB
QT INTERVAL: 510 MS
QTC INTERVAL: 510 MS

## 2024-10-21 NOTE — TELEPHONE ENCOUNTER
Katja leal to reach needs appt with Dr. Vang   Left message with Frank Rosario in case they can speak to him before sending in refills

## 2024-10-24 ENCOUNTER — OFFICE VISIT (OUTPATIENT)
Dept: FAMILY MEDICINE CLINIC | Facility: CLINIC | Age: 84
End: 2024-10-24
Payer: MEDICARE

## 2024-10-24 VITALS
HEART RATE: 71 BPM | SYSTOLIC BLOOD PRESSURE: 136 MMHG | WEIGHT: 190.5 LBS | BODY MASS INDEX: 27.27 KG/M2 | HEIGHT: 70 IN | DIASTOLIC BLOOD PRESSURE: 82 MMHG | OXYGEN SATURATION: 95 %

## 2024-10-24 DIAGNOSIS — F03.90 DEMENTIA WITHOUT BEHAVIORAL DISTURBANCE: ICD-10-CM

## 2024-10-24 DIAGNOSIS — F51.05 INSOMNIA DUE TO ANXIETY AND FEAR: ICD-10-CM

## 2024-10-24 DIAGNOSIS — Z79.899 ENCOUNTER FOR LONG-TERM (CURRENT) USE OF HIGH-RISK MEDICATION: ICD-10-CM

## 2024-10-24 DIAGNOSIS — I65.23 CAROTID ARTERY STENOSIS, ASYMPTOMATIC, BILATERAL: ICD-10-CM

## 2024-10-24 DIAGNOSIS — E11.22 TYPE 2 DIABETES MELLITUS WITH STAGE 3A CHRONIC KIDNEY DISEASE, WITHOUT LONG-TERM CURRENT USE OF INSULIN: Primary | ICD-10-CM

## 2024-10-24 DIAGNOSIS — N18.31 TYPE 2 DIABETES MELLITUS WITH STAGE 3A CHRONIC KIDNEY DISEASE, WITHOUT LONG-TERM CURRENT USE OF INSULIN: Primary | ICD-10-CM

## 2024-10-24 DIAGNOSIS — F32.4 MAJOR DEPRESSIVE DISORDER WITH SINGLE EPISODE, IN PARTIAL REMISSION: ICD-10-CM

## 2024-10-24 DIAGNOSIS — F40.9 INSOMNIA DUE TO ANXIETY AND FEAR: ICD-10-CM

## 2024-10-24 DIAGNOSIS — E78.5 HYPERLIPIDEMIA LDL GOAL <70: ICD-10-CM

## 2024-10-24 DIAGNOSIS — I10 BENIGN ESSENTIAL HYPERTENSION: ICD-10-CM

## 2024-10-24 DIAGNOSIS — F41.1 GAD (GENERALIZED ANXIETY DISORDER): ICD-10-CM

## 2024-10-24 PROCEDURE — 99214 OFFICE O/P EST MOD 30 MIN: CPT | Performed by: FAMILY MEDICINE

## 2024-10-24 PROCEDURE — 1126F AMNT PAIN NOTED NONE PRSNT: CPT | Performed by: FAMILY MEDICINE

## 2024-10-24 RX ORDER — BUSPIRONE HYDROCHLORIDE 5 MG/1
5 TABLET ORAL 2 TIMES DAILY
Qty: 180 TABLET | Refills: 3 | Status: SHIPPED | OUTPATIENT
Start: 2024-10-24

## 2024-10-24 RX ORDER — DONEPEZIL HYDROCHLORIDE 10 MG/1
10 TABLET, FILM COATED ORAL EVERY EVENING
Qty: 90 TABLET | Refills: 3 | Status: SHIPPED | OUTPATIENT
Start: 2024-10-24

## 2024-10-24 RX ORDER — CITALOPRAM HYDROBROMIDE 40 MG/1
20 TABLET ORAL DAILY
Qty: 45 TABLET | Refills: 3
Start: 2024-10-24

## 2024-10-24 RX ORDER — TRAZODONE HYDROCHLORIDE 50 MG/1
TABLET, FILM COATED ORAL
Qty: 90 TABLET | Refills: 3 | Status: SHIPPED | OUTPATIENT
Start: 2024-10-24

## 2024-10-24 RX ORDER — CLOPIDOGREL BISULFATE 75 MG/1
75 TABLET ORAL DAILY
Qty: 90 TABLET | Refills: 3 | Status: SHIPPED | OUTPATIENT
Start: 2024-10-24

## 2024-10-24 RX ORDER — ATORVASTATIN CALCIUM 20 MG/1
20 TABLET, FILM COATED ORAL NIGHTLY
Qty: 90 TABLET | Refills: 3 | Status: SHIPPED | OUTPATIENT
Start: 2024-10-24

## 2024-10-24 RX ORDER — DOXYCYCLINE 100 MG/1
100 CAPSULE ORAL 2 TIMES DAILY
COMMUNITY
Start: 2024-10-11

## 2024-10-24 RX ORDER — LISINOPRIL 10 MG/1
10 TABLET ORAL DAILY
Qty: 90 TABLET | Refills: 3
Start: 2024-10-24

## 2024-10-24 NOTE — PROGRESS NOTES
"Chief Complaint  Diabetes and Hospital Follow Up Visit ([facility] )    Subjective      Orestes Garcia Jr. presents to [facility]  History of Present Illness  Patient is here for 6-month checkup on diabetes and other chronic issues.  The patient was admitted to [facility] for 2 days about a month and a half ago for presumed pancreatitis.  He was sent home mild still very weak and unsteady, and that his mind prematurely, so he was taken by family to [facility], where he was readmitted for a week for gastroenteritis due to norovirus, and subsequently had sepsis due to infected IV site in the left arm from Staph aureus.  He was then transferred to [facility] where he did physical therapy for a while before returning home.  He says he is walking 3 times a day, but has not been doing the additional exercises that physical therapy recommended.  Nonetheless he is much improved.  His appetite is good, bowel movements are normal.  Dr. Mancuso with [place] [facility] is monitoring 6 weeks of IV antibiotics for the patient which will go on for an additional 10 to 12 days, and he is following the patient regularly.  The patient has had extensive laboratory work including a hemoglobin A1c 1 month ago that was 7.5.  Objective   Vital Signs:   Vitals:    10/24/24 1522   BP: 136/82   BP Location: Left arm   Patient Position: Sitting   Cuff Size: Adult   Pulse: 71   SpO2: 95%   Weight: 86.4 kg (190 lb 8 oz)   Height: 177.8 cm (70\")      /82 (BP Location: Left arm, Patient Position: Sitting, Cuff Size: Adult)   Pulse 71   Ht 177.8 cm (70\")   Wt 86.4 kg (190 lb 8 oz)   SpO2 95%   BMI 27.33 kg/m²     Body mass index is 27.33 kg/m².    Review of Systems   Constitutional:  Negative for activity change, appetite change, chills and fever.   HENT:  Negative for congestion, nosebleeds, sore throat, swollen glands, trouble " swallowing and voice change.    Eyes:  Negative for blurred vision, double vision, redness and visual disturbance.   Respiratory:  Negative for cough, choking, chest tightness and shortness of breath.    Cardiovascular:  Negative for chest pain, palpitations and leg swelling.   Gastrointestinal:  Negative for abdominal pain, blood in stool, constipation, diarrhea, nausea, vomiting and GERD.   Endocrine: Negative for polydipsia, polyphagia and polyuria.   Genitourinary:  Negative for dysuria and hematuria.   Musculoskeletal:  Positive for arthralgias (Some chronic pain in the left knee) and joint swelling (Occasionally left knee, which is chronic and stable). Negative for back pain, gait problem, myalgias and neck pain.   Skin:  Negative for rash and skin lesions.   Neurological:  Positive for memory problem. Negative for dizziness, seizures, syncope, facial asymmetry, speech difficulty, weakness, light-headedness and headache.   Hematological:  Negative for adenopathy.   Psychiatric/Behavioral:  Negative for sleep disturbance and depressed mood. The patient is not nervous/anxious.        Past History:  Medical History: has a past medical history of Anemia, Benign essential HTN, Carotid artery stenosis (07/2020), CKD (chronic kidney disease), stage III, Colon polyp (2012), Condition not found (2018), Dementia, Depression, Diverticular disease of colon, Diverticulosis, Gastroenteritis due to norovirus (2024), Hearing loss, High risk medication use, IBS (irritable bowel syndrome), Mixed hyperlipidemia, Overweight (BMI 25.0-29.9), Pancreatitis, acute (2024), Sepsis (2024), Skin cancer, Transient cerebral ischemia (2019), and Type 2 diabetes mellitus.   Surgical History: has a past surgical history that includes Skin biopsy (Right, 2016); Colonoscopy (2012); Tonsillectomy (1950); Cataract extraction (2020); and Interventional radiology procedure (Bilateral, 8/31/2022).   Family History: family history includes  Alzheimer's disease in his father; Cancer in his mother; Coronary artery disease (age of onset: 86) in his father; Diabetes in his father; Hearing loss in his father; Hypertension in his mother; Pancreatic cancer in his mother.   Social History: reports that he has never smoked. He has never used smokeless tobacco. He reports that he does not drink alcohol and does not use drugs.      Current Outpatient Medications:     aspirin 81 MG EC tablet, Take 1 tablet by mouth Daily. occasionally, Disp: , Rfl:     atorvastatin (LIPITOR) 20 MG tablet, Take 1 tablet by mouth Every Night., Disp: 90 tablet, Rfl: 3    busPIRone (BUSPAR) 5 MG tablet, Take 1 tablet by mouth 2 (Two) Times a Day. FOR ANXIETY, Disp: 180 tablet, Rfl: 3    Cholecalciferol (Vitamin D3) 25 MCG (1000 UT) capsule, Take 1 capsule by mouth Daily., Disp: , Rfl:     citalopram (CeleXA) 40 MG tablet, Take 0.5 tablets by mouth Daily., Disp: 45 tablet, Rfl: 3    clopidogrel (PLAVIX) 75 MG tablet, Take 1 tablet by mouth Daily., Disp: 90 tablet, Rfl: 3    donepezil (ARICEPT) 10 MG tablet, Take 1 tablet by mouth Every Evening., Disp: 90 tablet, Rfl: 3    doxycycline (MONODOX) 100 MG capsule, Take 1 capsule by mouth 2 (Two) Times a Day., Disp: , Rfl:     lisinopril (PRINIVIL,ZESTRIL) 10 MG tablet, Take 1 tablet by mouth Daily., Disp: 90 tablet, Rfl: 3    traZODone (DESYREL) 50 MG tablet, Take 1-2 pills po 30-60 min before bedtime to help with nerves and sleep, Disp: 90 tablet, Rfl: 3    amLODIPine (NORVASC) 5 MG tablet, Take 1 tablet by mouth Daily. (Patient not taking: Reported on 10/24/2024), Disp: , Rfl:     Allergies: Patient has no known allergies.    Physical Exam  Constitutional:       General: He is not in acute distress.     Appearance: Normal appearance. He is not ill-appearing, toxic-appearing or diaphoretic.   HENT:      Head: Normocephalic and atraumatic.      Right Ear: Ear canal and external ear normal.      Left Ear: Ear canal and external ear normal.       Nose: Nose normal.      Mouth/Throat:      Mouth: Mucous membranes are moist.      Pharynx: Oropharynx is clear.   Eyes:      General: No scleral icterus.     Extraocular Movements: Extraocular movements intact.      Conjunctiva/sclera: Conjunctivae normal.      Pupils: Pupils are equal, round, and reactive to light.   Neck:      Vascular: No carotid bruit.   Cardiovascular:      Rate and Rhythm: Normal rate and regular rhythm.      Pulses: Normal pulses.      Heart sounds: Normal heart sounds.   Pulmonary:      Effort: Pulmonary effort is normal.      Breath sounds: Normal breath sounds. No wheezing, rhonchi or rales.   Chest:      Chest wall: No tenderness.   Abdominal:      General: Bowel sounds are normal. There is no distension.      Palpations: Abdomen is soft. There is no mass.      Tenderness: There is no abdominal tenderness. There is no guarding or rebound.   Musculoskeletal:         General: Deformity (Mild OA findings in left knee) present. No swelling. Normal range of motion.      Cervical back: Normal range of motion. No rigidity.      Right lower leg: No edema.      Left lower leg: No edema.   Lymphadenopathy:      Cervical: No cervical adenopathy.   Skin:     General: Skin is warm and dry.      Capillary Refill: Capillary refill takes less than 2 seconds.      Coloration: Skin is not jaundiced.      Findings: No bruising, erythema or rash.   Neurological:      General: No focal deficit present.      Mental Status: He is alert. Mental status is at baseline.      Cranial Nerves: No cranial nerve deficit, dysarthria or facial asymmetry.      Motor: No weakness, tremor or atrophy.      Coordination: Coordination normal.      Gait: Gait normal.   Psychiatric:         Attention and Perception: Attention and perception normal.         Mood and Affect: Mood and affect normal.         Speech: Speech normal.         Behavior: Behavior normal.         Thought Content: Thought content is not paranoid or  delusional. Thought content does not include homicidal or suicidal ideation.         Cognition and Memory: Cognition normal. He exhibits impaired recent memory.         Judgment: Judgment is impulsive.                   Assessment and Plan   Diagnoses and all orders for this visit:    1. Type 2 diabetes mellitus with stage 3a chronic kidney disease, without long-term current use of insulin (Primary)  Patient's diabetes continues to be adequately controlled without additional medications, with hemoglobin A1c of 7.51-month ago.  He is not due for any additional lab work at this time.  2. Encounter for long-term (current) use of high-risk medication    3. Benign essential hypertension  The patient's amlodipine was being held when he was discharged from [facility], although the discharge instructions said that he was to restart it approximately September 26, 2024.  However, we do not have the records from [facility], and he and his wife are both uncertain as to whether he is taking this medication now.  I wrote down instructions for them to continue taking it if he has been taking it, but if not, and he will wait until he sees his cardiologist in about 10 days and go from there.  4. Hyperlipidemia LDL goal <70  -     atorvastatin (LIPITOR) 20 MG tablet; Take 1 tablet by mouth Every Night.  Dispense: 90 tablet; Refill: 3    5. Major depressive disorder with single episode, in partial remission  Well-controlled with Celexa 20 mg daily (half of a 40 mg pill) that he will continue  6. DEEP (generalized anxiety disorder)  Adequately controlled with Celexa 20 mg daily, buspirone 5 mg twice a day, and trazodone 50 mg, 1-2 at bedtime  7. Insomnia due to anxiety and fear  See above, trazodone is helping and he tolerates it well  8. Dementia without behavioral disturbance  The patient continues to take donepezil 10 mg daily  9. Carotid artery stenosis, asymptomatic, bilateral  -     atorvastatin (LIPITOR) 20 MG  tablet; Take 1 tablet by mouth Every Night.  Dispense: 90 tablet; Refill: 3  This is followed by cardiology  Other orders  -     citalopram (CeleXA) 40 MG tablet; Take 0.5 tablets by mouth Daily.  Dispense: 45 tablet; Refill: 3  -     clopidogrel (PLAVIX) 75 MG tablet; Take 1 tablet by mouth Daily.  Dispense: 90 tablet; Refill: 3  -     donepezil (ARICEPT) 10 MG tablet; Take 1 tablet by mouth Every Evening.  Dispense: 90 tablet; Refill: 3  -     traZODone (DESYREL) 50 MG tablet; Take 1-2 pills po 30-60 min before bedtime to help with nerves and sleep  Dispense: 90 tablet; Refill: 3  -     busPIRone (BUSPAR) 5 MG tablet; Take 1 tablet by mouth 2 (Two) Times a Day. FOR ANXIETY  Dispense: 180 tablet; Refill: 3  -     lisinopril (PRINIVIL,ZESTRIL) 10 MG tablet; Take 1 tablet by mouth Daily.  Dispense: 90 tablet; Refill: 3            Follow Up   No follow-ups on file.  Patient was given instructions and counseling regarding his condition or for health maintenance advice. Please see specific information pulled into the AVS if appropriate.     Jose Alberto Vang MD

## 2024-10-25 RX ORDER — METFORMIN HCL 500 MG
500 TABLET, EXTENDED RELEASE 24 HR ORAL DAILY
Qty: 90 TABLET | Refills: 3 | Status: SHIPPED | OUTPATIENT
Start: 2024-10-25

## 2024-11-04 ENCOUNTER — OFFICE VISIT (OUTPATIENT)
Dept: CARDIOLOGY | Facility: CLINIC | Age: 84
End: 2024-11-04
Payer: MEDICARE

## 2024-11-04 VITALS
TEMPERATURE: 98 F | SYSTOLIC BLOOD PRESSURE: 122 MMHG | HEART RATE: 70 BPM | HEIGHT: 70 IN | OXYGEN SATURATION: 98 % | RESPIRATION RATE: 16 BRPM | WEIGHT: 192 LBS | DIASTOLIC BLOOD PRESSURE: 60 MMHG | BODY MASS INDEX: 27.49 KG/M2

## 2024-11-04 DIAGNOSIS — E11.22 TYPE 2 DIABETES MELLITUS WITH STAGE 3A CHRONIC KIDNEY DISEASE, WITHOUT LONG-TERM CURRENT USE OF INSULIN: ICD-10-CM

## 2024-11-04 DIAGNOSIS — E78.5 HYPERLIPIDEMIA LDL GOAL <70: ICD-10-CM

## 2024-11-04 DIAGNOSIS — I65.23 CAROTID ARTERY STENOSIS, ASYMPTOMATIC, BILATERAL: ICD-10-CM

## 2024-11-04 DIAGNOSIS — I10 BENIGN ESSENTIAL HYPERTENSION: Primary | ICD-10-CM

## 2024-11-04 DIAGNOSIS — N18.31 TYPE 2 DIABETES MELLITUS WITH STAGE 3A CHRONIC KIDNEY DISEASE, WITHOUT LONG-TERM CURRENT USE OF INSULIN: ICD-10-CM

## 2024-11-04 PROCEDURE — 1160F RVW MEDS BY RX/DR IN RCRD: CPT | Performed by: INTERNAL MEDICINE

## 2024-11-04 PROCEDURE — 1159F MED LIST DOCD IN RCRD: CPT | Performed by: INTERNAL MEDICINE

## 2024-11-04 PROCEDURE — 99214 OFFICE O/P EST MOD 30 MIN: CPT | Performed by: INTERNAL MEDICINE

## 2024-11-04 NOTE — PROGRESS NOTES
[facility]  [facility]  [address] [place] [address]  Dept: [phone]  Dept Fax: [phone]    Orestes Garcia Jr.  1940    Follow Up Office Visit Note    History of Present Illness:  Orestes Garcia Jr. is a 84 y.o. male who presents to the clinic for Follow-up. Carotid disease= Mild disease less than 50%  on Asa and Plavix, has also right vertebral 100% stenosis    The following portions of the patient's history were reviewed and updated as appropriate: allergies, current medications, past family history, past medical history, past social history, past surgical history, and problem list.    Medications:  aspirin  atorvastatin  busPIRone  citalopram  clopidogrel  donepezil  doxycycline  lisinopril  metFORMIN ER  traZODone  Vitamin D3 capsule    Subjective  No Known Allergies     Past Medical History:   Diagnosis Date    Anemia     Benign essential HTN     Carotid artery stenosis 07/2020    RIGHT CAROTID  40-60% STENOSIS ON DOPPLER RECORD SAYS H/O TIA BUT PT DENIES THIS??    CKD (chronic kidney disease), stage III     Colon polyp 2012    LAST C QUESTIONABLE    Condition not found 2018    COLOGUARD NEG 9-18    Dementia     Depression     Diverticular disease of colon     WITHOUT HEMORRHAGE    Diverticulosis     WITH LOWR GI BLEED APPROS AGE 49    Gastroenteritis due to norovirus 2024    Admitted to [facility] for 1 week, therapy at [facility] before returning home    Hearing loss     High risk medication use     IBS (irritable bowel syndrome)     WITH DIARRHEA    Mixed hyperlipidemia     Overweight (BMI 25.0-29.9)     Pancreatitis, acute 2024    possible, had acute Norovirus infection, no other cause identified, in [facility] 2[facility], pancreatic enzymes OK when admitted to [facility]    Sepsis 2024    staph, in IV site of left arm initially, finishing 6 weeks of IV Atbx per Infectious Dis.    Skin cancer     LEG    Transient cerebral ischemia 2019    POSSIBLE A/W FALL    Type 2  "diabetes mellitus     WITH STAGE 3 CKD WITHOUT LONG TERM CURRENT USE OF INSULIN   WITH POTEINURIA       Past Surgical History:   Procedure Laterality Date    CATARACT EXTRACTION  2020    COLONOSCOPY  2012    [facility]    INTERVENTIONAL RADIOLOGY PROCEDURE Bilateral 8/31/2022    Procedure: Carotid Cerebral Angiogram;  Surgeon: Stan Falk MD;  Location: [facility] INVASIVE LOCATION;  Service: Interventional Radiology;  Laterality: Bilateral;    SKIN BIOPSY Right 2016    LEG    TONSILLECTOMY  1950       Family History   Problem Relation Age of Onset    Cancer Mother     Hypertension Mother     Pancreatic cancer Mother     Alzheimer's disease Father     Coronary artery disease Father 86    Diabetes Father     Hearing loss Father         Social History     Socioeconomic History    Marital status:    Tobacco Use    Smoking status: Never    Smokeless tobacco: Never   Vaping Use    Vaping status: Never Used   Substance and Sexual Activity    Alcohol use: Never    Drug use: Never    Sexual activity: Defer       Review of Systems   Constitutional: Negative.    HENT: Negative.     Respiratory: Negative.     Cardiovascular: Negative.    Endocrine: Negative.    Genitourinary: Negative.    Musculoskeletal: Negative.    Skin: Negative.    Allergic/Immunologic: Negative.    Neurological: Negative.    Hematological: Negative.    Psychiatric/Behavioral: Negative.     All other systems reviewed and are negative.    Cardiovascular Procedures    ECHO/MUGA:  STRESS TESTS:   CARDIAC CATH:   DEVICES:   HOLTER:   CT/MRI:   VASCULAR:   CARDIOTHORACIC:     Objective  Vitals:    11/04/24 1328   BP: 122/60   BP Location: Right arm   Patient Position: Lying   Cuff Size: Adult   Pulse: 70   Resp: 16   Temp: 98 °F (36.7 °C)   TempSrc: Infrared   SpO2: 98%   Weight: 87.1 kg (192 lb)   Height: 177.8 cm (70\")   PainSc: 0-No pain     Body mass index is 27.55 kg/m².     Physical Exam  Vitals reviewed.   Constitutional:       Appearance: Healthy " appearance. Not in distress.   Eyes:      Pupils: Pupils are equal, round, and reactive to light.   HENT:    Mouth/Throat:      Pharynx: Oropharynx is clear.   Neck:      Thyroid: Thyroid normal.      Vascular: No JVR. JVD normal.   Pulmonary:      Effort: Pulmonary effort is normal.      Breath sounds: Normal breath sounds. No wheezing. No rhonchi. No rales.   Chest:      Chest wall: Not tender to palpatation.   Cardiovascular:      PMI at left midclavicular line. Normal rate. Regular rhythm. Normal S1. Normal S2.       Murmurs: There is no murmur.      No gallop.  No click. No rub.   Pulses:     Intact distal pulses.      Carotid: 3+ bilaterally.     Radial: 3+ bilaterally.     Femoral: 3+ bilaterally.     Dorsalis pedis: 3+ bilaterally.     Posterior tibial: 3+ bilaterally.  Edema:     Peripheral edema absent.   Abdominal:      General: Bowel sounds are normal.      Palpations: Abdomen is soft.      Tenderness: There is no abdominal tenderness.   Musculoskeletal: Normal range of motion.         General: No tenderness.      Cervical back: Normal range of motion and neck supple. Skin:     General: Skin is warm and dry.   Neurological:      General: No focal deficit present.      Mental Status: Alert and oriented to person, place and time.        Diagnostic Data  Procedures    Assessment and Plan  Diagnoses and all orders for this visit:    Benign essential hypertension- BP is good, 125.70 on Lisinopril 10 mg    Carotid artery stenosis, asymptomatic, bilateral- mild disease by angiogram    Hyperlipidemia LDL goal <70- On Lipitor 20 mg LDL is 59    Type 2 diabetes mellitus with stage 3a chronic kidney disease, without long-term current use of insulin         Return in about 6 months (around 5/4/2025) for Recheck with Dr. Wilkerson.    Greg Wilkerson MD  11/04/2024

## 2024-11-05 ENCOUNTER — OUTSIDE FACILITY SERVICE (OUTPATIENT)
Dept: FAMILY MEDICINE CLINIC | Facility: CLINIC | Age: 84
End: 2024-11-05
Payer: MEDICARE

## 2024-11-05 PROCEDURE — G0179 MD RECERTIFICATION HHA PT: HCPCS | Performed by: FAMILY MEDICINE

## 2024-11-18 DIAGNOSIS — E78.5 HYPERLIPIDEMIA LDL GOAL <70: ICD-10-CM

## 2024-11-18 DIAGNOSIS — I65.23 CAROTID ARTERY STENOSIS, ASYMPTOMATIC, BILATERAL: ICD-10-CM

## 2024-11-18 RX ORDER — ATORVASTATIN CALCIUM 20 MG/1
20 TABLET, FILM COATED ORAL NIGHTLY
Qty: 90 TABLET | Refills: 3 | OUTPATIENT
Start: 2024-11-18

## 2024-11-18 RX ORDER — LISINOPRIL 10 MG/1
10 TABLET ORAL DAILY
Start: 2024-11-18

## 2024-11-18 NOTE — TELEPHONE ENCOUNTER
Caller: Kim Garcia    Relationship: Emergency Contact    Best call back number: [phone]    Requested Prescriptions:   Requested Prescriptions     Pending Prescriptions Disp Refills    atorvastatin (LIPITOR) 20 MG tablet 90 tablet 3     Sig: Take 1 tablet by mouth Every Night.    lisinopril (PRINIVIL,ZESTRIL) 10 MG tablet       Sig: Take 1 tablet by mouth Daily.        Pharmacy where request should be sent:    [facility] [phone]  Last office visit with prescribing clinician: 10/24/2024   Last telemedicine visit with prescribing clinician: Visit date not found   Next office visit with prescribing clinician: Visit date not found     Additional details provided by patient: PATIENT IS OUT OF MEDICATIONS    Does the patient have less than a 3 day supply:  [x] Yes  [] No    Would you like a call back once the refill request has been completed: [] Yes [x] No    If the office needs to give you a call back, can they leave a voicemail: [] Yes [x] No    René Mckeon Rep   11/18/24 10:44 EST

## 2024-11-19 RX ORDER — LISINOPRIL 10 MG/1
10 TABLET ORAL DAILY
Qty: 90 TABLET | Refills: 3 | Status: SHIPPED | OUTPATIENT
Start: 2024-11-19

## 2024-11-19 RX ORDER — LISINOPRIL 10 MG/1
10 TABLET ORAL DAILY
Qty: 30 TABLET | Refills: 0 | OUTPATIENT
Start: 2024-11-19

## 2024-12-03 ENCOUNTER — TELEPHONE (OUTPATIENT)
Dept: FAMILY MEDICINE CLINIC | Facility: CLINIC | Age: 84
End: 2024-12-03
Payer: MEDICARE

## 2024-12-03 NOTE — TELEPHONE ENCOUNTER
Dania with [facility] called to report an FYI. The patient fell on Saturday night and was able to get back up with the help of EMS. There was no injury and vitals are in normal ranges today. Call back if needed is [phone]

## 2024-12-10 RX ORDER — METFORMIN HYDROCHLORIDE 500 MG/1
500 TABLET, EXTENDED RELEASE ORAL DAILY
Qty: 90 TABLET | Refills: 2 | OUTPATIENT
Start: 2024-12-10

## 2024-12-12 ENCOUNTER — OFFICE VISIT (OUTPATIENT)
Dept: FAMILY MEDICINE CLINIC | Facility: CLINIC | Age: 84
End: 2024-12-12
Payer: MEDICARE

## 2024-12-12 ENCOUNTER — TELEPHONE (OUTPATIENT)
Dept: FAMILY MEDICINE CLINIC | Facility: CLINIC | Age: 84
End: 2024-12-12

## 2024-12-12 VITALS
DIASTOLIC BLOOD PRESSURE: 76 MMHG | TEMPERATURE: 98.9 F | BODY MASS INDEX: 27.76 KG/M2 | OXYGEN SATURATION: 96 % | HEIGHT: 70 IN | SYSTOLIC BLOOD PRESSURE: 116 MMHG | WEIGHT: 193.9 LBS | HEART RATE: 90 BPM

## 2024-12-12 DIAGNOSIS — F03.90 DEMENTIA WITHOUT BEHAVIORAL DISTURBANCE: ICD-10-CM

## 2024-12-12 DIAGNOSIS — F41.1 GAD (GENERALIZED ANXIETY DISORDER): ICD-10-CM

## 2024-12-12 DIAGNOSIS — N39.0 ACUTE UTI: Primary | ICD-10-CM

## 2024-12-12 DIAGNOSIS — R35.0 FREQUENCY OF URINATION: ICD-10-CM

## 2024-12-12 DIAGNOSIS — F32.4 MAJOR DEPRESSIVE DISORDER WITH SINGLE EPISODE, IN PARTIAL REMISSION: ICD-10-CM

## 2024-12-12 LAB
BILIRUB BLD-MCNC: NEGATIVE MG/DL
CLARITY, POC: ABNORMAL
COLOR UR: ABNORMAL
EXPIRATION DATE: ABNORMAL
GLUCOSE UR STRIP-MCNC: ABNORMAL MG/DL
KETONES UR QL: NEGATIVE
LEUKOCYTE EST, POC: ABNORMAL
Lab: ABNORMAL
NITRITE UR-MCNC: POSITIVE MG/ML
PH UR: 7 [PH] (ref 5–8)
PROT UR STRIP-MCNC: ABNORMAL MG/DL
RBC # UR STRIP: ABNORMAL /UL
SP GR UR: 1.03 (ref 1–1.03)
UROBILINOGEN UR QL: ABNORMAL

## 2024-12-12 PROCEDURE — 99214 OFFICE O/P EST MOD 30 MIN: CPT | Performed by: FAMILY MEDICINE

## 2024-12-12 PROCEDURE — 81003 URINALYSIS AUTO W/O SCOPE: CPT | Performed by: FAMILY MEDICINE

## 2024-12-12 PROCEDURE — 1159F MED LIST DOCD IN RCRD: CPT | Performed by: FAMILY MEDICINE

## 2024-12-12 PROCEDURE — 1160F RVW MEDS BY RX/DR IN RCRD: CPT | Performed by: FAMILY MEDICINE

## 2024-12-12 PROCEDURE — 1126F AMNT PAIN NOTED NONE PRSNT: CPT | Performed by: FAMILY MEDICINE

## 2024-12-12 RX ORDER — CEFDINIR 300 MG/1
300 CAPSULE ORAL 2 TIMES DAILY
Qty: 14 CAPSULE | Refills: 0 | Status: SHIPPED | OUTPATIENT
Start: 2024-12-12

## 2024-12-12 RX ORDER — BUSPIRONE HYDROCHLORIDE 10 MG/1
10 TABLET ORAL 3 TIMES DAILY
Qty: 270 TABLET | Refills: 3 | Status: SHIPPED | OUTPATIENT
Start: 2024-12-12

## 2024-12-12 NOTE — PROGRESS NOTES
"Chief Complaint  Urinary Frequency    Subjective      Orestes Garcia Jr. presents to [facility]  History of Present Illness  Patient says he had to urinate a lot more frequently lately, and that has been going on \"for a while\", but it timeframe is not clearly described.  The patient has dementia and his wife really cannot help with the history.  He denies any hematuria or dysuria, and says he feels like he is emptying his bladder completely.  Denies any back pain flank pain fever chills nausea vomiting or abdominal pain.    The patient also has a long history of anxiety, and has been on Celexa 40 mg daily along with BuSpar 5 mg twice a day, but he feels like his anxiety is no longer well-controlled.  He denies depression issues currently, but says he just feels nervous and restless throughout the day.  No thoughts of harming self or others.    The patient's wife says that the patient needs to see some type of specialist because they were advised at his last hospital stay that \"it just did not look right.\"  I asked her what reportedly did not look right but she could not remember, and was very unclear about this, admitting that she had forgotten.  We discussed the different types of specialist that he might be referred to depending on what was reportedly abnormal, but she could never recall what type of specialist he was supposed to see.  I told her that I would review his hospital records and get back in touch with her.  We did discuss the possibility of the patient being referred to a urologist since he appears to have a UTI today, has had often indicates a problem with bladder emptying, possibly related to enlarged prostate but possibly related to impairment of bladder function.  However, his wife repeatedly insisted that he did not need to see a urologist, as she says that his records were faxed to her urologist and they assured her there was no indication for " "consultation.  Objective   Vital Signs:   Vitals:    12/12/24 1102   BP: 116/76   BP Location: Left arm   Patient Position: Sitting   Cuff Size: Adult   Pulse: 90   Temp: 98.9 °F (37.2 °C)   TempSrc: Oral   SpO2: 96%   Weight: 88 kg (193 lb 14.4 oz)   Height: 177.8 cm (70\")      /76 (BP Location: Left arm, Patient Position: Sitting, Cuff Size: Adult)   Pulse 90   Temp 98.9 °F (37.2 °C) (Oral)   Ht 177.8 cm (70\")   Wt 88 kg (193 lb 14.4 oz)   SpO2 96%   BMI 27.82 kg/m²     Body mass index is 27.82 kg/m².    Review of Systems   Constitutional:  Negative for chills and fever.   HENT:  Negative for sore throat and trouble swallowing.    Respiratory:  Negative for cough and shortness of breath.    Cardiovascular:  Negative for chest pain and palpitations.   Gastrointestinal:  Negative for abdominal pain, blood in stool, constipation, diarrhea, nausea and vomiting.   Genitourinary:  Positive for frequency and urgency. Negative for decreased urine volume, difficulty urinating, dysuria, flank pain, hematuria, penile pain and testicular pain.   Musculoskeletal:  Negative for back pain, joint swelling and myalgias.   Skin:  Negative for rash.   Neurological:  Positive for tremors and memory problem. Negative for dizziness, seizures, syncope, speech difficulty, weakness, light-headedness and headache.   Hematological:  Negative for adenopathy.   Psychiatric/Behavioral:  Negative for depressed mood. The patient is nervous/anxious.        Past History:  Medical History: has a past medical history of Anemia, Benign essential HTN, Carotid artery stenosis (07/2020), CKD (chronic kidney disease), stage III, Colon polyp (2012), Condition not found (2018), Dementia, Depression, Diverticular disease of colon, Diverticulosis, Gastroenteritis due to norovirus (2024), Hearing loss, High risk medication use, IBS (irritable bowel syndrome), Mixed hyperlipidemia, Overweight (BMI 25.0-29.9), Pancreatitis, acute (2024), Sepsis " (2024), Skin cancer, Transient cerebral ischemia (2019), and Type 2 diabetes mellitus.   Surgical History: has a past surgical history that includes Skin biopsy (Right, 2016); Colonoscopy (2012); Tonsillectomy (1950); Cataract extraction (2020); and Interventional radiology procedure (Bilateral, 8/31/2022).   Family History: family history includes Alzheimer's disease in his father; Cancer in his mother; Coronary artery disease (age of onset: 86) in his father; Diabetes in his father; Hearing loss in his father; Hypertension in his mother; Pancreatic cancer in his mother.   Social History: reports that he has never smoked. He has never used smokeless tobacco. He reports that he does not drink alcohol and does not use drugs.      Current Outpatient Medications:     aspirin 81 MG EC tablet, Take 1 tablet by mouth Daily. occasionally, Disp: , Rfl:     atorvastatin (LIPITOR) 20 MG tablet, Take 1 tablet by mouth Every Night., Disp: 90 tablet, Rfl: 3    Cholecalciferol (Vitamin D3) 25 MCG (1000 UT) capsule, Take 1 capsule by mouth Daily., Disp: , Rfl:     citalopram (CeleXA) 40 MG tablet, Take 0.5 tablets by mouth Daily., Disp: 45 tablet, Rfl: 3    clopidogrel (PLAVIX) 75 MG tablet, Take 1 tablet by mouth Daily., Disp: 90 tablet, Rfl: 3    donepezil (ARICEPT) 10 MG tablet, Take 1 tablet by mouth Every Evening., Disp: 90 tablet, Rfl: 3    lisinopril (PRINIVIL,ZESTRIL) 10 MG tablet, Take 1 tablet by mouth Daily., Disp: 90 tablet, Rfl: 3    metFORMIN ER (GLUCOPHAGE-XR) 500 MG 24 hr tablet, TAKE 1 TABLET BY MOUTH EVERY DAY, Disp: 90 tablet, Rfl: 3    traZODone (DESYREL) 50 MG tablet, Take 1-2 pills po 30-60 min before bedtime to help with nerves and sleep, Disp: 90 tablet, Rfl: 3    busPIRone (BUSPAR) 10 MG tablet, Take 1 tablet by mouth 3 (Three) Times a Day. Take every day, to help control anxiety, Disp: 270 tablet, Rfl: 3    cefdinir (OMNICEF) 300 MG capsule, Take 1 capsule by mouth 2 (Two) Times a Day., Disp: 14 capsule,  Rfl: 0    Allergies: Patient has no known allergies.    Physical Exam  Constitutional:       General: He is not in acute distress.     Appearance: Normal appearance. He is not toxic-appearing.   HENT:      Head: Normocephalic and atraumatic.      Right Ear: Ear canal and external ear normal.      Left Ear: Ear canal and external ear normal.      Nose: Nose normal.      Mouth/Throat:      Mouth: Mucous membranes are moist.      Pharynx: Oropharynx is clear.   Eyes:      General: No scleral icterus.     Extraocular Movements: Extraocular movements intact.      Conjunctiva/sclera: Conjunctivae normal.      Pupils: Pupils are equal, round, and reactive to light.   Neck:      Vascular: No carotid bruit.   Cardiovascular:      Rate and Rhythm: Normal rate and regular rhythm.      Pulses: Normal pulses.      Heart sounds: Normal heart sounds.   Pulmonary:      Effort: Pulmonary effort is normal.      Breath sounds: Normal breath sounds. No wheezing, rhonchi or rales.   Chest:      Chest wall: No tenderness.   Abdominal:      General: Bowel sounds are normal. There is no distension.      Palpations: Abdomen is soft. There is no mass.      Tenderness: There is no abdominal tenderness. There is no right CVA tenderness, left CVA tenderness, guarding or rebound.   Musculoskeletal:         General: No swelling or deformity. Normal range of motion.      Cervical back: Normal range of motion. No rigidity.      Right lower leg: No edema.      Left lower leg: No edema.   Lymphadenopathy:      Cervical: No cervical adenopathy.   Skin:     General: Skin is warm and dry.      Capillary Refill: Capillary refill takes less than 2 seconds.      Coloration: Skin is not pale.      Findings: No erythema or rash.   Neurological:      General: No focal deficit present.      Mental Status: He is alert. Mental status is at baseline.      Cranial Nerves: No cranial nerve deficit, dysarthria or facial asymmetry.      Motor: Tremor present. No  weakness or atrophy.      Coordination: Coordination normal.      Gait: Gait abnormal (Uses a cane for extra balance).      Comments: Intention tremor.  Oriented to person and place only   Psychiatric:         Attention and Perception: Attention and perception normal.         Mood and Affect: Mood is anxious. Mood is not depressed or elated. Affect is not flat, angry or tearful.         Speech: Speech normal.         Behavior: Behavior normal.         Thought Content: Thought content is not paranoid or delusional. Thought content does not include homicidal or suicidal ideation.         Cognition and Memory: Cognition normal. Memory is impaired.         Judgment: Judgment is impulsive.                   Assessment and Plan   Diagnoses and all orders for this visit:    1. Acute UTI (Primary)  -     Urine Culture - Urine, Urine, Random Void  Will send urine for culture and treat empirically with cefdinir twice a day for 7 days.  If symptoms worsen he will go to the emergency room.  If his symptoms do not improve significantly with the antibiotics, and they will let me know, so we can refer him to a urologist.  2. Frequency of urination  -     POC Urinalysis Dipstick, Automated    3. DEEP (generalized anxiety disorder)  Increase BuSpar to 10 mg 3 times a day and continue Celexa 40 mg , half a pill daily  4. Dementia without behavioral disturbance    5. Major depressive disorder with single episode, in partial remission  Well-controlled with Celexa 40 mg, half a pill daily and he will continue  I also took some time to review the records from his last hospital stay including the discharge summary, lab results, and imaging that was done including CT scan of the chest abdomen pelvis, and I could not find any indication that the patient was advised to see a specialist in follow-up for anything.  I will relay this information to his wife.  Other orders  -     cefdinir (OMNICEF) 300 MG capsule; Take 1 capsule by mouth 2 (Two)  Times a Day.  Dispense: 14 capsule; Refill: 0  -     busPIRone (BUSPAR) 10 MG tablet; Take 1 tablet by mouth 3 (Three) Times a Day. Take every day, to help control anxiety  Dispense: 270 tablet; Refill: 3            Follow Up   Return if symptoms worsen or fail to improve.  Patient was given instructions and counseling regarding his condition or for health maintenance advice. Please see specific information pulled into the AVS if appropriate.     Jose Alberto Vang MD

## 2024-12-12 NOTE — TELEPHONE ENCOUNTER
"Please let pt's wife know that I have looked over the hospital records from the most recent visit to hospital, and there is absolutely no mention of anything abnormal that requires a follow up with any type of specialist. I reviewed the doctor's notes, the labs, and the CT scan of the abdomen and pelvis, and there was nothing that, as she put it, \"does not look right\". Thus, I have no idea what she is referring to, and I suspect there was a misunderstanding, but the discharge summary does NOT recommend any referral to any specialist.   "

## 2024-12-16 LAB
BACTERIA UR CULT: ABNORMAL
BACTERIA UR CULT: ABNORMAL
OTHER ANTIBIOTIC SUSC ISLT: ABNORMAL

## 2024-12-16 NOTE — TELEPHONE ENCOUNTER
"Please let pt's wife know that I have looked over the hospital records from the most recent visit to hospital, and there is absolutely no mention of anything abnormal that requires a follow up with any type of specialist. I reviewed the doctor's notes, the labs, and the CT scan of the abdomen and pelvis, and there was nothing that, as she put it, \"does not look right\". Thus, I have no idea what she is referring to, and I suspect there was a misunderstanding, but the discharge summary does NOT recommend any referral to any specialist.          Note          Dr. Vang called Mrs. Garcia she stated that is was Dr. Swan urologist that told her Mr. Garcia had an abnormal test   And he was going to refer him to a specialist states she will call Dr. Swan tomorrow and call us back   "

## 2024-12-20 ENCOUNTER — CLINICAL SUPPORT (OUTPATIENT)
Dept: FAMILY MEDICINE CLINIC | Facility: CLINIC | Age: 84
End: 2024-12-20
Payer: MEDICARE

## 2024-12-20 ENCOUNTER — TELEPHONE (OUTPATIENT)
Dept: FAMILY MEDICINE CLINIC | Facility: CLINIC | Age: 84
End: 2024-12-20

## 2024-12-20 DIAGNOSIS — N39.0 URINARY TRACT INFECTION WITHOUT HEMATURIA, SITE UNSPECIFIED: Primary | ICD-10-CM

## 2024-12-20 DIAGNOSIS — N39.0 RECURRENT UTI: ICD-10-CM

## 2024-12-20 LAB
BILIRUB BLD-MCNC: NEGATIVE MG/DL
CLARITY, POC: CLEAR
COLOR UR: YELLOW
GLUCOSE UR STRIP-MCNC: NEGATIVE MG/DL
KETONES UR QL: NEGATIVE
LEUKOCYTE EST, POC: ABNORMAL
NITRITE UR-MCNC: NEGATIVE MG/ML
PH UR: 7 [PH] (ref 5–8)
PROT UR STRIP-MCNC: ABNORMAL MG/DL
RBC # UR STRIP: NEGATIVE /UL
SP GR UR: 1.02 (ref 1–1.03)
UROBILINOGEN UR QL: ABNORMAL

## 2024-12-20 PROCEDURE — 81002 URINALYSIS NONAUTO W/O SCOPE: CPT | Performed by: FAMILY MEDICINE

## 2024-12-20 NOTE — TELEPHONE ENCOUNTER
Please let pt /wife (he has dementia) know that urine looks fairly normal, definitely much better than it was the last time, but we will send it for a culture and I think they got him appt with Urologist on Monday if I understood correctly. If he gets sick over the weekend, pain gets worse, has a lot of swelling in scrotum/testicle, or fever/vomiting then he should go to ER.

## 2024-12-20 NOTE — TELEPHONE ENCOUNTER
Please let pt /wife (he has dementia) know that urine looks fairly normal, definitely much better than it was the last time, but we will send it for a culture and I think they got him appt with Urologist on Monday if I understood correctly. If he gets sick over the weekend, pain gets worse, has a lot of swelling in scrotum/testicle, or fever/vomiting then he should go to ER.     Pt wife was notfiied 12/20/2024

## 2024-12-20 NOTE — TELEPHONE ENCOUNTER
PTS WIFE DENISE CALLED. NOT ON VERBAL BUT LISTED AS EMERGENCY CONTACT. PT WAS SEEN 12/12 FOR URINARY PROBLEMS. HE HAS TAKEN ALL MEDICATION AND STILL HAS URGENCY AND VERY UNCOMFORTABLE TESTICULAR PAIN. PLEASE ADVISE. [phone]

## 2024-12-22 LAB
BACTERIA UR CULT: NORMAL
BACTERIA UR CULT: NORMAL

## 2024-12-23 RX ORDER — CITALOPRAM HYDROBROMIDE 40 MG/1
40 TABLET ORAL DAILY
Qty: 90 TABLET | Refills: 3 | Status: SHIPPED | OUTPATIENT
Start: 2024-12-23

## 2025-01-09 ENCOUNTER — TELEPHONE (OUTPATIENT)
Dept: FAMILY MEDICINE CLINIC | Facility: CLINIC | Age: 85
End: 2025-01-09
Payer: MEDICARE

## 2025-01-09 DIAGNOSIS — F41.9 ANXIETY WITH AGITATION: Primary | ICD-10-CM

## 2025-01-09 DIAGNOSIS — R45.1 ANXIETY WITH AGITATION: Primary | ICD-10-CM

## 2025-01-09 DIAGNOSIS — F02.B11 MODERATE LATE ONSET ALZHEIMER'S DEMENTIA WITH AGITATION: ICD-10-CM

## 2025-01-09 DIAGNOSIS — G30.1 MODERATE LATE ONSET ALZHEIMER'S DEMENTIA WITH AGITATION: ICD-10-CM

## 2025-01-09 DIAGNOSIS — F41.9 ANXIETY: ICD-10-CM

## 2025-01-09 NOTE — TELEPHONE ENCOUNTER
Pt's wife, Kim, was here today saying that pt's anxiety and agitation keep gradually getting worse, so I strongly recommended referral to Psych for help, and she agreed and asked me to schedule. Pt has dementia.

## 2025-01-10 ENCOUNTER — OUTSIDE FACILITY SERVICE (OUTPATIENT)
Dept: FAMILY MEDICINE CLINIC | Facility: CLINIC | Age: 85
End: 2025-01-10
Payer: MEDICARE

## 2025-02-14 ENCOUNTER — READMISSION MANAGEMENT (OUTPATIENT)
Dept: CALL CENTER | Facility: HOSPITAL | Age: 85
End: 2025-02-14
Payer: MEDICARE

## 2025-02-14 ENCOUNTER — OFFICE VISIT (OUTPATIENT)
Dept: BEHAVIORAL HEALTH | Facility: CLINIC | Age: 85
End: 2025-02-14
Payer: MEDICARE

## 2025-02-14 VITALS
WEIGHT: 183 LBS | OXYGEN SATURATION: 94 % | BODY MASS INDEX: 26.2 KG/M2 | DIASTOLIC BLOOD PRESSURE: 82 MMHG | SYSTOLIC BLOOD PRESSURE: 130 MMHG | HEIGHT: 70 IN | HEART RATE: 63 BPM

## 2025-02-14 DIAGNOSIS — F02.A11 MILD LATE ONSET ALZHEIMER'S DEMENTIA WITH AGITATION: ICD-10-CM

## 2025-02-14 DIAGNOSIS — F41.1 GAD (GENERALIZED ANXIETY DISORDER): Primary | ICD-10-CM

## 2025-02-14 DIAGNOSIS — G30.1 MILD LATE ONSET ALZHEIMER'S DEMENTIA WITH AGITATION: ICD-10-CM

## 2025-02-14 PROBLEM — F02.B11 MODERATE LATE ONSET ALZHEIMER'S DEMENTIA WITH AGITATION: Status: ACTIVE | Noted: 2022-08-23

## 2025-02-14 RX ORDER — DOXYCYCLINE HYCLATE 100 MG/1
100 TABLET, DELAYED RELEASE ORAL DAILY
COMMUNITY

## 2025-02-14 RX ORDER — AMLODIPINE BESYLATE 5 MG/1
5 TABLET ORAL DAILY
COMMUNITY

## 2025-02-14 NOTE — PROGRESS NOTES
New Patient Office Visit      Patient Name: Orestes Garcia Jr.  : 1940   MRN: [id number]     Referring Provider: Jose Alberto Vang MD    Chief Complaint:      ICD-10-CM ICD-9-CM   1. DEEP (generalized anxiety disorder)  F41.1 300.02   2. 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 is a 84 y.o. male who is here today for initial evaluation. Wife is present and provides collateral information with patient permission. Patient referred by PCP due to reported concerns for agitation associated with dementia. Patient endorses anxiety throughout the day. He denies any specific triggers but does say he is easily frustrated, especially related to things he cannot do as independently or as quickly anymore. He does state his anxiety can cause him to get angry and yell at times. He has taken citalopram for mood for many years with success. He has been prescribed buspirone for several years as well but reports he does not take it consistently. He endorses good sleep and appetite. He was hospitalized for an infection last year and has not bounced back as well as he would like. He is not as physically active and has had some falls in the past.    Current Medications:   Buspirone 10 mg TID  Citalopram 40 mg daily  Donepezil 10 mg HS  Trazodone 50 mg HS    Therapy: none    Subjective     Past Medical History:   Past Medical History:   Diagnosis Date    Anemia     Benign essential HTN     Carotid artery stenosis 2020    RIGHT CAROTID  40-60% STENOSIS ON DOPPLER RECORD SAYS H/O TIA BUT PT DENIES THIS??    CKD (chronic kidney disease), stage III     Colon polyp     LAST C QUESTIONABLE    Condition not found 2018    COLOGUARD NEG 9-18    Dementia     Depression     Diverticular disease of colon     WITHOUT HEMORRHAGE    Diverticulosis     WITH LOWR GI BLEED APPROS AGE 49    Gastroenteritis due to norovirus     Admitted to [facility]  for 1 week, therapy at [facility] before returning home    Hearing loss     High risk medication use     IBS (irritable bowel syndrome)     WITH DIARRHEA    Mixed hyperlipidemia     Overweight (BMI 25.0-29.9)     Pancreatitis, acute 2024    possible, had acute Norovirus infection, no other cause identified, in [facility] 2d, pancreatic enzymes OK when admitted to [facility]    Sepsis 2024    staph, in IV site of left arm initially, finishing 6 weeks of IV Atbx per Infectious Dis.    Skin cancer     LEG    Transient cerebral ischemia 2019    POSSIBLE A/W FALL    Type 2 diabetes mellitus     WITH STAGE 3 CKD WITHOUT LONG TERM CURRENT USE OF INSULIN   WITH POTEINURIA       Past Surgical History:   Past Surgical History:   Procedure Laterality Date    CATARACT EXTRACTION  2020    COLONOSCOPY  2012    [facility]    INTERVENTIONAL RADIOLOGY PROCEDURE Bilateral 8/31/2022    Procedure: Carotid Cerebral Angiogram;  Surgeon: Stan Falk MD;  Location: [facility] INVASIVE LOC[facility];  Service: Interventional Radiology;  Laterality: Bilateral;    SKIN BIOPSY Right 2016    LEG    TONSILLECTOMY  1950       Family History:   Family History   Problem Relation Age of Onset    Cancer Mother     Hypertension Mother     Pancreatic cancer Mother     Alzheimer's disease Father     Coronary artery disease Father 86    Diabetes Father     Hearing loss Father        Family Psychiatric History:  Did not assess    Screening Scores:   PHQ-9: PHQ-9 Depression Screening  Little interest or pleasure in doing things? Several days   Feeling down, depressed, or hopeless? Almost all   PHQ-2 Total Score 4   Trouble falling or staying asleep, or sleeping too much? Several days   Feeling tired or having little energy? Over half   Poor appetite or overeating? Several days   Feeling bad about yourself - or that you are a failure or have let yourself or your family down? Over half   Trouble concentrating on things, such as reading the newspaper or watching television?  Over half   Moving or speaking so slowly that other people could have noticed? Or the opposite - being so fidgety or restless that you have been moving around a lot more than usual? Over half   Thoughts that you would be better off dead, or of hurting yourself in some way? Not at all   PHQ-9 Total Score 14   If you checked off any problems, how difficult have these problems made it for you to do your work, take care of things at home, or get along with other people? Somewhat difficult     DEEP 7: DEEP 7 Total Score: 16 Patient screened positive for depression based on a PHQ-9 score of 14 on 2/14/2025. Follow-up recommendations include: Prescribed antidepressant medication treatment.     Psychiatric History:     Previous medications: None   Inpatient admissions: Did not assess  History of suicide/self harm attempts: None disclosed  Family history of suicide or attempts: None disclosed    RISK ASSESSMENT:    Does patient currently have intent, plan, ideation for suicide? Denies  History of homicidal ideation: Denies  Risk Taking/Impulsive Behavior (current or past): None disclosed  Protective factors: Family    Social History:    Highest level of education obtained: college  Living situation: Lives with his wife  Patient's Occupation: Retired-banking  Leisure and Recreation:  Watching TV, he enjoys sports  Support system: Yes, his wife and family-very close with a granddaughter  Illicit substance use: None disclosed  Alcohol use: None disclosed  Tobacco use: None disclosed    Legal History:   None    Medications:     Current Outpatient Medications:     amLODIPine (NORVASC) 5 MG tablet, Take 1 tablet by mouth Daily., Disp: , Rfl:     aspirin 81 MG EC tablet, Take 1 tablet by mouth Daily. occasionally, Disp: , Rfl:     atorvastatin (LIPITOR) 20 MG tablet, Take 1 tablet by mouth Every Night., Disp: 90 tablet, Rfl: 3    busPIRone (BUSPAR) 10 MG tablet, Take 1 tablet by mouth 3 (Three) Times a Day. Take every day, to help  "control anxiety, Disp: 270 tablet, Rfl: 3    Cholecalciferol (Vitamin D3) 25 MCG (1000 UT) capsule, Take 1 capsule by mouth Daily., Disp: , Rfl:     citalopram (CeleXA) 40 MG tablet, TAKE 1 TABLET BY MOUTH EVERY DAY, Disp: 90 tablet, Rfl: 3    clopidogrel (PLAVIX) 75 MG tablet, Take 1 tablet by mouth Daily., Disp: 90 tablet, Rfl: 3    donepezil (ARICEPT) 10 MG tablet, Take 1 tablet by mouth Every Evening., Disp: 90 tablet, Rfl: 3    doxycycline (DORYX) 100 MG enteric coated tablet, Take 1 tablet by mouth Daily., Disp: , Rfl:     lisinopril (PRINIVIL,ZESTRIL) 10 MG tablet, Take 1 tablet by mouth Daily., Disp: 90 tablet, Rfl: 3    metFORMIN ER (GLUCOPHAGE-XR) 500 MG 24 hr tablet, TAKE 1 TABLET BY MOUTH EVERY DAY, Disp: 90 tablet, Rfl: 3    traZODone (DESYREL) 50 MG tablet, Take 1-2 pills po 30-60 min before bedtime to help with nerves and sleep, Disp: 90 tablet, Rfl: 3    Brexpiprazole 0.5 MG tablet, Take 0.5 mg by mouth Daily., Disp: 30 tablet, Rfl: 0    Medication Considerations:  PARK reviewed and appropriate.      Allergies:   No Known Allergies    Objective     Physical Exam:  Vital Signs:   Vitals:    02/14/25 1356   BP: 130/82   Pulse: 63   SpO2: 94%   Weight: 83 kg (183 lb)   Height: 177.8 cm (70\")     Body mass index is 26.26 kg/m².     Mental Status Exam:   Hygiene:   good  Cooperation:  Cooperative  Eye Contact:  Good  Psychomotor Behavior:  Appropriate  Affect:  Appropriate  Mood: normal  Speech:  Normal  Thought Process:  Circum  Thought Content:  Normal  Suicidal:  None  Homicidal:  None  Hallucinations:  None  Delusion:  None  Memory:  Deficits  Orientation:  Person, Place, Time, and Situation  Reliability:  fair  Insight:  Fair  Judgement:  Fair  Impulse Control:  Fair    Assessment / Plan      Visit Diagnosis/Orders Placed This Visit:  Diagnoses and all orders for this visit:    1. DEEP (generalized anxiety disorder) (Primary)    2. 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  Dispense: 30 tablet; Refill: 0         Functional Status: Moderate impairment     Prognosis: Good with Ongoing Treatment     Impression/Formulation:  Patient appeared alert and oriented.  Patient is voluntarily requesting to begin psychiatric medication management at [facility].  Patient is receptive to assistance with maintaining a stable lifestyle.  Patient presents with history of agitation associated with dementia. He has been treated for anxiety for several years with buspirone and citalopram. He has not been taking the buspirone consistently. He was encouraged to take this medication three times daily as prescribed. He does not recall taking the prescribed trazodone, but does report that he sleeps well. Discussed options to better address agitation. Brexpiprazole (Rexulti) is a natural first choice given its FDA approval for agitation associated with dementia. Sample pack was provided and prescription sent to preferred pharmacy. Encouraged patient to increase physical activity as tolerated, with emphasis on safety. Patient and spouse verbalize understanding and agreement to today's plan.     ICD-10-CM ICD-9-CM   1. DEEP (generalized anxiety disorder)  F41.1 300.02   2. 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     Treatment Plan:   Continue buspirone (Buspar) 10 mg TID  Continue citalopram (Celexa) 40 mg daily  Initiate brexpiprazole (Rexulti) 0.5 mg daily-recommend taking at night given possible sedation    Patient will continue supportive psychotherapy efforts and medications as indicated. Clinic will obtain release of information for current treatment team for continuity of care as needed. Patient will contact this office, call 911 or present to the nearest emergency room should suicidal or homicidal ideations occur. Discussed medication options and treatment plan of prescribed  medication(s) as well as the risks, benefits, and potential side effects. Patient ackowledged and verbally consented to continue with current treatment plan and was educated on the importance of compliance with treatment and follow-up appointments.     Follow Up:   Return in about 4 weeks (around 3/14/2025) for Next scheduled follow up.  It was a pleasure meeting with you both today. Thank you for allowing me to be a part of your care. Please call with any questions or concerns prior to our next appointment.        RANI James   [facility]     This is electronically signed by RANI James   02/14/2025 15:07 EST

## 2025-02-14 NOTE — OUTREACH NOTE
Prep Survey      Flowsheet Row Responses   [facility] patient discharged from? Non-[facility]   Is LACE score < 7 ? Non-BH Discharge   Eligibility Not Eligible   What are the reasons patient is not eligible? Other  [not a hospital encounter]   Does the patient have one of the following disease processes/diagnoses(primary or secondary)? Other   Prep survey completed? Yes            Sophia Sam Registered Nurse

## 2025-02-18 ENCOUNTER — TELEPHONE (OUTPATIENT)
Dept: BEHAVIORAL HEALTH | Facility: CLINIC | Age: 85
End: 2025-02-18
Payer: MEDICARE

## 2025-02-18 NOTE — TELEPHONE ENCOUNTER
PATIENT'S WIFE PICKED UP A SAMPLE OF REXULTI 0.5.    Patient called stating her pharmacy is closed and would like her prescriptions sent to     [facility]  [address]  [place] [address]  P- [phone]    Medications:    SUMAtriptan (IMITREX) 50 MG tablet     famotidine (PEPCID) 20 MG tablet

## 2025-02-28 ENCOUNTER — TELEPHONE (OUTPATIENT)
Dept: BEHAVIORAL HEALTH | Facility: CLINIC | Age: 85
End: 2025-02-28
Payer: MEDICARE

## 2025-03-14 ENCOUNTER — OFFICE VISIT (OUTPATIENT)
Dept: BEHAVIORAL HEALTH | Facility: CLINIC | Age: 85
End: 2025-03-14
Payer: MEDICARE

## 2025-03-14 VITALS
DIASTOLIC BLOOD PRESSURE: 74 MMHG | WEIGHT: 191 LBS | BODY MASS INDEX: 27.35 KG/M2 | OXYGEN SATURATION: 78 % | SYSTOLIC BLOOD PRESSURE: 130 MMHG | HEART RATE: 95 BPM | HEIGHT: 70 IN

## 2025-03-14 DIAGNOSIS — F41.1 GAD (GENERALIZED ANXIETY DISORDER): Primary | ICD-10-CM

## 2025-03-14 DIAGNOSIS — F02.A11 MILD LATE ONSET ALZHEIMER'S DEMENTIA WITH AGITATION: ICD-10-CM

## 2025-03-14 DIAGNOSIS — G30.1 MILD LATE ONSET ALZHEIMER'S DEMENTIA WITH AGITATION: ICD-10-CM

## 2025-03-14 NOTE — PROGRESS NOTES
Follow Up Office Visit      Patient Name: Orestes Garcia Jr.  : 1940   MRN: [id number]     Referring Provider: Jose Alberto Vang MD    Chief Complaint:      ICD-10-CM ICD-9-CM   1. DEEP (generalized anxiety disorder)  F41.1 300.02   2. 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 is a 85 y.o. male who is here today for follow up related to anxiety and agitation associated with dementia.     Subjective      Changes to Physical Health or Medications since Last Visit: No changes to physical health or medications since last visit.     Patient Reports: Since starting the brexpiprazole patient and spouse report significant improvement. Anxiety and agitation have decreased.     Screening Scores:    PHQ-9 at last visit: 14  DEEP 7 at last visit: 16  Patient screened positive for depression based on a PHQ-9 score of 14 on 2025. Follow-up recommendations include: Prescribed antidepressant medication treatment.     RISK ASSESSMENT:  Patient denies any high risk factors today.    Medications:     Current Outpatient Medications:     amLODIPine (NORVASC) 5 MG tablet, Take 1 tablet by mouth Daily., Disp: , Rfl:     aspirin 81 MG EC tablet, Take 1 tablet by mouth Daily. occasionally, Disp: , Rfl:     atorvastatin (LIPITOR) 20 MG tablet, Take 1 tablet by mouth Every Night., Disp: 90 tablet, Rfl: 3    Brexpiprazole 0.5 MG tablet, Take 0.5 mg by mouth Daily., Disp: 30 tablet, Rfl: 0    busPIRone (BUSPAR) 10 MG tablet, Take 1 tablet by mouth 3 (Three) Times a Day. Take every day, to help control anxiety, Disp: 270 tablet, Rfl: 3    Cholecalciferol (Vitamin D3) 25 MCG (1000 UT) capsule, Take 1 capsule by mouth  "Daily., Disp: , Rfl:     citalopram (CeleXA) 40 MG tablet, TAKE 1 TABLET BY MOUTH EVERY DAY, Disp: 90 tablet, Rfl: 3    clopidogrel (PLAVIX) 75 MG tablet, Take 1 tablet by mouth Daily., Disp: 90 tablet, Rfl: 3    donepezil (ARICEPT) 10 MG tablet, Take 1 tablet by mouth Every Evening., Disp: 90 tablet, Rfl: 3    doxycycline (DORYX) 100 MG enteric coated tablet, Take 1 tablet by mouth Daily., Disp: , Rfl:     lisinopril (PRINIVIL,ZESTRIL) 10 MG tablet, Take 1 tablet by mouth Daily., Disp: 90 tablet, Rfl: 3    metFORMIN ER (GLUCOPHAGE-XR) 500 MG 24 hr tablet, TAKE 1 TABLET BY MOUTH EVERY DAY, Disp: 90 tablet, Rfl: 3    traZODone (DESYREL) 50 MG tablet, Take 1-2 pills po 30-60 min before bedtime to help with nerves and sleep, Disp: 90 tablet, Rfl: 3    Medication Considerations:  PARK reviewed and appropriate.      Allergies:   No Known Allergies    Objective     Physical Exam:  Vital Signs:   Vitals:    03/14/25 1311   BP: 130/74   Pulse: 95   SpO2: (!) 78%   Weight: 86.6 kg (191 lb)   Height: 177.8 cm (70\")     Body mass index is 27.41 kg/m².     Mental Status Exam:   Hygiene:   good  Cooperation:  Cooperative  Eye Contact:  Good  Psychomotor Behavior:  Appropriate  Affect:  Appropriate  Mood: normal  Speech:  Normal  Thought Process:  Goal directed and Circum  Thought Content:  Normal  Suicidal:  None  Homicidal:  None  Hallucinations:  None  Delusion:  None  Memory:  Deficits  Orientation:  Person, Place, and Time  Reliability:  fair  Insight:  Fair  Judgement:  Fair  Impulse Control:  Fair      Assessment / Plan      Visit Diagnosis/Orders Placed This Visit:  Diagnoses and all orders for this visit:    1. DEEP (generalized anxiety disorder) (Primary)    2. 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 Patient is receptive to assistance with maintaining a stable " lifestyle.  Patient presents with history of anxiety and agitation associated with dementia. Since starting the brexpiprazole, patient and spouse report significant improvement. They deny any negative side effects and deny any additional concerns to address today.     ICD-10-CM ICD-9-CM   1. DEEP (generalized anxiety disorder)  F41.1 300.02   2. 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     Treatment Plan:     Continue brexpiprazole (Rexulti) 0.5 mg daily  Continue citalopram (Celexa) 40 mg daily  Continue buspirone (Buspar) 10 mg TID- patient reports not taking regularly at this time    Patient will continue supportive psychotherapy efforts and medications as indicated. Clinic will obtain release of information for current treatment team for continuity of care as needed. Patient will contact this office, call 911 or present to the nearest emergency room should suicidal or homicidal ideations occur.  Discussed medication options and treatment plan of prescribed medication(s) as well as the risks, benefits, and potential side effects. Patient ackowledged and verbally consented to continue with current treatment plan and was educated on the importance of compliance with treatment and follow-up appointments.     Follow Up:   Return in about 3 months (around 6/14/2025) for Next scheduled follow up.  It was so nice to see you today. Please let me know if you have any questions before our next appointment.        RANI James   [facility]     This is electronically signed by RANI James   03/14/2025  13:46 EDT

## 2025-03-27 ENCOUNTER — OFFICE VISIT (OUTPATIENT)
Dept: FAMILY MEDICINE CLINIC | Facility: CLINIC | Age: 85
End: 2025-03-27
Payer: MEDICARE

## 2025-03-27 VITALS
HEIGHT: 69 IN | WEIGHT: 190.9 LBS | HEART RATE: 70 BPM | SYSTOLIC BLOOD PRESSURE: 110 MMHG | BODY MASS INDEX: 28.27 KG/M2 | DIASTOLIC BLOOD PRESSURE: 62 MMHG

## 2025-03-27 DIAGNOSIS — Z79.899 ENCOUNTER FOR LONG-TERM (CURRENT) USE OF HIGH-RISK MEDICATION: ICD-10-CM

## 2025-03-27 DIAGNOSIS — G30.1 MILD LATE ONSET ALZHEIMER'S DEMENTIA WITH AGITATION: ICD-10-CM

## 2025-03-27 DIAGNOSIS — F32.4 MAJOR DEPRESSIVE DISORDER WITH SINGLE EPISODE, IN PARTIAL REMISSION: ICD-10-CM

## 2025-03-27 DIAGNOSIS — Z00.01 ENCOUNTER FOR GENERAL ADULT MEDICAL EXAMINATION WITH ABNORMAL FINDINGS: ICD-10-CM

## 2025-03-27 DIAGNOSIS — F02.A11 MILD LATE ONSET ALZHEIMER'S DEMENTIA WITH AGITATION: ICD-10-CM

## 2025-03-27 DIAGNOSIS — E11.22 TYPE 2 DIABETES MELLITUS WITH STAGE 3A CHRONIC KIDNEY DISEASE, WITHOUT LONG-TERM CURRENT USE OF INSULIN: ICD-10-CM

## 2025-03-27 DIAGNOSIS — R53.83 OTHER FATIGUE: ICD-10-CM

## 2025-03-27 DIAGNOSIS — I10 BENIGN ESSENTIAL HYPERTENSION: ICD-10-CM

## 2025-03-27 DIAGNOSIS — N39.0 RECURRENT UTI: ICD-10-CM

## 2025-03-27 DIAGNOSIS — N18.31 TYPE 2 DIABETES MELLITUS WITH STAGE 3A CHRONIC KIDNEY DISEASE, WITHOUT LONG-TERM CURRENT USE OF INSULIN: ICD-10-CM

## 2025-03-27 DIAGNOSIS — E78.5 HYPERLIPIDEMIA LDL GOAL <70: ICD-10-CM

## 2025-03-27 DIAGNOSIS — F41.1 GAD (GENERALIZED ANXIETY DISORDER): ICD-10-CM

## 2025-03-27 DIAGNOSIS — Z00.00 MEDICARE ANNUAL WELLNESS VISIT, SUBSEQUENT: Primary | ICD-10-CM

## 2025-03-27 DIAGNOSIS — K58.0 IRRITABLE BOWEL SYNDROME WITH DIARRHEA: ICD-10-CM

## 2025-03-27 DIAGNOSIS — R41.3 MEMORY LOSS: ICD-10-CM

## 2025-03-27 LAB
EXPIRATION DATE: NORMAL
Lab: NORMAL
POC ALBUMIN, URINE: 150 MG/L
POC CREATININE, URINE: 100 MG/DL
POC URINE ALB/CREA RATIO: >300

## 2025-03-27 NOTE — ASSESSMENT & PLAN NOTE
well-controlled with metformin extended release 500 mg once a day and he will continue    Orders:    Hemoglobin A1c

## 2025-03-27 NOTE — ASSESSMENT & PLAN NOTE
Patient continues on Aricept 10 mg daily and his wife provides a lot of assistance and he is doing well

## 2025-03-27 NOTE — ASSESSMENT & PLAN NOTE
Comanaged with psychiatry and doing much better with the addition of Rexulti 0.5 mg daily and continuation of Celexa 40 mg daily and he will continue

## 2025-03-27 NOTE — PROGRESS NOTES
Subjective   The ABCs of the Annual Wellness Visit  Medicare Wellness Visit      Orestes Garcia Jr. is a 85 y.o. patient who presents for a Medicare Wellness Visit.    The following portions of the patient's history were reviewed and   updated as appropriate: allergies, current medications, past family history, past medical history, past social history, past surgical history, and problem list.    Compared to one year ago, the patient's physical   health is the same.  Compared to one year ago, the patient's mental   health is better.    Recent Hospitalizations:  This patient has had a [facility] admission record on file within the last 365 days.  Current Medical Providers:  Patient Care Team:  Jose Alberto Vang MD as PCP - General (Family Medicine)  Kadi Serrano APRN as Nurse Practitioner (Psychiatry)    Outpatient Medications Prior to Visit   Medication Sig Dispense Refill    amLODIPine (NORVASC) 5 MG tablet Take 1 tablet by mouth Daily.      aspirin 81 MG EC tablet Take 1 tablet by mouth Daily. occasionally      atorvastatin (LIPITOR) 20 MG tablet Take 1 tablet by mouth Every Night. 90 tablet 3    Brexpiprazole 0.5 MG tablet Take 0.5 mg by mouth Daily. 30 tablet 0    busPIRone (BUSPAR) 10 MG tablet Take 1 tablet by mouth 3 (Three) Times a Day. Take every day, to help control anxiety 270 tablet 3    Cholecalciferol (Vitamin D3) 25 MCG (1000 UT) capsule Take 1 capsule by mouth Daily.      citalopram (CeleXA) 40 MG tablet TAKE 1 TABLET BY MOUTH EVERY DAY 90 tablet 3    clopidogrel (PLAVIX) 75 MG tablet Take 1 tablet by mouth Daily. 90 tablet 3    donepezil (ARICEPT) 10 MG tablet Take 1 tablet by mouth Every Evening. 90 tablet 3    doxycycline (DORYX) 100 MG enteric coated tablet Take 1 tablet by mouth Daily.      lisinopril (PRINIVIL,ZESTRIL) 10 MG tablet Take 1 tablet by mouth Daily. 90 tablet 3    metFORMIN ER (GLUCOPHAGE-XR) 500 MG 24 hr tablet TAKE 1 TABLET BY MOUTH EVERY DAY 90 tablet 3    traZODone  "(DESYREL) 50 MG tablet Take 1-2 pills po 30-60 min before bedtime to help with nerves and sleep 90 tablet 3     No facility-administered medications prior to visit.     No opioid medication identified on active medication list. I have reviewed chart for other potential  high risk medication/s and harmful drug interactions in the elderly.      Aspirin is on active medication list. Aspirin use is indicated based on review of current medical condition/s. Pros and cons of this therapy have been discussed today. Benefits of this medication outweigh potential harm.  Patient has been encouraged to continue taking this medication.  .      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  ACP discussion was held with the patient during this visit. Patient has an advance directive (not in EMR), copy requested.            Objective   Vitals:    03/27/25 1401   BP: 110/62   BP Location: Left arm   Patient Position: Sitting   Cuff Size: Adult   Pulse: 70   Weight: 86.6 kg (190 lb 14.4 oz)   Height: 175.3 cm (69\")   PainSc: 0-No pain       Estimated body mass index is 28.19 kg/m² as calculated from the following:    Height as of this encounter: 175.3 cm (69\").    Weight as of this encounter: 86.6 kg (190 lb 14.4 oz).    BMI is >= 25 " and <30. (Overweight) The following options were offered after discussion;: exercise counseling/recommendations and nutrition counseling/recommendations           Does the patient have evidence of cognitive impairment? Yes                                                                                                Health  Risk Assessment    Smoking Status:  Social History     Tobacco Use   Smoking Status Never   Smokeless Tobacco Never     Alcohol Consumption:  Social History     Substance and Sexual Activity   Alcohol Use Never       Fall Risk Screen  STEADI Fall Risk Assessment was completed, and patient is at LOW risk for falls.Assessment completed on:3/27/2025    Depression Screening   Little interest or pleasure in doing things? Not at all   Feeling down, depressed, or hopeless? Not at all   PHQ-2 Total Score 0      Health Habits and Functional and Cognitive Screening:      3/27/2025     2:04 PM   Functional & Cognitive Status   Do you have difficulty preparing food and eating? No   Do you have difficulty bathing yourself, getting dressed or grooming yourself? No   Do you have difficulty using the toilet? No   Do you have difficulty moving around from place to place? No   Do you have trouble with steps or getting out of a bed or a chair? No   Current Diet Well Balanced Diet   Dental Exam Up to date   Eye Exam Up to date   Exercise (times per week) 7 times per week   Current Exercises Include Walking   Do you need help using the phone?  No   Are you deaf or do you have serious difficulty hearing?  Yes   Do you need help to go to places out of walking distance? No   Do you need help shopping? No   Do you need help preparing meals?  No   Do you need help with housework?  No   Do you need help with laundry? No   Do you need help taking your medications? No   Do you need help managing money? No   Do you ever drive or ride in a car without wearing a seat belt? No   Have you felt unusual stress, anger or loneliness  in the last month? No   Who do you live with? Spouse   If you need help, do you have trouble finding someone available to you? No   Have you been bothered in the last four weeks by sexual problems? No   Do you have difficulty concentrating, remembering or making decisions? No           Age-appropriate Screening Schedule:  Refer to the list below for future screening recommendations based on patient's age, sex and/or medical conditions. Orders for these recommended tests are listed in the plan section. The patient has been provided with a written plan.    Health Maintenance List  Health Maintenance   Topic Date Due    URINE MICROALBUMIN-CREATININE RATIO (uACR)  Never done    ZOSTER VACCINE (1 of 2) Never done    COVID-19 Vaccine (5 - 2024-25 season) 09/01/2024    DIABETIC EYE EXAM  01/18/2025    LIPID PANEL  02/16/2025    HEMOGLOBIN A1C  03/18/2025    RSV Vaccine - Adults (1 - 1-dose 75+ series) 12/12/2025 (Originally 3/1/2013)    TDAP/TD VACCINES (1 - Tdap) 12/12/2025 (Originally 3/1/1959)    ANNUAL WELLNESS VISIT  03/27/2026    INFLUENZA VACCINE  Completed    Pneumococcal Vaccine 50+  Completed                                                                                                                                                CMS Preventative Services Quick Reference  Risk Factors Identified During Encounter  None Identified    The above risks/problems have been discussed with the patient.  Pertinent information has been shared with the patient in the After Visit Summary.  An After Visit Summary and PPPS were made available to the patient.    Follow Up:   Next Medicare Wellness visit to be scheduled in 1 year.         Additional E&M Note during same encounter follows:  Patient has additional, significant, and separately identifiable condition(s)/problem(s) that require work above and beyond the Medicare Wellness Visit     Chief Complaint  Medicare Wellness-subsequent and Annual Exam    Subjective  "  JIA Carlisle is also being seen today for an annual adult preventative physical exam.   Patient is feeling very well today.  He has mild dementia so his wife helps to fill in the gaps with his history.  Psychiatry started him on Rexulti 0.5 mg a couple weeks ago, and that is helping his mood tremendously.  He denies any new problems today.  Says his hypertension and diabetes have remained under good control at home.  Review of Systems   Constitutional:  Negative for activity change, appetite change, chills and fever.   HENT:  Negative for nosebleeds and sore throat.    Eyes:  Negative for visual disturbance.   Respiratory:  Negative for cough, choking, chest tightness and shortness of breath.    Cardiovascular:  Negative for chest pain, palpitations and leg swelling.   Gastrointestinal:  Negative for abdominal pain, blood in stool, constipation, diarrhea, nausea and vomiting.   Endocrine: Negative for polydipsia, polyphagia and polyuria.   Genitourinary:  Negative for dysuria and hematuria.   Musculoskeletal:  Negative for arthralgias, back pain, joint swelling and myalgias.   Skin:  Negative for rash and wound.   Neurological:  Negative for seizures, syncope, speech difficulty, weakness and light-headedness.   Hematological:  Negative for adenopathy.   Psychiatric/Behavioral:  Negative for dysphoric mood and sleep disturbance. The patient is not nervous/anxious.               Objective   Vital Signs:  /62 (BP Location: Left arm, Patient Position: Sitting, Cuff Size: Adult)   Pulse 70   Ht 175.3 cm (69\")   Wt 86.6 kg (190 lb 14.4 oz)   BMI 28.19 kg/m²   Physical Exam  Constitutional:       General: He is not in acute distress.     Appearance: Normal appearance. He is not toxic-appearing.   HENT:      Head: Normocephalic and atraumatic.      Right Ear: Ear canal and external ear normal.      Left Ear: Ear canal and external ear normal.      Nose: Nose normal.      Mouth/Throat:      Mouth: Mucous membranes " are moist.      Pharynx: Oropharynx is clear.   Eyes:      General: No scleral icterus.     Extraocular Movements: Extraocular movements intact.      Conjunctiva/sclera: Conjunctivae normal.      Pupils: Pupils are equal, round, and reactive to light.   Neck:      Vascular: No carotid bruit.   Cardiovascular:      Rate and Rhythm: Normal rate and regular rhythm.      Pulses: Normal pulses.      Heart sounds: Normal heart sounds.   Pulmonary:      Effort: Pulmonary effort is normal.      Breath sounds: Normal breath sounds.   Chest:      Chest wall: No tenderness.   Abdominal:      General: Bowel sounds are normal. There is no distension.      Palpations: Abdomen is soft.      Tenderness: There is no abdominal tenderness. There is no guarding or rebound.   Musculoskeletal:         General: No swelling or deformity. Normal range of motion.      Cervical back: Normal range of motion. No rigidity.      Right lower leg: No edema.      Left lower leg: No edema.   Lymphadenopathy:      Cervical: No cervical adenopathy.   Skin:     General: Skin is warm and dry.      Capillary Refill: Capillary refill takes less than 2 seconds.      Coloration: Skin is not jaundiced or pale.      Findings: No erythema or rash.   Neurological:      General: No focal deficit present.      Mental Status: He is alert. Mental status is at baseline.      Cranial Nerves: No cranial nerve deficit, dysarthria or facial asymmetry.      Motor: No weakness.      Coordination: Coordination normal.      Gait: Gait abnormal (Uses cane).      Comments: Oriented to person and place only   Psychiatric:         Attention and Perception: Attention and perception normal.         Mood and Affect: Mood and affect normal.         Speech: Speech normal.         Behavior: Behavior normal.         Thought Content: Thought content normal.         Cognition and Memory: Memory is impaired.         Judgment: Judgment normal.                    Assessment and Plan       Medicare annual wellness visit, subsequent         Encounter for general adult medical examination with abnormal findings  Healthy lifestyle measures including healthy diet regular exercise and weight control were discussed.  Preventive healthcare measures also discussed.  Patient will continue current medications and we will check labs.  If all goes well I will see him back in 6 months or sooner if needed.       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    Patient continues on Aricept 10 mg daily and his wife provides a lot of assistance and he is doing well       Major depressive disorder with single episode, in partial remission    Comanaged with psychiatry and doing much better with the addition of Rexulti 0.5 mg daily and continuation of Celexa 40 mg daily and he will continue       DEEP (generalized anxiety disorder)    Comanaged with psychiatry, see above, in addition to taking BuSpar 10 mg 3 times a day       Other fatigue    Orders:    TSH+Free T4    Recurrent UTI  Doing better with help of urology and he is on doxycycline for suppressive therapy and doing well       Memory loss    Orders:    Folate    Vitamin B12            Follow Up   Return in about 6 months (around 9/27/2025) for Recheck.  Patient was given instructions and counseling regarding his condition or for health maintenance advice. Please see specific information pulled into the AVS if appropriate.

## 2025-03-28 LAB
ALBUMIN SERPL-MCNC: 4.1 G/DL (ref 3.7–4.7)
ALP SERPL-CCNC: 99 IU/L (ref 44–121)
ALT SERPL-CCNC: 19 IU/L (ref 0–44)
AST SERPL-CCNC: 23 IU/L (ref 0–40)
BASOPHILS # BLD AUTO: 0.1 X10E3/UL (ref 0–0.2)
BASOPHILS NFR BLD AUTO: 1 %
BILIRUB SERPL-MCNC: 0.6 MG/DL (ref 0–1.2)
BUN SERPL-MCNC: 20 MG/DL (ref 8–27)
BUN/CREAT SERPL: 14 (ref 10–24)
CALCIUM SERPL-MCNC: 9.8 MG/DL (ref 8.6–10.2)
CHLORIDE SERPL-SCNC: 104 MMOL/L (ref 96–106)
CHOLEST SERPL-MCNC: 142 MG/DL (ref 100–199)
CO2 SERPL-SCNC: 23 MMOL/L (ref 20–29)
CREAT SERPL-MCNC: 1.38 MG/DL (ref 0.76–1.27)
EGFRCR SERPLBLD CKD-EPI 2021: 49 ML/MIN/1.73
EOSINOPHIL # BLD AUTO: 0.2 X10E3/UL (ref 0–0.4)
EOSINOPHIL NFR BLD AUTO: 3 %
ERYTHROCYTE [DISTWIDTH] IN BLOOD BY AUTOMATED COUNT: 13.2 % (ref 11.6–15.4)
FOLATE SERPL-MCNC: 5.2 NG/ML
GLOBULIN SER CALC-MCNC: 3.3 G/DL (ref 1.5–4.5)
GLUCOSE SERPL-MCNC: 223 MG/DL (ref 70–99)
HBA1C MFR BLD: 8.2 % (ref 4.8–5.6)
HCT VFR BLD AUTO: 46.1 % (ref 37.5–51)
HDLC SERPL-MCNC: 40 MG/DL
HGB BLD-MCNC: 14.6 G/DL (ref 13–17.7)
IMM GRANULOCYTES # BLD AUTO: 0.1 X10E3/UL (ref 0–0.1)
IMM GRANULOCYTES NFR BLD AUTO: 1 %
LDLC SERPL CALC-MCNC: 71 MG/DL (ref 0–99)
LYMPHOCYTES # BLD AUTO: 1.4 X10E3/UL (ref 0.7–3.1)
LYMPHOCYTES NFR BLD AUTO: 18 %
MAGNESIUM SERPL-MCNC: 2.2 MG/DL (ref 1.6–2.3)
MCH RBC QN AUTO: 29.4 PG (ref 26.6–33)
MCHC RBC AUTO-ENTMCNC: 31.7 G/DL (ref 31.5–35.7)
MCV RBC AUTO: 93 FL (ref 79–97)
MONOCYTES # BLD AUTO: 0.8 X10E3/UL (ref 0.1–0.9)
MONOCYTES NFR BLD AUTO: 11 %
NEUTROPHILS # BLD AUTO: 5.1 X10E3/UL (ref 1.4–7)
NEUTROPHILS NFR BLD AUTO: 66 %
PLATELET # BLD AUTO: 188 X10E3/UL (ref 150–450)
POTASSIUM SERPL-SCNC: 4.3 MMOL/L (ref 3.5–5.2)
PROT SERPL-MCNC: 7.4 G/DL (ref 6–8.5)
RBC # BLD AUTO: 4.96 X10E6/UL (ref 4.14–5.8)
SODIUM SERPL-SCNC: 141 MMOL/L (ref 134–144)
T4 FREE SERPL-MCNC: 0.9 NG/DL (ref 0.82–1.77)
TRIGL SERPL-MCNC: 181 MG/DL (ref 0–149)
TSH SERPL DL<=0.005 MIU/L-ACNC: 4.62 UIU/ML (ref 0.45–4.5)
VIT B12 SERPL-MCNC: 460 PG/ML (ref 232–1245)
VLDLC SERPL CALC-MCNC: 31 MG/DL (ref 5–40)
WBC # BLD AUTO: 7.6 X10E3/UL (ref 3.4–10.8)

## 2025-03-28 RX ORDER — METFORMIN HYDROCHLORIDE 500 MG/1
1000 TABLET, EXTENDED RELEASE ORAL DAILY
Qty: 180 TABLET | Refills: 3 | Status: SHIPPED | OUTPATIENT
Start: 2025-03-28

## 2025-04-07 ENCOUNTER — TELEPHONE (OUTPATIENT)
Dept: FAMILY MEDICINE CLINIC | Facility: CLINIC | Age: 85
End: 2025-04-07

## 2025-04-07 DIAGNOSIS — F02.A11 MILD LATE ONSET ALZHEIMER'S DEMENTIA WITH AGITATION: ICD-10-CM

## 2025-04-07 DIAGNOSIS — G30.1 MILD LATE ONSET ALZHEIMER'S DEMENTIA WITH AGITATION: ICD-10-CM

## 2025-04-07 NOTE — TELEPHONE ENCOUNTER
Caller: Kim Garcia    Relationship: Emergency Contact    Best call back number: [phone]    What medication are you requesting: ONE TOUCH TEST STRIPS    If a prescription is needed, what is your preferred pharmacy and phone number: [facility]  [place] - [address] [facility] [phone] [facility] [phone] FX

## 2025-04-15 DIAGNOSIS — G30.1 MILD LATE ONSET ALZHEIMER'S DEMENTIA WITH AGITATION: ICD-10-CM

## 2025-04-15 DIAGNOSIS — F02.A11 MILD LATE ONSET ALZHEIMER'S DEMENTIA WITH AGITATION: ICD-10-CM

## 2025-04-15 RX ORDER — BREXPIPRAZOLE 0.5 MG/1
1 TABLET ORAL DAILY
Qty: 30 TABLET | Refills: 0 | OUTPATIENT
Start: 2025-04-15

## 2025-05-01 ENCOUNTER — OFFICE VISIT (OUTPATIENT)
Dept: CARDIOLOGY | Facility: CLINIC | Age: 85
End: 2025-05-01
Payer: MEDICARE

## 2025-05-01 VITALS
OXYGEN SATURATION: 96 % | HEIGHT: 69 IN | TEMPERATURE: 97 F | WEIGHT: 190 LBS | RESPIRATION RATE: 18 BRPM | HEART RATE: 86 BPM | BODY MASS INDEX: 28.14 KG/M2 | DIASTOLIC BLOOD PRESSURE: 68 MMHG | SYSTOLIC BLOOD PRESSURE: 114 MMHG

## 2025-05-01 DIAGNOSIS — I10 BENIGN ESSENTIAL HYPERTENSION: Primary | ICD-10-CM

## 2025-05-01 DIAGNOSIS — E78.5 HYPERLIPIDEMIA LDL GOAL <70: ICD-10-CM

## 2025-05-01 DIAGNOSIS — R00.1 BRADYCARDIA, SINUS: ICD-10-CM

## 2025-05-01 DIAGNOSIS — I65.23 CAROTID ARTERY STENOSIS, ASYMPTOMATIC, BILATERAL: ICD-10-CM

## 2025-05-01 NOTE — PROGRESS NOTES
[facility] [place]  [facility] CARDIOLOGY  [address] [place] [address]  Dept: [phone]  Dept Fax: [phone]    Orestes Garcia Jr.  1940    Follow Up Office Visit Note    History of Present Illness:  Orestes Garcia Jr. is a 85 y.o. male who presents to the clinic for Follow-up. Hypertension- He denies any complaints on  on Amlodipine 5 mg and Lisinopril 10 mg, BP is 135.80 his EKG today  has sinus Bradycardia HR 41 and asymptomatic, prior EKG has shown HR over 60, he has been taking for the last 3 months Rexalti and is possible causing the Hr slower, advised to discuss with his mental health provider ,mean time will give him a Holter 48 hours, will see him back in 2 weeks    The following portions of the patient's history were reviewed and updated as appropriate: allergies, current medications, past family history, past medical history, past social history, past surgical history, and problem list.    Medications:  amLODIPine  aspirin  atorvastatin  Brexpiprazole tablet  busPIRone  citalopram  clopidogrel  donepezil  doxycycline  glucose blood  lisinopril  metFORMIN ER  traZODone  Vitamin D3 capsule    Subjective  No Known Allergies     Past Medical History:   Diagnosis Date   • Anemia    • Benign essential HTN    • Carotid artery stenosis 07/2020    RIGHT CAROTID  40-60% STENOSIS ON DOPPLER RECORD SAYS H/O TIA BUT PT DENIES THIS??   • CKD (chronic kidney disease), stage III    • Colon polyp 2012    LAST C QUESTIONABLE   • Condition not found 2018    COLOGUARD NEG 9-18   • Dementia    • Depression    • Diverticular disease of colon     WITHOUT HEMORRHAGE   • Diverticulosis     WITH LOWR GI BLEED APPROS AGE 49   • Gastroenteritis due to norovirus 2024    Admitted to [facility] for 1 week, therapy at [facility] before returning home   • Hearing loss    • High risk medication use    • IBS (irritable bowel syndrome)     WITH DIARRHEA   • Mixed hyperlipidemia    • Overweight (BMI  25.0-29.9)    • Pancreatitis, acute 2024    possible, had acute Norovirus infection, no other cause identified, in [facility] 2d, pancreatic enzymes OK when admitted to [facility]   • Sepsis 2024    staph, in IV site of left arm initially, finishing 6 weeks of IV Atbx per Infectious Dis.   • Skin cancer     LEG   • Transient cerebral ischemia 2019    POSSIBLE A/W FALL   • Type 2 diabetes mellitus     WITH STAGE 3 CKD WITHOUT LONG TERM CURRENT USE OF INSULIN   WITH POTEINURIA       Past Surgical History:   Procedure Laterality Date   • CATARACT EXTRACTION  2020   • COLONOSCOPY  2012    [facility]   • INTERVENTIONAL RADIOLOGY PROCEDURE Bilateral 8/31/2022    Procedure: Carotid Cerebral Angiogram;  Surgeon: Stan Falk MD;  Location: [facility] INVASIVE LOCATION;  Service: Interventional Radiology;  Laterality: Bilateral;   • SKIN BIOPSY Right 2016    LEG   • TONSILLECTOMY  1950       Family History   Problem Relation Age of Onset   • Cancer Mother    • Hypertension Mother    • Pancreatic cancer Mother    • Alzheimer's disease Father    • Coronary artery disease Father 86   • Diabetes Father    • Hearing loss Father         Social History     Socioeconomic History   • Marital status:    Tobacco Use   • Smoking status: Never   • Smokeless tobacco: Never   Vaping Use   • Vaping status: Never Used   Substance and Sexual Activity   • Alcohol use: Never   • Drug use: Never   • Sexual activity: Defer       Review of Systems   Constitutional: Negative.    HENT: Negative.     Respiratory: Negative.     Cardiovascular: Negative.    Endocrine: Negative.    Genitourinary: Negative.    Musculoskeletal: Negative.    Skin: Negative.    Allergic/Immunologic: Negative.    Neurological: Negative.    Hematological: Negative.    Psychiatric/Behavioral: Negative.       Cardiovascular Procedures    ECHO/MUGA:  STRESS TESTS:   CARDIAC CATH:   DEVICES:   HOLTER:   CT/MRI:   VASCULAR:   CARDIOTHORACIC:     Objective  Vitals:    05/01/25 1547   BP:  "114/68   BP Location: Right arm   Patient Position: Lying   Cuff Size: Adult   Pulse: 86   Resp: 18   Temp: 97 °F (36.1 °C)   TempSrc: Infrared   SpO2: 96%   Weight: 86.2 kg (190 lb)   Height: 175.3 cm (69\")   PainSc: 0-No pain     Body mass index is 28.06 kg/m².     Physical Exam  Vitals reviewed.   Constitutional:       Appearance: Healthy appearance. Not in distress.   Eyes:      Pupils: Pupils are equal, round, and reactive to light.   HENT:    Mouth/Throat:      Pharynx: Oropharynx is clear.   Neck:      Thyroid: Thyroid normal.      Vascular: No JVR. JVD normal.   Pulmonary:      Effort: Pulmonary effort is normal.      Breath sounds: Normal breath sounds. No wheezing. No rhonchi. No rales.   Chest:      Chest wall: Not tender to palpatation.   Cardiovascular:      PMI at left midclavicular line. Normal rate. Regular rhythm. Normal S1. Normal S2.       Murmurs: There is no murmur.      No gallop.  No click. No rub.   Pulses:     Intact distal pulses.      Carotid: 3+ bilaterally.     Radial: 3+ bilaterally.     Femoral: 3+ bilaterally.     Dorsalis pedis: 3+ bilaterally.     Posterior tibial: 3+ bilaterally.  Edema:     Peripheral edema absent.   Abdominal:      General: Bowel sounds are normal.      Palpations: Abdomen is soft.      Tenderness: There is no abdominal tenderness.   Musculoskeletal: Normal range of motion.         General: No tenderness.      Cervical back: Normal range of motion and neck supple. Skin:     General: Skin is warm and dry.   Neurological:      General: No focal deficit present.      Mental Status: Alert and oriented to person, place and time.        Diagnostic Data    ECG 12 Lead    Date/Time: 5/1/2025 4:19 PM  Performed by: Greg Wilkerson MD    Authorized by: Greg Wilkerson MD  Comparison: compared with previous ECG from 9/18/2024  Similar to previous ECG  Rhythm: sinus rhythm and sinus bradycardia  Rate: bradycardic  BPM: 41  Conduction: 1st degree AV block  QRS " axis: normal    Clinical impression: abnormal EKG        Assessment and Plan  Diagnoses and all orders for this visit:    Benign essential hypertension- BP is 135.80. on Amlodipine 5 mg and also Lisinopril 10 mg  -     Holter Monitor - 48 Hour; Future    Carotid artery stenosis, asymptomatic, bilateral- less than 50% on ASA and Plavix, has also 100% right vertebral artery    Hyperlipidemia LDL goal <70- On Lipitor 20 mg  -     Holter Monitor - 48 Hour; Future    Bradycardia, sinus- will get a Holter seems asymptomatic, advised to go to Er if he gets dizzy, weak, or passing out,   -     Holter Monitor - 48 Hour; Future         Return in about 6 months (around 11/1/2025) for Recheck with Dr. Wilkerson.    Greg Wilkerson MD  05/01/2025

## 2025-05-15 ENCOUNTER — OFFICE VISIT (OUTPATIENT)
Dept: CARDIOLOGY | Facility: CLINIC | Age: 85
End: 2025-05-15
Payer: MEDICARE

## 2025-05-15 VITALS
OXYGEN SATURATION: 96 % | HEART RATE: 60 BPM | DIASTOLIC BLOOD PRESSURE: 76 MMHG | WEIGHT: 190 LBS | SYSTOLIC BLOOD PRESSURE: 118 MMHG | BODY MASS INDEX: 28.14 KG/M2 | RESPIRATION RATE: 18 BRPM | HEIGHT: 69 IN

## 2025-05-15 DIAGNOSIS — I65.23 CAROTID ARTERY STENOSIS, ASYMPTOMATIC, BILATERAL: ICD-10-CM

## 2025-05-15 DIAGNOSIS — R00.1 BRADYCARDIA, SINUS: ICD-10-CM

## 2025-05-15 DIAGNOSIS — I10 BENIGN ESSENTIAL HYPERTENSION: Primary | ICD-10-CM

## 2025-05-15 DIAGNOSIS — E78.5 HYPERLIPIDEMIA LDL GOAL <70: ICD-10-CM

## 2025-05-15 PROCEDURE — 93000 ELECTROCARDIOGRAM COMPLETE: CPT | Performed by: INTERNAL MEDICINE

## 2025-05-15 PROCEDURE — 99214 OFFICE O/P EST MOD 30 MIN: CPT | Performed by: INTERNAL MEDICINE

## 2025-05-15 PROCEDURE — 1159F MED LIST DOCD IN RCRD: CPT | Performed by: INTERNAL MEDICINE

## 2025-05-15 PROCEDURE — 1160F RVW MEDS BY RX/DR IN RCRD: CPT | Performed by: INTERNAL MEDICINE

## 2025-05-15 NOTE — PROGRESS NOTES
[facility] [place]  [facility] CARDIOLOGY  [address] [place] [address]  Dept: [phone]  Dept Fax: [phone]    Orestes Garcia Jr.  1940    Follow Up Office Visit Note    History of Present Illness:  Orestes Garcia Jr. is a 85 y.o. male who presents to the clinic for Follow-up. Bradycardia, he seems asymptomatic, no complaints his HR today is better 58 was 41 before,  Holter not significant bradycardia will observ    The following portions of the patient's history were reviewed and updated as appropriate: allergies, current medications, past family history, past medical history, past social history, past surgical history, and problem list.    Medications:  amLODIPine  aspirin  atorvastatin  Brexpiprazole tablet  busPIRone  citalopram  clopidogrel  donepezil  doxycycline  glucose blood  lisinopril  metFORMIN ER  traZODone  Vitamin D3 capsule    Subjective  No Known Allergies     Past Medical History:   Diagnosis Date   • Anemia    • Benign essential HTN    • Carotid artery stenosis 07/2020    RIGHT CAROTID  40-60% STENOSIS ON DOPPLER RECORD SAYS H/O TIA BUT PT DENIES THIS??   • CKD (chronic kidney disease), stage III    • Colon polyp 2012    LAST C QUESTIONABLE   • Condition not found 2018    COLOGUARD NEG 9-18   • Dementia    • Depression    • Diverticular disease of colon     WITHOUT HEMORRHAGE   • Diverticulosis     WITH LOWR GI BLEED APPROS AGE 49   • Gastroenteritis due to norovirus 2024    Admitted to [facility] for 1 week, therapy at [facility] before returning home   • Hearing loss    • High risk medication use    • IBS (irritable bowel syndrome)     WITH DIARRHEA   • Mixed hyperlipidemia    • Overweight (BMI 25.0-29.9)    • Pancreatitis, acute 2024    possible, had acute Norovirus infection, no other cause identified, in [facility] 2d, pancreatic enzymes OK when admitted to [facility]   • Sepsis 2024    staph, in IV site of left arm initially, finishing 6 weeks of IV Atbx per Infectious  "Dis.   • Skin cancer     LEG   • Transient cerebral ischemia 2019    POSSIBLE A/W FALL   • Type 2 diabetes mellitus     WITH STAGE 3 CKD WITHOUT LONG TERM CURRENT USE OF INSULIN   WITH POTEINURIA       Past Surgical History:   Procedure Laterality Date   • CATARACT EXTRACTION  2020   • COLONOSCOPY  2012    [facility]   • INTERVENTIONAL RADIOLOGY PROCEDURE Bilateral 8/31/2022    Procedure: Carotid Cerebral Angiogram;  Surgeon: Stan Falk MD;  Location: [facility] INVASIVE LOCATION;  Service: Interventional Radiology;  Laterality: Bilateral;   • SKIN BIOPSY Right 2016    LEG   • TONSILLECTOMY  1950       Family History   Problem Relation Age of Onset   • Cancer Mother    • Hypertension Mother    • Pancreatic cancer Mother    • Alzheimer's disease Father    • Coronary artery disease Father 86   • Diabetes Father    • Hearing loss Father         Social History     Socioeconomic History   • Marital status:    Tobacco Use   • Smoking status: Never   • Smokeless tobacco: Never   Vaping Use   • Vaping status: Never Used   Substance and Sexual Activity   • Alcohol use: Never   • Drug use: Never   • Sexual activity: Defer       Review of Systems   Constitutional: Negative.    HENT: Negative.     Respiratory: Negative.     Cardiovascular: Negative.    Endocrine: Negative.    Genitourinary: Negative.    Musculoskeletal: Negative.    Skin: Negative.    Allergic/Immunologic: Negative.    Neurological: Negative.    Hematological: Negative.    Psychiatric/Behavioral: Negative.       Cardiovascular Procedures    ECHO/MUGA:  STRESS TESTS:   CARDIAC CATH:   DEVICES:   HOLTER:   CT/MRI:   VASCULAR:   CARDIOTHORACIC:     Objective  Vitals:    05/15/25 1503   BP: 118/76   BP Location: Right arm   Patient Position: Sitting   Cuff Size: Adult   Pulse: 60   Resp: 18   SpO2: 96%   Weight: 86.2 kg (190 lb)   Height: 175.3 cm (69\")   PainSc: 0-No pain     Body mass index is 28.06 kg/m².     Physical Exam  Vitals reviewed. "   Constitutional:       Appearance: Healthy appearance. Not in distress.   Eyes:      Pupils: Pupils are equal, round, and reactive to light.   HENT:    Mouth/Throat:      Pharynx: Oropharynx is clear.   Neck:      Thyroid: Thyroid normal.      Vascular: No JVR. JVD normal.   Pulmonary:      Effort: Pulmonary effort is normal.      Breath sounds: Normal breath sounds. No wheezing. No rhonchi. No rales.   Chest:      Chest wall: Not tender to palpatation.   Cardiovascular:      PMI at left midclavicular line. Normal rate. Regular rhythm. Normal S1. Normal S2.       Murmurs: There is no murmur.      No gallop.  No click. No rub.   Pulses:     Intact distal pulses.      Carotid: 3+ bilaterally.     Radial: 3+ bilaterally.     Femoral: 3+ bilaterally.     Dorsalis pedis: 3+ bilaterally.     Posterior tibial: 3+ bilaterally.  Edema:     Peripheral edema absent.   Abdominal:      General: Bowel sounds are normal.      Palpations: Abdomen is soft.      Tenderness: There is no abdominal tenderness.   Musculoskeletal: Normal range of motion.         General: No tenderness.      Cervical back: Normal range of motion and neck supple. Skin:     General: Skin is warm and dry.   Neurological:      General: No focal deficit present.      Mental Status: Alert and oriented to person, place and time.        Diagnostic Data    ECG 12 Lead    Date/Time: 5/15/2025 3:35 PM  Performed by: Greg Wilkerson MD    Authorized by: Greg Wilkerson MD  Comparison: compared with previous ECG from 5/1/2025  Similar to previous ECG  Rhythm: sinus bradycardia  Rate: bradycardic  BPM: 58  QRS axis: normal    Clinical impression: normal ECG        Assessment and Plan  Diagnoses and all orders for this visit:    Benign essential hypertension- BP is fine 120.70 on Amlodipine 5 mg and Lisinopril 10 mg    Bradycardia, sinus- today better 58    Carotid artery stenosis, asymptomatic, bilateral on ASA and Plavix- no TIA symptoms- Mild disease      Hyperlipidemia LDL goal <70- on Lipitor 20 mg         Return in about 3 months (around 8/15/2025) for Recheck with Dr. Wilkerson.    Greg Wilkerson MD  05/15/2025

## 2025-05-16 DIAGNOSIS — G30.1 MILD LATE ONSET ALZHEIMER'S DEMENTIA WITH AGITATION: ICD-10-CM

## 2025-05-16 DIAGNOSIS — F02.A11 MILD LATE ONSET ALZHEIMER'S DEMENTIA WITH AGITATION: ICD-10-CM

## 2025-06-13 ENCOUNTER — OFFICE VISIT (OUTPATIENT)
Dept: BEHAVIORAL HEALTH | Facility: CLINIC | Age: 85
End: 2025-06-13
Payer: MEDICARE

## 2025-06-13 VITALS
DIASTOLIC BLOOD PRESSURE: 70 MMHG | SYSTOLIC BLOOD PRESSURE: 126 MMHG | OXYGEN SATURATION: 95 % | HEIGHT: 69 IN | HEART RATE: 64 BPM | BODY MASS INDEX: 27.85 KG/M2 | WEIGHT: 188 LBS

## 2025-06-13 DIAGNOSIS — F02.A11 MILD LATE ONSET ALZHEIMER'S DEMENTIA WITH AGITATION: Primary | ICD-10-CM

## 2025-06-13 DIAGNOSIS — G30.1 MILD LATE ONSET ALZHEIMER'S DEMENTIA WITH AGITATION: Primary | ICD-10-CM

## 2025-06-13 DIAGNOSIS — F41.1 GAD (GENERALIZED ANXIETY DISORDER): ICD-10-CM

## 2025-06-13 NOTE — PROGRESS NOTES
Follow Up Office Visit      Patient Name: Orestes Garcia Jr.  : 1940   MRN: [id number]     Referring Provider: Jose Alberto Vang MD    Chief Complaint:      ICD-10-CM ICD-9-CM   1. 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 DEEP (generalized anxiety disorder)  F41.1 300.02        Treatment Plan from Last Visit:   Continue brexpiprazole (Rexulti) 0.5 mg daily  Continue citalopram (Celexa) 40 mg daily  Continue buspirone (Buspar) 10 mg TID- patient reports not taking regularly at this time    History of Present Illness:   Orestes Garcia Jr. is a 85 y.o. male who is here today for follow up with anxiety and agitation related to dementia     Subjective      Changes to Physical Health or Medications since Last Visit: Frequent follow ups related to various comorbid conditions including visit to cardiology for bradycardia which was determined not to require additional follow up or change at this time.      Patient Reports:   Spouse present for visit   No change or worsening of symptoms noted  Brexpiprazole remains expensive but beneficial  Patient states anxiety and depression are controlled       Screening Scores:   PHQ-9: PHQ-9 Depression Screening  Little interest or pleasure in doing things? Several days   Feeling down, depressed, or hopeless? Nearly every day   PHQ-2 Total Score 4   Trouble falling or staying asleep, or sleeping too much? More than half the days   Feeling tired or having little energy? More than half the days   Poor appetite or overeating? Not at all   Feeling bad about yourself - or that you are a failure or have let yourself or your family down? More than half the days   Trouble concentrating on things, such as reading the newspaper or watching television? Not at all   Moving or speaking so slowly that other people could have noticed? Or the opposite - being so fidgety or restless that you have been moving around a lot more than usual? Several  days     Thoughts that you would be better off dead, or of hurting yourself in some way? Not at all   PHQ-9 Total Score 11   If you checked off any problems, how difficult have these problems made it for you to do your work, take care of things at home, or get along with other people? Not difficult at all      and DEEP 7: DEEP 7 Total Score: 15 Patient screened positive for depression based on a PHQ-9 score of 11 on 6/13/2025. Follow-up recommendations include: Continue with medication management.     PHQ-9 at last visit: 14  DEEP 7 at last visit: 16    RISK ASSESSMENT:  Patient denies any high risk factors today.    Medications:     Current Outpatient Medications:     amLODIPine (NORVASC) 5 MG tablet, Take 1 tablet by mouth Daily., Disp: , Rfl:     aspirin 81 MG EC tablet, Take 1 tablet by mouth Daily. occasionally, Disp: , Rfl:     atorvastatin (LIPITOR) 20 MG tablet, Take 1 tablet by mouth Every Night., Disp: 90 tablet, Rfl: 3    busPIRone (BUSPAR) 10 MG tablet, Take 1 tablet by mouth 3 (Three) Times a Day. Take every day, to help control anxiety, Disp: 270 tablet, Rfl: 3    Cholecalciferol (Vitamin D3) 25 MCG (1000 UT) capsule, Take 1 capsule by mouth Daily., Disp: , Rfl:     citalopram (CeleXA) 40 MG tablet, TAKE 1 TABLET BY MOUTH EVERY DAY, Disp: 90 tablet, Rfl: 3    clopidogrel (PLAVIX) 75 MG tablet, Take 1 tablet by mouth Daily., Disp: 90 tablet, Rfl: 3    donepezil (ARICEPT) 10 MG tablet, Take 1 tablet by mouth Every Evening., Disp: 90 tablet, Rfl: 3    doxycycline (DORYX) 100 MG enteric coated tablet, Take 1 tablet by mouth Daily., Disp: , Rfl:     glucose blood test strip, Check glucose qd, DX E11.9, Disp: 100 each, Rfl: 3    lisinopril (PRINIVIL,ZESTRIL) 10 MG tablet, Take 1 tablet by mouth Daily., Disp: 90 tablet, Rfl: 3    metFORMIN ER (GLUCOPHAGE-XR) 500 MG 24 hr tablet, Take 2 tablets by mouth Daily., Disp: 180 tablet, Rfl: 3    traZODone (DESYREL) 50 MG tablet, Take 1-2 pills po 30-60 min before  "bedtime to help with nerves and sleep, Disp: 90 tablet, Rfl: 3    Brexpiprazole 0.5 MG tablet, Take 0.5 mg by mouth Daily., Disp: 30 tablet, Rfl: 2    Medication Considerations:  PARK reviewed and appropriate.      Allergies:   No Known Allergies    Objective     Physical Exam:  Vital Signs:   Vitals:    06/13/25 1320   BP: 126/70   Pulse: 64   SpO2: 95%   Weight: 85.3 kg (188 lb)   Height: 175.3 cm (69\")     Body mass index is 27.76 kg/m².     Mental Status Exam:   Hygiene:   good  Cooperation:  Cooperative  Eye Contact:  Good  Psychomotor Behavior:  Appropriate  Affect:  Appropriate  Mood: normal  Speech:  Normal  Thought Process:  Goal directed  Thought Content:  Normal  Suicidal:  None  Homicidal:  None  Hallucinations:  None  Delusion:  None  Memory:  Deficits  Orientation:  Person and Place  Reliability:  fair  Insight:  Fair  Judgement:  Fair  Impulse Control:  Good      Assessment / Plan      Visit Diagnosis/Orders Placed This Visit:  Diagnoses and all orders for this visit:    1. 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 DEEP (generalized anxiety disorder)       Functional Status: Mild impairment     Prognosis: Good with Ongoing Treatment     Impression/Formulation:  Patient appeared alert and oriented. Patient is receptive to assistance with maintaining a stable lifestyle.  Patient presents with history of     ICD-10-CM ICD-9-CM   1. 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 DEEP (generalized anxiety disorder)  F41.1 300.02   Patient and spouse deny new concerns and report continued benefit with current regimen. No changes made today. Refills not needed today. Patient and spouse deny other questions or concerns to address today.     Treatment Plan:     Continue brexpiprazole (Rexulti) 0.5 mg daily  Continue citalopram (Celexa) 40 mg daily  Continue buspirone (Buspar) 10 mg TID    Patient will continue supportive psychotherapy efforts " and medications as indicated. Clinic will obtain release of information for current treatment team for continuity of care as needed. Patient will contact this office, call 911 or present to the nearest emergency room should suicidal or homicidal ideations occur.  Discussed medication options and treatment plan of prescribed medication(s) as well as the risks, benefits, and potential side effects. Patient ackowledged and verbally consented to continue with current treatment plan and was educated on the importance of compliance with treatment and follow-up appointments.     Follow Up:   Return in about 3 months (around 9/13/2025) for Next scheduled follow up.  It was so nice to see you today. Please let me know if you have any questions before our next appointment.        RANI James   [facility]     This is electronically signed by RANI James   06/13/2025  13:49 EDT

## 2025-07-03 ENCOUNTER — TELEPHONE (OUTPATIENT)
Dept: BEHAVIORAL HEALTH | Facility: CLINIC | Age: 85
End: 2025-07-03
Payer: MEDICARE

## 2025-07-03 NOTE — TELEPHONE ENCOUNTER
Office had a voicemail left by Nette Grant requesting a call back about his Rexulti     Nette Grant is not on Pt verbal release, nor did they leave relation to Pt     Pt has Rexulti refills at pharmacy

## 2025-08-12 DIAGNOSIS — F02.A11 MILD LATE ONSET ALZHEIMER'S DEMENTIA WITH AGITATION: ICD-10-CM

## 2025-08-12 DIAGNOSIS — G30.1 MILD LATE ONSET ALZHEIMER'S DEMENTIA WITH AGITATION: ICD-10-CM

## 2025-08-14 ENCOUNTER — OFFICE VISIT (OUTPATIENT)
Dept: CARDIOLOGY | Facility: CLINIC | Age: 85
End: 2025-08-14
Payer: MEDICARE

## 2025-08-14 VITALS
OXYGEN SATURATION: 99 % | BODY MASS INDEX: 27.7 KG/M2 | SYSTOLIC BLOOD PRESSURE: 124 MMHG | RESPIRATION RATE: 18 BRPM | HEIGHT: 69 IN | DIASTOLIC BLOOD PRESSURE: 74 MMHG | WEIGHT: 187 LBS

## 2025-08-14 DIAGNOSIS — E78.5 HYPERLIPIDEMIA LDL GOAL <70: ICD-10-CM

## 2025-08-14 DIAGNOSIS — I10 BENIGN ESSENTIAL HYPERTENSION: Primary | ICD-10-CM

## 2025-08-14 DIAGNOSIS — I65.23 CAROTID ARTERY STENOSIS, ASYMPTOMATIC, BILATERAL: ICD-10-CM

## 2025-08-14 PROBLEM — Z79.899 ENCOUNTER FOR LONG-TERM (CURRENT) USE OF OTHER MEDICATIONS: Status: RESOLVED | Noted: 2022-07-14 | Resolved: 2025-08-14

## 2025-08-14 PROBLEM — Z79.899 ENCOUNTER FOR LONG-TERM (CURRENT) USE OF HIGH-RISK MEDICATION: Status: RESOLVED | Noted: 2018-04-18 | Resolved: 2025-08-14

## 2025-08-14 PROCEDURE — 1159F MED LIST DOCD IN RCRD: CPT | Performed by: INTERNAL MEDICINE

## 2025-08-14 PROCEDURE — 99214 OFFICE O/P EST MOD 30 MIN: CPT | Performed by: INTERNAL MEDICINE

## 2025-08-14 PROCEDURE — 1160F RVW MEDS BY RX/DR IN RCRD: CPT | Performed by: INTERNAL MEDICINE

## 2025-08-14 PROCEDURE — 93000 ELECTROCARDIOGRAM COMPLETE: CPT | Performed by: INTERNAL MEDICINE

## (undated) DEVICE — CATH TEMPO 5F SIM 2 100CM: Brand: TEMPO

## (undated) DEVICE — LEX NEURO ANGIOGRAPHY: Brand: MEDLINE INDUSTRIES, INC.

## (undated) DEVICE — GLIDESHEATH SLENDER STAINLESS STEEL KIT: Brand: GLIDESHEATH SLENDER

## (undated) DEVICE — DEV COMP RAD PRELUDESYNC 24CM

## (undated) DEVICE — LIMB HOLDER, WRIST/ANKLE: Brand: DEROYAL

## (undated) DEVICE — RADIFOCUS GLIDEWIRE: Brand: GLIDEWIRE

## (undated) DEVICE — RADIFOCUS TORQUE DEVICE MULTI-TORQUE VISE: Brand: RADIFOCUS TORQUE DEVICE

## (undated) DEVICE — CVR PROB ULTRASND/TRANSD W/GEL 7X11IN STRL